# Patient Record
Sex: FEMALE | Race: WHITE | NOT HISPANIC OR LATINO | Employment: OTHER | URBAN - METROPOLITAN AREA
[De-identification: names, ages, dates, MRNs, and addresses within clinical notes are randomized per-mention and may not be internally consistent; named-entity substitution may affect disease eponyms.]

---

## 2017-01-17 ENCOUNTER — TELEPHONE (OUTPATIENT)
Dept: SMOKING CESSATION | Facility: CLINIC | Age: 52
End: 2017-01-17

## 2017-01-18 ENCOUNTER — TELEPHONE (OUTPATIENT)
Dept: SMOKING CESSATION | Facility: CLINIC | Age: 52
End: 2017-01-18

## 2017-08-02 ENCOUNTER — TELEPHONE (OUTPATIENT)
Dept: SMOKING CESSATION | Facility: CLINIC | Age: 52
End: 2017-08-02

## 2017-08-08 ENCOUNTER — TELEPHONE (OUTPATIENT)
Dept: SMOKING CESSATION | Facility: CLINIC | Age: 52
End: 2017-08-08

## 2017-08-11 ENCOUNTER — TELEPHONE (OUTPATIENT)
Dept: SMOKING CESSATION | Facility: CLINIC | Age: 52
End: 2017-08-11

## 2022-07-10 ENCOUNTER — HOSPITAL ENCOUNTER (EMERGENCY)
Facility: HOSPITAL | Age: 57
Discharge: HOME | End: 2022-07-10
Attending: EMERGENCY MEDICINE
Payer: COMMERCIAL

## 2022-07-10 VITALS
BODY MASS INDEX: 31.28 KG/M2 | HEIGHT: 62 IN | RESPIRATION RATE: 14 BRPM | SYSTOLIC BLOOD PRESSURE: 151 MMHG | HEART RATE: 78 BPM | OXYGEN SATURATION: 95 % | DIASTOLIC BLOOD PRESSURE: 84 MMHG | WEIGHT: 170 LBS

## 2022-07-10 DIAGNOSIS — R56.9 SEIZURE-LIKE ACTIVITY: ICD-10-CM

## 2022-07-10 DIAGNOSIS — R55 SYNCOPE: ICD-10-CM

## 2022-07-10 DIAGNOSIS — E87.5 HYPERKALEMIA: Primary | ICD-10-CM

## 2022-07-10 LAB
ALBUMIN SERPL BCP-MCNC: 3.9 G/DL (ref 3.5–5.2)
ALLENS TEST: ABNORMAL
ALP SERPL-CCNC: 90 U/L (ref 55–135)
ALT SERPL W/O P-5'-P-CCNC: 19 U/L (ref 10–44)
ANION GAP SERPL CALC-SCNC: 11 MMOL/L (ref 8–16)
ANION GAP SERPL CALC-SCNC: 12 MMOL/L (ref 8–16)
AST SERPL-CCNC: 34 U/L (ref 10–40)
BACTERIA #/AREA URNS HPF: ABNORMAL /HPF
BASOPHILS # BLD AUTO: 0.07 K/UL (ref 0–0.2)
BASOPHILS NFR BLD: 0.5 % (ref 0–1.9)
BILIRUB SERPL-MCNC: 0.4 MG/DL (ref 0.1–1)
BILIRUB UR QL STRIP: NEGATIVE
BNP SERPL-MCNC: <10 PG/ML (ref 0–99)
BUN SERPL-MCNC: 9 MG/DL (ref 6–20)
BUN SERPL-MCNC: 9 MG/DL (ref 6–20)
CALCIUM SERPL-MCNC: 9.1 MG/DL (ref 8.7–10.5)
CALCIUM SERPL-MCNC: 9.4 MG/DL (ref 8.7–10.5)
CHLORIDE SERPL-SCNC: 104 MMOL/L (ref 95–110)
CHLORIDE SERPL-SCNC: 108 MMOL/L (ref 95–110)
CK SERPL-CCNC: 64 U/L (ref 20–180)
CLARITY UR: CLEAR
CO2 SERPL-SCNC: 21 MMOL/L (ref 23–29)
CO2 SERPL-SCNC: 21 MMOL/L (ref 23–29)
COLOR UR: YELLOW
CREAT SERPL-MCNC: 0.8 MG/DL (ref 0.5–1.4)
CREAT SERPL-MCNC: 1 MG/DL (ref 0.5–1.4)
DELSYS: ABNORMAL
DIFFERENTIAL METHOD: ABNORMAL
EOSINOPHIL # BLD AUTO: 0 K/UL (ref 0–0.5)
EOSINOPHIL NFR BLD: 0.2 % (ref 0–8)
ERYTHROCYTE [DISTWIDTH] IN BLOOD BY AUTOMATED COUNT: 13.3 % (ref 11.5–14.5)
EST. GFR  (AFRICAN AMERICAN): >60 ML/MIN/1.73 M^2
EST. GFR  (AFRICAN AMERICAN): >60 ML/MIN/1.73 M^2
EST. GFR  (NON AFRICAN AMERICAN): >60 ML/MIN/1.73 M^2
EST. GFR  (NON AFRICAN AMERICAN): >60 ML/MIN/1.73 M^2
GLUCOSE SERPL-MCNC: 110 MG/DL (ref 70–110)
GLUCOSE SERPL-MCNC: 123 MG/DL (ref 70–110)
GLUCOSE UR QL STRIP: NEGATIVE
HCO3 UR-SCNC: 28.3 MMOL/L (ref 24–28)
HCT VFR BLD AUTO: 47.7 % (ref 37–48.5)
HGB BLD-MCNC: 15.6 G/DL (ref 12–16)
HGB UR QL STRIP: ABNORMAL
IMM GRANULOCYTES # BLD AUTO: 0.06 K/UL (ref 0–0.04)
IMM GRANULOCYTES NFR BLD AUTO: 0.5 % (ref 0–0.5)
KETONES UR QL STRIP: NEGATIVE
LEUKOCYTE ESTERASE UR QL STRIP: NEGATIVE
LYMPHOCYTES # BLD AUTO: 2.1 K/UL (ref 1–4.8)
LYMPHOCYTES NFR BLD: 16.6 % (ref 18–48)
MAGNESIUM SERPL-MCNC: 2.3 MG/DL (ref 1.6–2.6)
MCH RBC QN AUTO: 29.4 PG (ref 27–31)
MCHC RBC AUTO-ENTMCNC: 32.7 G/DL (ref 32–36)
MCV RBC AUTO: 90 FL (ref 82–98)
MICROSCOPIC COMMENT: ABNORMAL
MONOCYTES # BLD AUTO: 0.7 K/UL (ref 0.3–1)
MONOCYTES NFR BLD: 5.8 % (ref 4–15)
NEUTROPHILS # BLD AUTO: 9.7 K/UL (ref 1.8–7.7)
NEUTROPHILS NFR BLD: 76.4 % (ref 38–73)
NITRITE UR QL STRIP: NEGATIVE
NRBC BLD-RTO: 0 /100 WBC
PCO2 BLDA: 50.1 MMHG (ref 35–45)
PH SMN: 7.36 [PH] (ref 7.35–7.45)
PH UR STRIP: 6 [PH] (ref 5–8)
PLATELET # BLD AUTO: 264 K/UL (ref 150–450)
PMV BLD AUTO: 10.5 FL (ref 9.2–12.9)
PO2 BLDA: 61 MMHG (ref 40–60)
POC BE: 3 MMOL/L
POC SATURATED O2: 90 % (ref 95–100)
POTASSIUM SERPL-SCNC: 4.7 MMOL/L (ref 3.5–5.1)
POTASSIUM SERPL-SCNC: 5.9 MMOL/L (ref 3.5–5.1)
PROT SERPL-MCNC: 8.4 G/DL (ref 6–8.4)
PROT UR QL STRIP: ABNORMAL
RBC # BLD AUTO: 5.3 M/UL (ref 4–5.4)
RBC #/AREA URNS HPF: 5 /HPF (ref 0–4)
SAMPLE: ABNORMAL
SITE: ABNORMAL
SODIUM SERPL-SCNC: 137 MMOL/L (ref 136–145)
SODIUM SERPL-SCNC: 140 MMOL/L (ref 136–145)
SP GR UR STRIP: 1.02 (ref 1–1.03)
SQUAMOUS #/AREA URNS HPF: 2 /HPF
TROPONIN I SERPL DL<=0.01 NG/ML-MCNC: <0.006 NG/ML (ref 0–0.03)
URN SPEC COLLECT METH UR: ABNORMAL
UROBILINOGEN UR STRIP-ACNC: ABNORMAL EU/DL
WBC # BLD AUTO: 12.73 K/UL (ref 3.9–12.7)

## 2022-07-10 PROCEDURE — 81000 URINALYSIS NONAUTO W/SCOPE: CPT | Performed by: EMERGENCY MEDICINE

## 2022-07-10 PROCEDURE — 84484 ASSAY OF TROPONIN QUANT: CPT | Performed by: EMERGENCY MEDICINE

## 2022-07-10 PROCEDURE — 85025 COMPLETE CBC W/AUTO DIFF WBC: CPT | Performed by: EMERGENCY MEDICINE

## 2022-07-10 PROCEDURE — 93005 ELECTROCARDIOGRAM TRACING: CPT

## 2022-07-10 PROCEDURE — 93010 EKG 12-LEAD: ICD-10-PCS | Mod: ,,, | Performed by: INTERNAL MEDICINE

## 2022-07-10 PROCEDURE — 82550 ASSAY OF CK (CPK): CPT | Performed by: EMERGENCY MEDICINE

## 2022-07-10 PROCEDURE — 83735 ASSAY OF MAGNESIUM: CPT | Performed by: EMERGENCY MEDICINE

## 2022-07-10 PROCEDURE — 63600175 PHARM REV CODE 636 W HCPCS: Performed by: EMERGENCY MEDICINE

## 2022-07-10 PROCEDURE — 93010 ELECTROCARDIOGRAM REPORT: CPT | Mod: ,,, | Performed by: INTERNAL MEDICINE

## 2022-07-10 PROCEDURE — 83880 ASSAY OF NATRIURETIC PEPTIDE: CPT | Performed by: EMERGENCY MEDICINE

## 2022-07-10 PROCEDURE — 80053 COMPREHEN METABOLIC PANEL: CPT | Performed by: EMERGENCY MEDICINE

## 2022-07-10 PROCEDURE — 25000003 PHARM REV CODE 250: Performed by: EMERGENCY MEDICINE

## 2022-07-10 PROCEDURE — 80048 BASIC METABOLIC PNL TOTAL CA: CPT | Mod: XB | Performed by: EMERGENCY MEDICINE

## 2022-07-10 PROCEDURE — 99285 EMERGENCY DEPT VISIT HI MDM: CPT | Mod: 25

## 2022-07-10 PROCEDURE — 96365 THER/PROPH/DIAG IV INF INIT: CPT

## 2022-07-10 RX ORDER — SODIUM CHLORIDE 9 MG/ML
1000 INJECTION, SOLUTION INTRAVENOUS
Status: COMPLETED | OUTPATIENT
Start: 2022-07-10 | End: 2022-07-10

## 2022-07-10 RX ORDER — CALCIUM GLUCONATE 20 MG/ML
1 INJECTION, SOLUTION INTRAVENOUS
Status: COMPLETED | OUTPATIENT
Start: 2022-07-10 | End: 2022-07-10

## 2022-07-10 RX ADMIN — CALCIUM GLUCONATE 1 G: 20 INJECTION, SOLUTION INTRAVENOUS at 07:07

## 2022-07-10 RX ADMIN — SODIUM CHLORIDE 1000 ML: 0.9 INJECTION, SOLUTION INTRAVENOUS at 08:07

## 2022-07-10 RX ADMIN — SODIUM CHLORIDE, SODIUM LACTATE, POTASSIUM CHLORIDE, AND CALCIUM CHLORIDE 1000 ML: .6; .31; .03; .02 INJECTION, SOLUTION INTRAVENOUS at 07:07

## 2022-07-10 NOTE — ED PROVIDER NOTES
SCRIBE #1 NOTE: I, Carlos Shields, am scribing for, and in the presence of, Jacinto Martin MD. I have scribed the entire note.       History     Chief Complaint   Patient presents with    Seizures     Pt brought in ambulance for weakness and seizures; pt did not hit head     Review of patient's allergies indicates:   Allergen Reactions    Penicillins Shortness Of Breath and Nausea And Vomiting         History of Present Illness     HPI    7/10/2022, 6:54 PM  History obtained from the patient      History of Present Illness: Vesna Richards is a 56 y.o. female patient  who presents to the Emergency Department for evaluation of loss of conciousness which occurred just PTA. Pt was overheated and passed out. Pt reportedly did not hit her head. As per pt's family, pt was shaking while she was unconscious. Patient's  says this lasted for several minutes before she regained consciousness. Pt reportedly urinated on herself during her syncopic episode. After, she says she was mildly confused for several minutes but is now feeling normal. Symptoms are constant and moderate in severity. No mitigating or exacerbating factors reported. Associated sxs include fatigue. Patient denies any fever, chills, SOB, cough, congestion, weakness, numbness, N/V/D, and all other sxs at this time. No prior Tx reported. No further complaints or concerns at this time.       Arrival mode: EMS      PCP: Primary Doctor No        Past Medical History:  No past medical history on file.    Past Surgical History:  Past Surgical History:   Procedure Laterality Date     SECTION           Family History:  No family history on file.    Social History:  Social History     Tobacco Use    Smoking status: Current Every Day Smoker     Packs/day: 3.00     Years: 37.00     Pack years: 111.00     Types: Cigarettes    Smokeless tobacco: Not on file   Substance and Sexual Activity    Alcohol use: Not on file    Drug use: Not on file    Sexual  activity: Not on file        Review of Systems     Review of Systems   Constitutional: Positive for fatigue. Negative for chills and fever.   HENT: Negative for congestion and sore throat.    Respiratory: Negative for cough and shortness of breath.    Cardiovascular: Negative for chest pain.   Gastrointestinal: Negative for diarrhea, nausea and vomiting.   Genitourinary: Negative for dysuria.   Musculoskeletal: Negative for back pain.   Skin: Negative for rash.   Neurological: Positive for seizures. Negative for weakness and numbness.   Hematological: Does not bruise/bleed easily.   All other systems reviewed and are negative.     Physical Exam     Initial Vitals [07/10/22 1744]   BP Pulse Resp Temp SpO2   (!) 144/84 95 14 -- 96 %      MAP       --          Physical Exam  Nursing Notes and Vital Signs Reviewed.  Constitutional: Patient is in no acute distress. Well-developed and well-nourished.  Head: Atraumatic. Normocephalic.  Eyes: PERRL. EOM intact. Conjunctivae are not pale. No scleral icterus.  ENT: Mucous membranes are moist. Oropharynx is clear and symmetric.    Neck: Supple. Full ROM. No lymphadenopathy.  Cardiovascular: Regular rate. Regular rhythm. No murmurs, rubs, or gallops. Distal pulses are 2+ and symmetric.  Pulmonary/Chest: No respiratory distress. Clear to auscultation bilaterally. No wheezing or rales.  Abdominal: Soft and non-distended.  There is no tenderness.  No rebound, guarding, or rigidity.   Musculoskeletal: Moves all extremities. No obvious deformities. No edema. No calf tenderness.  Skin: Warm and dry.  Neurological:  Alert, awake, and appropriate.  Normal speech.  No acute focal neurological deficits are appreciated.  Psychiatric: Normal affect. Good eye contact. Appropriate in content.     ED Course   Procedures  ED Vital Signs:  Vitals:    07/10/22 1744 07/10/22 2030 07/10/22 2204   BP: (!) 144/84 (!) 151/84    Pulse: 95 78    Resp: 14 14    SpO2: 96% 95%    Weight:   77.1 kg (169 lb  "15.6 oz)   Height:   5' 2" (1.575 m)       Abnormal Lab Results:  Labs Reviewed   CBC W/ AUTO DIFFERENTIAL - Abnormal; Notable for the following components:       Result Value    WBC 12.73 (*)     Gran # (ANC) 9.7 (*)     Immature Grans (Abs) 0.06 (*)     Gran % 76.4 (*)     Lymph % 16.6 (*)     All other components within normal limits   COMPREHENSIVE METABOLIC PANEL - Abnormal; Notable for the following components:    Potassium 5.9 (*)     CO2 21 (*)     Glucose 123 (*)     All other components within normal limits   URINALYSIS, REFLEX TO URINE CULTURE - Abnormal; Notable for the following components:    Protein, UA Trace (*)     Occult Blood UA 1+ (*)     Urobilinogen, UA 2.0-3.0 (*)     All other components within normal limits    Narrative:     Specimen Source->Urine   URINALYSIS MICROSCOPIC - Abnormal; Notable for the following components:    RBC, UA 5 (*)     All other components within normal limits    Narrative:     Specimen Source->Urine   BASIC METABOLIC PANEL - Abnormal; Notable for the following components:    CO2 21 (*)     All other components within normal limits   ISTAT PROCEDURE - Abnormal; Notable for the following components:    POC PCO2 50.1 (*)     POC PO2 61 (*)     POC HCO3 28.3 (*)     POC SATURATED O2 90 (*)     All other components within normal limits   MAGNESIUM   CK   B-TYPE NATRIURETIC PEPTIDE   TROPONIN I        All Lab Results:  Results for orders placed or performed during the hospital encounter of 07/10/22   CBC auto differential   Result Value Ref Range    WBC 12.73 (H) 3.90 - 12.70 K/uL    RBC 5.30 4.00 - 5.40 M/uL    Hemoglobin 15.6 12.0 - 16.0 g/dL    Hematocrit 47.7 37.0 - 48.5 %    MCV 90 82 - 98 fL    MCH 29.4 27.0 - 31.0 pg    MCHC 32.7 32.0 - 36.0 g/dL    RDW 13.3 11.5 - 14.5 %    Platelets 264 150 - 450 K/uL    MPV 10.5 9.2 - 12.9 fL    Immature Granulocytes 0.5 0.0 - 0.5 %    Gran # (ANC) 9.7 (H) 1.8 - 7.7 K/uL    Immature Grans (Abs) 0.06 (H) 0.00 - 0.04 K/uL    Lymph # " 2.1 1.0 - 4.8 K/uL    Mono # 0.7 0.3 - 1.0 K/uL    Eos # 0.0 0.0 - 0.5 K/uL    Baso # 0.07 0.00 - 0.20 K/uL    nRBC 0 0 /100 WBC    Gran % 76.4 (H) 38.0 - 73.0 %    Lymph % 16.6 (L) 18.0 - 48.0 %    Mono % 5.8 4.0 - 15.0 %    Eosinophil % 0.2 0.0 - 8.0 %    Basophil % 0.5 0.0 - 1.9 %    Differential Method Automated    Comprehensive metabolic panel   Result Value Ref Range    Sodium 137 136 - 145 mmol/L    Potassium 5.9 (H) 3.5 - 5.1 mmol/L    Chloride 104 95 - 110 mmol/L    CO2 21 (L) 23 - 29 mmol/L    Glucose 123 (H) 70 - 110 mg/dL    BUN 9 6 - 20 mg/dL    Creatinine 1.0 0.5 - 1.4 mg/dL    Calcium 9.4 8.7 - 10.5 mg/dL    Total Protein 8.4 6.0 - 8.4 g/dL    Albumin 3.9 3.5 - 5.2 g/dL    Total Bilirubin 0.4 0.1 - 1.0 mg/dL    Alkaline Phosphatase 90 55 - 135 U/L    AST 34 10 - 40 U/L    ALT 19 10 - 44 U/L    Anion Gap 12 8 - 16 mmol/L    eGFR if African American >60 >60 mL/min/1.73 m^2    eGFR if non African American >60 >60 mL/min/1.73 m^2   Magnesium   Result Value Ref Range    Magnesium 2.3 1.6 - 2.6 mg/dL   CK   Result Value Ref Range    CPK 64 20 - 180 U/L   Brain natriuretic peptide   Result Value Ref Range    BNP <10 0 - 99 pg/mL   Troponin I   Result Value Ref Range    Troponin I <0.006 0.000 - 0.026 ng/mL   Urinalysis, Reflex to Urine Culture Urine, Clean Catch    Specimen: Urine   Result Value Ref Range    Specimen UA Urine, Clean Catch     Color, UA Yellow Yellow, Straw, Lo    Appearance, UA Clear Clear    pH, UA 6.0 5.0 - 8.0    Specific Gravity, UA 1.020 1.005 - 1.030    Protein, UA Trace (A) Negative    Glucose, UA Negative Negative    Ketones, UA Negative Negative    Bilirubin (UA) Negative Negative    Occult Blood UA 1+ (A) Negative    Nitrite, UA Negative Negative    Urobilinogen, UA 2.0-3.0 (A) <2.0 EU/dL    Leukocytes, UA Negative Negative   Urinalysis Microscopic   Result Value Ref Range    RBC, UA 5 (H) 0 - 4 /hpf    Bacteria Occasional None-Occ /hpf    Squam Epithel, UA 2 /hpf    Microscopic  Comment SEE COMMENT    Basic metabolic panel   Result Value Ref Range    Sodium 140 136 - 145 mmol/L    Potassium 4.7 3.5 - 5.1 mmol/L    Chloride 108 95 - 110 mmol/L    CO2 21 (L) 23 - 29 mmol/L    Glucose 110 70 - 110 mg/dL    BUN 9 6 - 20 mg/dL    Creatinine 0.8 0.5 - 1.4 mg/dL    Calcium 9.1 8.7 - 10.5 mg/dL    Anion Gap 11 8 - 16 mmol/L    eGFR if African American >60 >60 mL/min/1.73 m^2    eGFR if non African American >60 >60 mL/min/1.73 m^2   ISTAT PROCEDURE   Result Value Ref Range    POC PH 7.360 7.35 - 7.45    POC PCO2 50.1 (H) 35 - 45 mmHg    POC PO2 61 (HH) 40 - 60 mmHg    POC HCO3 28.3 (H) 24 - 28 mmol/L    POC BE 3 -2 to 2 mmol/L    POC SATURATED O2 90 (L) 95 - 100 %    Sample VENOUS     Site Other     Allens Test N/A     DelSys Room Air        Imaging Results:  Imaging Results          CT Head Without Contrast (Final result)  Result time 07/10/22 19:17:31    Final result by Valarie Darnell MD (07/10/22 19:17:31)                 Impression:      No acute abnormality.    All CT scans   are performed using dose optimization techniques including the following: automated exposure control; adjustment of the mA and/or kV; use of iterative reconstruction technique.  Dose modulation was employed for ALARA by means of: Automated exposure control; adjustment of the mA and/or kV according to patient size (this includes techniques or standardized protocols for targeted exams where dose is matched to indication/reason for exam; i.e. extremities or head); and/or use of iterative reconstructive technique.      Electronically signed by: Mann Sheppard  Date:    07/10/2022  Time:    19:17             Narrative:    EXAMINATION:  CT HEAD WITHOUT CONTRAST    CLINICAL HISTORY:  Head trauma, abnormal mental status (Age 19-64y);    TECHNIQUE:  Low dose axial CT images obtained throughout the head without intravenous contrast. Sagittal and coronal reconstructions were performed.    COMPARISON:  None.    FINDINGS:  Intracranial  compartment:    Ventricles and sulci are normal in size for age without evidence of hydrocephalus. No extra-axial blood or fluid collections.    No parenchymal mass, hemorrhage, edema or major vascular distribution infarct.    Skull/extracranial contents (limited evaluation): No fracture. Mastoid air cells and paranasal sinuses are essentially clear.                               X-Ray Chest AP Portable (Final result)  Result time 07/10/22 19:07:08    Final result by Valarie Darnell MD (07/10/22 19:07:08)                 Impression:      Mild perihilar interstitial prominence.  Correlate clinically for pulmonary edema and early CHF.      Electronically signed by: Mann Sheppard  Date:    07/10/2022  Time:    19:07             Narrative:    EXAMINATION:  XR CHEST AP PORTABLE    CLINICAL HISTORY:  SOB;    TECHNIQUE:  Single frontal view of the chest was performed.    COMPARISON:  None    FINDINGS:  Mild perihilar interstitial opacities.The lungs are otherwise clear, with normal appearance of pulmonary vasculature and no pleural effusion or pneumothorax.    The cardiac silhouette is prominent.  The hilar and mediastinal contours are unremarkable.    Bones are intact.                                 The EKG was ordered, reviewed, and independently interpreted by the ED provider.  Interpretation time: 19:25  Rate: 79 BPM  Rhythm: normal sinus rhythm  Interpretation: Septal infarct, age undetermined. ST & T wave abnormality, consider lateral ischemia. No STEMI.           The Emergency Provider reviewed the vital signs and test results, which are outlined above.     ED Discussion     9:09 PM: Discussed pt's case with Dr. Crowder (Neurology) who recommends making sure pt is back to her baseline and able to ambulate well before discharge. He also recommends MRI brain w/wo, seizure protocol, and EEG within next 7 days with Neurology appt within next 7 days.    10:36 PM: Reassessed pt at this time. Discussed with pt all  pertinent ED information and results. Discussed pt dx and plan of tx, including seizure precautions. Gave pt all f/u and return to the ED instructions. All questions and concerns were addressed at this time. Pt expresses understanding of information and instructions, and is comfortable with plan to discharge. Pt is stable for discharge.    I discussed with patient and/or family/caretaker that evaluation in the ED does not suggest any emergent or life threatening medical conditions requiring immediate intervention beyond what was provided in the ED, and I believe patient is safe for discharge.  Regardless, an unremarkable evaluation in the ED does not preclude the development or presence of a serious of life threatening condition. As such, patient was instructed to return immediately for any worsening or change in current symptoms.     Medical Decision Making:   Clinical Tests:   Lab Tests: Ordered and Reviewed  Radiological Study: Ordered and Reviewed  Medical Tests: Ordered and Reviewed           ED Medication(s):  Medications   lactated ringers bolus 1,000 mL (0 mLs Intravenous Stopped 7/10/22 2031)   calcium gluconate 1 g in NS IVPB (premixed) (0 g Intravenous Stopped 7/10/22 2031)   0.9%  NaCl infusion (0 mLs Intravenous Stopped 7/10/22 2104)       There are no discharge medications for this patient.       Follow-up Information     Schedule an appointment as soon as possible for a visit  with [facility].    Specialty: Neurology  Why: You need a MRI and EEG within 1 week.  Contact information:  [address] [facility] [address] [phone]  Additional information:  Please park on the [address] side and use the Clinic entrance. Check in on the 1st floor, to the right across from the café.                           Scribe Attestation:   Scribe #1: I performed the above scribed service and the documentation accurately describes the services I performed. I attest to the  accuracy of the note.     Attending:   Physician Attestation Statement for Scribe #1: I, Jacinto Martin MD, personally performed the services described in this documentation, as scribed by Carlos Shields, in my presence, and it is both accurate and complete.           Clinical Impression       ICD-10-CM ICD-9-CM   1. Hyperkalemia  E87.5 276.7   2. Syncope  R55 780.2   3. Seizure-like activity  R56.9 780.39       Disposition:   Disposition: Discharged  Condition: Stable       Jacinto Martin MD  07/11/22 0242

## 2022-07-10 NOTE — Clinical Note
"Vesna"Keven Richards was seen and treated in our emergency department on 7/10/2022.  She may return with limitations on 07/11/2022.  Patient cannot drive or operate heavy machinery until cleared by neurology.      Sincerely,      Jacinto Martin MD    "

## 2022-07-14 ENCOUNTER — TELEPHONE (OUTPATIENT)
Dept: NEUROLOGY | Facility: CLINIC | Age: 57
End: 2022-07-14
Payer: COMMERCIAL

## 2022-07-14 NOTE — TELEPHONE ENCOUNTER
I spoke with patient in regards to scheduling appointment and I advise her that the next available with Dr. Kelly is out in January for the new year. Stated he is the only neurologist in this region and we could add her to the scheduled and then place her on the waiting list. In case we get any cancellations. She did verbalized understating and stated she would reach out to her PCP and would get back to us. Also advise that we could try the [facility] to see if they have a sooner opening.

## 2022-07-14 NOTE — TELEPHONE ENCOUNTER
----- Message from Eve Cleary sent at 7/14/2022 10:28 AM CDT -----  Type:  Sooner Apoointment Request    Caller is requesting a sooner appointment.  Caller declined first available appointment listed below.  Caller will not accept being placed on the waitlist and is requesting a message be sent to doctor.  Name of Caller:patient  When is the first available appointment?na  Symptoms:Np, ER follow up within 1wk, with a MRI/EEG  Would the patient rather a call back or a response via [facility]?call back  Best Call Back [phone]  Additional Information: na

## 2023-04-24 ENCOUNTER — OFFICE VISIT (OUTPATIENT)
Dept: FAMILY MEDICINE | Facility: CLINIC | Age: 58
End: 2023-04-24
Payer: COMMERCIAL

## 2023-04-24 VITALS
DIASTOLIC BLOOD PRESSURE: 68 MMHG | WEIGHT: 176 LBS | HEART RATE: 100 BPM | OXYGEN SATURATION: 96 % | BODY MASS INDEX: 32.39 KG/M2 | HEIGHT: 62 IN | SYSTOLIC BLOOD PRESSURE: 114 MMHG

## 2023-04-24 DIAGNOSIS — Z00.00 ANNUAL PHYSICAL EXAM: ICD-10-CM

## 2023-04-24 DIAGNOSIS — R73.09 ELEVATED GLUCOSE: ICD-10-CM

## 2023-04-24 DIAGNOSIS — Z11.59 NEED FOR HEPATITIS C SCREENING TEST: Primary | ICD-10-CM

## 2023-04-24 DIAGNOSIS — Z12.11 COLON CANCER SCREENING: ICD-10-CM

## 2023-04-24 DIAGNOSIS — E78.2 MIXED HYPERLIPIDEMIA: ICD-10-CM

## 2023-04-24 DIAGNOSIS — Z72.0 TOBACCO USE: ICD-10-CM

## 2023-04-24 DIAGNOSIS — F41.9 ANXIETY: ICD-10-CM

## 2023-04-24 DIAGNOSIS — Z11.4 SCREENING FOR HIV (HUMAN IMMUNODEFICIENCY VIRUS): ICD-10-CM

## 2023-04-24 DIAGNOSIS — Z12.31 ENCOUNTER FOR SCREENING MAMMOGRAM FOR MALIGNANT NEOPLASM OF BREAST: ICD-10-CM

## 2023-04-24 PROBLEM — E78.5 HYPERLIPIDEMIA: Status: ACTIVE | Noted: 2023-04-24

## 2023-04-24 PROBLEM — J30.9 ALLERGIC RHINITIS: Status: ACTIVE | Noted: 2023-04-24

## 2023-04-24 PROBLEM — R55 SYNCOPE: Status: ACTIVE | Noted: 2017-10-17

## 2023-04-24 PROCEDURE — 3078F PR MOST RECENT DIASTOLIC BLOOD PRESSURE < 80 MM HG: ICD-10-PCS | Mod: CPTII,S$GLB,, | Performed by: STUDENT IN AN ORGANIZED HEALTH CARE EDUCATION/TRAINING PROGRAM

## 2023-04-24 PROCEDURE — 99999 PR PBB SHADOW E&M-EST. PATIENT-LVL V: CPT | Mod: PBBFAC,,, | Performed by: STUDENT IN AN ORGANIZED HEALTH CARE EDUCATION/TRAINING PROGRAM

## 2023-04-24 PROCEDURE — 99406 PR TOBACCO USE CESSATION INTERMEDIATE 3-10 MINUTES: ICD-10-PCS | Mod: S$GLB,,, | Performed by: STUDENT IN AN ORGANIZED HEALTH CARE EDUCATION/TRAINING PROGRAM

## 2023-04-24 PROCEDURE — 3078F DIAST BP <80 MM HG: CPT | Mod: CPTII,S$GLB,, | Performed by: STUDENT IN AN ORGANIZED HEALTH CARE EDUCATION/TRAINING PROGRAM

## 2023-04-24 PROCEDURE — 3074F PR MOST RECENT SYSTOLIC BLOOD PRESSURE < 130 MM HG: ICD-10-PCS | Mod: CPTII,S$GLB,, | Performed by: STUDENT IN AN ORGANIZED HEALTH CARE EDUCATION/TRAINING PROGRAM

## 2023-04-24 PROCEDURE — 99386 PR PREVENTIVE VISIT,NEW,40-64: ICD-10-PCS | Mod: 25,S$GLB,, | Performed by: STUDENT IN AN ORGANIZED HEALTH CARE EDUCATION/TRAINING PROGRAM

## 2023-04-24 PROCEDURE — 99999 PR PBB SHADOW E&M-EST. PATIENT-LVL V: ICD-10-PCS | Mod: PBBFAC,,, | Performed by: STUDENT IN AN ORGANIZED HEALTH CARE EDUCATION/TRAINING PROGRAM

## 2023-04-24 PROCEDURE — 3008F PR BODY MASS INDEX (BMI) DOCUMENTED: ICD-10-PCS | Mod: CPTII,S$GLB,, | Performed by: STUDENT IN AN ORGANIZED HEALTH CARE EDUCATION/TRAINING PROGRAM

## 2023-04-24 PROCEDURE — 1159F PR MEDICATION LIST DOCUMENTED IN MEDICAL RECORD: ICD-10-PCS | Mod: CPTII,S$GLB,, | Performed by: STUDENT IN AN ORGANIZED HEALTH CARE EDUCATION/TRAINING PROGRAM

## 2023-04-24 PROCEDURE — 1160F RVW MEDS BY RX/DR IN RCRD: CPT | Mod: CPTII,S$GLB,, | Performed by: STUDENT IN AN ORGANIZED HEALTH CARE EDUCATION/TRAINING PROGRAM

## 2023-04-24 PROCEDURE — 99386 PREV VISIT NEW AGE 40-64: CPT | Mod: 25,S$GLB,, | Performed by: STUDENT IN AN ORGANIZED HEALTH CARE EDUCATION/TRAINING PROGRAM

## 2023-04-24 PROCEDURE — 1159F MED LIST DOCD IN RCRD: CPT | Mod: CPTII,S$GLB,, | Performed by: STUDENT IN AN ORGANIZED HEALTH CARE EDUCATION/TRAINING PROGRAM

## 2023-04-24 PROCEDURE — 1160F PR REVIEW ALL MEDS BY PRESCRIBER/CLIN PHARMACIST DOCUMENTED: ICD-10-PCS | Mod: CPTII,S$GLB,, | Performed by: STUDENT IN AN ORGANIZED HEALTH CARE EDUCATION/TRAINING PROGRAM

## 2023-04-24 PROCEDURE — 99406 BEHAV CHNG SMOKING 3-10 MIN: CPT | Mod: S$GLB,,, | Performed by: STUDENT IN AN ORGANIZED HEALTH CARE EDUCATION/TRAINING PROGRAM

## 2023-04-24 PROCEDURE — 3008F BODY MASS INDEX DOCD: CPT | Mod: CPTII,S$GLB,, | Performed by: STUDENT IN AN ORGANIZED HEALTH CARE EDUCATION/TRAINING PROGRAM

## 2023-04-24 PROCEDURE — 3074F SYST BP LT 130 MM HG: CPT | Mod: CPTII,S$GLB,, | Performed by: STUDENT IN AN ORGANIZED HEALTH CARE EDUCATION/TRAINING PROGRAM

## 2023-04-24 RX ORDER — ATORVASTATIN CALCIUM 40 MG/1
40 TABLET, FILM COATED ORAL
COMMUNITY
Start: 2023-02-20 | End: 2023-04-24 | Stop reason: SDUPTHER

## 2023-04-24 RX ORDER — METHOCARBAMOL 750 MG/1
750 TABLET, FILM COATED ORAL
COMMUNITY
Start: 2023-01-27 | End: 2023-04-24

## 2023-04-24 RX ORDER — HYDROXYZINE PAMOATE 25 MG/1
25 CAPSULE ORAL 2 TIMES DAILY
Qty: 180 CAPSULE | Refills: 3 | Status: SHIPPED | OUTPATIENT
Start: 2023-04-24 | End: 2023-10-05 | Stop reason: SDUPTHER

## 2023-04-24 RX ORDER — MELOXICAM 15 MG/1
15 TABLET ORAL
COMMUNITY
Start: 2023-02-23 | End: 2023-04-24

## 2023-04-24 RX ORDER — PAROXETINE 30 MG/1
30 TABLET, FILM COATED ORAL 2 TIMES DAILY
Qty: 180 TABLET | Refills: 3 | Status: ON HOLD | OUTPATIENT
Start: 2023-04-24 | End: 2023-06-04 | Stop reason: SDUPTHER

## 2023-04-24 RX ORDER — HYDROXYZINE PAMOATE 25 MG/1
25 CAPSULE ORAL
COMMUNITY
Start: 2023-03-10 | End: 2023-04-24 | Stop reason: SDUPTHER

## 2023-04-24 RX ORDER — BUSPIRONE HYDROCHLORIDE 5 MG/1
5 TABLET ORAL 2 TIMES DAILY
Qty: 180 TABLET | Refills: 3 | Status: SHIPPED | OUTPATIENT
Start: 2023-04-24 | End: 2023-07-19 | Stop reason: ALTCHOICE

## 2023-04-24 RX ORDER — HYDROCODONE BITARTRATE AND ACETAMINOPHEN 5; 325 MG/1; MG/1
1 TABLET ORAL EVERY 4 HOURS PRN
COMMUNITY
Start: 2023-02-23 | End: 2023-04-24

## 2023-04-24 RX ORDER — VARENICLINE TARTRATE 1 MG/1
TABLET, FILM COATED ORAL
COMMUNITY
End: 2023-04-24

## 2023-04-24 RX ORDER — ALBUTEROL SULFATE 90 UG/1
AEROSOL, METERED RESPIRATORY (INHALATION)
COMMUNITY
End: 2023-04-24

## 2023-04-24 RX ORDER — DOXYLAMINE SUCCINATE AND PHENYLEPHRINE HYDROCHLORIDE 7.5; 1 MG/1; MG/1
TABLET ORAL
COMMUNITY
End: 2023-04-24

## 2023-04-24 RX ORDER — BUSPIRONE HYDROCHLORIDE 5 MG/1
5 TABLET ORAL 2 TIMES DAILY
COMMUNITY
Start: 2023-04-17 | End: 2023-04-24 | Stop reason: SDUPTHER

## 2023-04-24 RX ORDER — CHLORZOXAZONE 500 MG/1
500 TABLET ORAL
COMMUNITY
Start: 2023-02-23 | End: 2023-04-24

## 2023-04-24 RX ORDER — ATORVASTATIN CALCIUM 40 MG/1
40 TABLET, FILM COATED ORAL DAILY
Qty: 90 TABLET | Refills: 3 | Status: SHIPPED | OUTPATIENT
Start: 2023-04-24

## 2023-04-24 RX ORDER — PAROXETINE 10 MG/1
TABLET, FILM COATED ORAL
COMMUNITY
End: 2023-04-24 | Stop reason: SDUPTHER

## 2023-04-24 RX ORDER — GABAPENTIN 300 MG/1
300 CAPSULE ORAL NIGHTLY
COMMUNITY
Start: 2023-02-23 | End: 2023-04-24

## 2023-04-24 NOTE — PATIENT INSTRUCTIONS
[email or web address]  Typically excepts insurance       [facility]  Sees all ages   [address] [place]  [address]  Phone:  [phone]  Fax:  [phone]      [facility]  Dr. Katelyn Irby  [address]  [place], [place] [phone]      [facility]  Lien Cruz MA, LPC  [place], [place]  [phone]  [email or web address]       [facility]  [place]: [phone]  [place]: [phone]  [place]: [phone]  [place]: [phone]      [facility]  Shanelle Kwong MA, LPC,[facility]  Individual, couples, and family counseling  [address] [place], [place] [address] [place], [place]   [phone]    [facility]  [address] [place] [address]  [phone]  Hebert Vicente LCSW [facility]  [address]  [place] [address]  [phone]  Trish Garcia, Ph.D., M.P  Abhijeet Hinds, Ph.D., M.P      [facility]  [address] [place] [address]  Kay Vanegas [facility]  [phone]  Beata Junior LPC, [place]  [phone]        Jade Salinas, [facility], LMFT  [address] [email or web address]  [address]  [place] [address]  [phone]        Morelia Green MA, LPC  Phone: [phone]  Fax: [phone]  6yrs- Adult  Areas of Service: Depression, anxiety, medication management, substance abuse, anger management, coping with grief, communication skills, parenting skills, and addiction.  Accepts private insurance and cash payments        Dr. Delfino Potts- Mandaeism Meeta  [facility] [place] [facility]  [address]   [address]  [place], [place] [address]   [phone]  ACCEPTED INSURANCE:  [facility]  Aetna  [facility]  [facility]  Ceridian  Cigna  Com Psych  Humana  Christelle  [facility]  Value Options        Gianluca Coleman, GARY  Gnosticism counselor  [facility]  [place]  [address] [place], [place] [address]   [phone]  AVG Cost per Session: $80 - $130  ACCEPTED INSURANCE:  micecloud  Value Options  (License to [facility])        [facility]  [address] [address]  Accept Medicaid  Other Locations  [facility]H.C  [facility]  [facility]C  [facility] C.H.C  [facility] CH.C  [facility] C.H.C  [facility] C.C  [facility]  [facility].C    Our skilled providers specialize in treating:  PTSD, Anxiety and Depression, Bipolar Disorder, ADHD, Obesity, Marital Distress, Chronic Tension, Panic Attacks, as well as Phobias      [facility]  [address] [place] [address]  Hours: Monday-Friday, 8 a.m.-5 p.m  Phone: [phone]  [facility]  [address] [place] [address]  Tel. [phone]  Fax [phone]  [facility]  [address] [place] [address]  Tel. [phone]  Fax [phone]  [facility]  [address] [place] [address]  Tel. [phone]  Fax [phone]  [facility]  [address] [place] [address]  Tel. [phone]  Fax [phone]  Walk -In till 4pm all insurances accepted      [facility]  Our skilled providers specialize in treating:  Attention Deficit Hyperactivity Disorder  Attention Deficit Disorder  Obsessive Compulsive Disorder   Autism  Bipolar Disorder  Depression   Anxiety  A variety of other psychological disorders  [place]  Phone: [phone]  [place]  [address] [place], [place], [address] [phone]  [facility]  [facility] [place], [address]  [phone]  [address] [place], [address]  [phone]  [place]  [address] [place] [address]  [phone]  ACCEPTED INSURANCE:  AETNA  [facility]  CIGNA  [facility]  HUMANA  [place] MED[place]  [place] MEDICAID    MULTIPLAN PHCS UNITED HEALTHCARE [facility]  MD Mega Zavala NP Elisa Himel, NP  The providers of [facility] help patients age 16 and over with the treatment of a variety of mental disorders, including:  Stress  Depression  Anxiety  Schizophrenia  Dementia  Bipolar  Developmental disabilities  Other psychiatric mental health issues  [facility] [place]   [address]   [place] [address]   Hours: Monday-Friday, 8 a.m.-5 p.m.   Phone: [phone]   Fax: [phone]  ACCEPTED INSURANCE  Blue Cross (PPO, OGB-PPO)*  Humana  Medicare  *PPO: Preferred Provider Organization        Medicaid Community Psychiatry Clinics  [facility]: [phone]  [facility]: [phone]  [facility]: [phone]  [facility]: [phone]  [facility]: [phone]

## 2023-04-24 NOTE — PROGRESS NOTES
Problem List Items Addressed This Visit          Psychiatric    Anxiety    Overview     Chronic history; doing well on Paxil, hydroxyzine and buspar  Denies SI/HI; no hallucinations     Assoc panic attacks, improved with buspar  - list of local counselors given              Relevant Medications    hydrOXYzine pamoate (VISTARIL) 25 MG Cap    paroxetine (PAXIL) 30 MG tablet       Cardiac/Vascular    Hyperlipidemia    Overview     -chronic condition. Currently stable.    - lipids ordered   Hyperlipidemia Medications               atorvastatin (LIPITOR) 40 MG tablet Take 1 tablet (40 mg total) by mouth once daily.       No results found for: CHOL  No results found for: HDL  No results found for: LDLCALC  No results found for: TRIG  No results found for: CHOLHDL       The ASCVD Risk score (Deloris GUTIERRES, et al., 2019) failed to calculate for the following reasons:    Cannot find a previous HDL lab    Cannot find a previous total cholesterol lab           Relevant Medications    atorvastatin (LIPITOR) 40 MG tablet       Other    Tobacco use    Overview     Assistance with smoking cessation was offered, including:  [x]  Medications  [x]  Counseling  []  Printed Information on Smoking Cessation  [x]  Referral to a Smoking Cessation Program    Patient was counseled regarding smoking for 3-10 minutes.  Smoking about 1-2 ppd, about 44 pack year smoking hx  Reports she is not interested in quitting a this time          Relevant Orders    CT Chest Lung Screening Low Dose    (In Office Administered) Pneumococcal Conjugate Vaccine (20 Valent) (IM)     Other Visit Diagnoses       Need for hepatitis C screening test    -  Primary    Relevant Orders    Hepatitis C Antibody    Screening for HIV (human immunodeficiency virus)        Relevant Orders    HIV 1/2 Ag/Ab (4th Gen)    Annual physical exam        Relevant Medications    busPIRone (BUSPAR) 5 MG Tab    Other Relevant Orders    X-Ray Chest PA And Lateral    CBC Auto Differential     "Comprehensive Metabolic Panel    Hemoglobin A1C    Lipid Panel    TSH    Elevated glucose        Relevant Orders    CBC Auto Differential    Comprehensive Metabolic Panel    Hemoglobin A1C    Lipid Panel    TSH    Encounter for screening mammogram for malignant neoplasm of breast        Relevant Orders    Mammo Digital Screening Bilat w/ Andres    Colon cancer screening        Relevant Orders    Ambulatory referral/consult to Endo Procedure               Patient ID: Vesna Richards is a 57 y.o. female.    Chief Complaint:  establish care      Patient is here to establish care.   Seen in ED in march 2023 for sz/panic attacks. Reports hx of anxiety and PTSD. She saw previous therapist but would like to find new one. Reports feeling much better since add buspar to Paxil and hydroxyzine regimen.    Reports work is a big stressor, but plans to quit soon.    Per chart review:  "CT Head WO Contrast    Result Date: 3/15/2023  REASON FOR EXAM: Seizure, new-onset, no history of trauma TECHNICAL FACTORS: 5 mm contiguous axial CT images were obtained from the foramen magnum to the skull vertex. COMPARISON: None FINDINGS: The ventricles are normal in size and position. There is no evidence of acute intracranial hemorrhage or infarct. There is no evidence of mass, mass effect, or midline shift. No intra-axial or extra axial fluid collections. No focal gray-white matter abnormality. The basal cisterns are patent. The visualized orbits are normal in appearance. Paranasal sinuses are clear. Osseous structures are unremarkable.     No acute or significant intracranial abnormality.     57-year-old female presents for possible seizure-like activity, with 6 episodes over the past week. See HPI for details.    Please note video of alleges seizure was reviewed on the 's phone, there was no tonic-clonic seizure-like movements. Patient exhibited sitting against a wall, complaining of pain between her eyes and hyperventilating " "for approximately 2 minutes, patient had no amnesia or confusion, no postictal period, no loss of bladder or bowel.    Patient is nontoxic, non-ill-appearing. Vital signs are stable. Respirations are even, unlabored and clear to auscultation. Heart rhythm and rate is regular. Back exam unremarkable. No clinical signs of neurological deficits, meningismus or dehydration present.    Case discussed in detail with attending, Dr. Valdivia, who is in agreement the plan of care to obtain lab work, normal saline bolus, head CT, chest x-ray and EKG, though there is a strong suspicion for pseudoseizures likely related to anxiety.    Mild leukocytosis: 11.8, no anemia. CMP remarkable for hyperglycemia: 160, hypokalemia: 3.5; liver and renal functions within normal limits.  Head CT interpreted per radiologist: No acute or significant intracranial abnormality.  EKG reviewed per attending, see his note for interpretation.  Chest x-ray interpreted per radiologist: Prominent interstitial markings in the right lung base which could indicate aspiration pneumonitis given the history of seizure. Mild atelectasis or scarring would be alternative considerations.    Case rediscussed with attending, whom has discussed discharge planning with the patient and significant other at bedside with strong suspicion for possible seizure-like activity versus near syncope. All questions answered per attending, family was agreeable to discharge home. Precautions given, close follow-up recommended."      Otherwise, patient has been feeling well. No additional concerns. Denies nausea, vomiting, fevers, chills, abdominal pain, fatigue.     The patient has no Health Maintenance topics of status Not Due     ==============================================  History reviewed.   Health Maintenance Due   Topic Date Due    Hepatitis C Screening  Never done    Cervical Cancer Screening  Never done    Lipid Panel  Never done    COVID-19 Vaccine (1) Never done    " Pneumococcal Vaccines (Age 0-64) (1 - PCV) Never done    HIV Screening  Never done    Mammogram  Never done    Hemoglobin A1c (Diabetic Prevention Screening)  Never done    Colorectal Cancer Screening  Never done    LDCT Lung Screen  Never done    Shingles Vaccine (1 of 2) Never done    TETANUS VACCINE  2018    Influenza Vaccine (1) Never done       Past Medical History:  History reviewed. No pertinent past medical history.  Past Surgical History:   Procedure Laterality Date     SECTION       Review of patient's allergies indicates:   Allergen Reactions    Penicillin g Anaphylaxis    Penicillins Shortness Of Breath and Nausea And Vomiting    Prednisone Nausea And Vomiting     Current Outpatient Medications on File Prior to Visit   Medication Sig Dispense Refill    multivitamin capsule Take 1 capsule by mouth once daily.      [DISCONTINUED] albuterol (PROVENTIL/VENTOLIN HFA) 90 mcg/actuation inhaler ProAir HFA 90 mcg/actuation aerosol inhaler      [DISCONTINUED] busPIRone (BUSPAR) 5 MG Tab Take 5 mg by mouth 2 (two) times daily.      [DISCONTINUED] chlorzoxazone (PARAFON FORTE) 500 mg Tab Take 500 mg by mouth.      [DISCONTINUED] doxylamine-phenylephrine (POLY HIST FORTE, DOXYLAMINE,) 7.5-10 mg Tab Poly Hist Forte (doxylamine) 7.5 mg-10 mg tablet   TK 1 T PO Q 4 H PRN      [DISCONTINUED] gabapentin (NEURONTIN) 300 MG capsule Take 300 mg by mouth every evening.      [DISCONTINUED] hydrOXYzine pamoate (VISTARIL) 25 MG Cap Take 25 mg by mouth.      [DISCONTINUED] meloxicam (MOBIC) 15 MG tablet Take 15 mg by mouth.      [DISCONTINUED] methocarbamoL (ROBAXIN) 750 MG Tab Take 750 mg by mouth.      [DISCONTINUED] paroxetine (PAXIL) 10 MG tablet paroxetine 10 mg tablet      [DISCONTINUED] atorvastatin (LIPITOR) 40 MG tablet Take 40 mg by mouth.      [DISCONTINUED] HYDROcodone-acetaminophen (NORCO) 5-325 mg per tablet Take 1 tablet by mouth every 4 (four) hours as needed.      [DISCONTINUED] varenicline  (CHANTIX) 1 mg Tab Chantix 1 mg tablet       No current facility-administered medications on file prior to visit.     Social History     Socioeconomic History    Marital status:    Tobacco Use    Smoking status: Every Day     Packs/day: 3.00     Years: 37.00     Pack years: 111.00     Types: Cigarettes     History reviewed. No pertinent family history.       Review of Systems   12 point review of systems per hpi, otherwise negative         Objective:    Nursing note and vitals reviewed.  Vitals:    04/24/23 0835   BP: 114/68   Pulse:      Body mass index is 32.19 kg/m².     Physical Exam   Constitutional: SHE is oriented to person, place, and time. She appears well-developed and well-nourished. No distress.   HENT: WNL  Head: Normocephalic and atraumatic.   Eyes: Pupils are equal, round, and reactive to light. EOM are normal.   Neck: Normal range of motion. Neck supple.   Cardiovascular: Normal rate, regular rhythm, normal heart sounds and intact distal pulses.   No murmur heard.  Pulmonary/Chest: Effort normal and breath sounds normal. No respiratory distress. She has no wheezes.   GI: soft, non distended, no ttp, no rebound/guarding  Musculoskeletal: Normal range of motion. She exhibits no edema.   Neurological: She is alert and oriented to person, place, and time. No cranial nerve deficit.   Skin: Skin is warm and dry. Capillary refill takes less than 2 seconds.   Psychiatric: She has a normal mood and affect. Her behavior is normal.           Emily Dumont MD    We Offer Telehealth & Same Day Appointments!   Book your Telehealth appointment with me through my nurse or   Clinic appointments on [employer]!  [phone]     To Schedule appointments online, go to [employer]: [email or web address]

## 2023-05-02 ENCOUNTER — PATIENT MESSAGE (OUTPATIENT)
Dept: FAMILY MEDICINE | Facility: CLINIC | Age: 58
End: 2023-05-02
Payer: COMMERCIAL

## 2023-05-02 ENCOUNTER — HOSPITAL ENCOUNTER (OUTPATIENT)
Dept: RADIOLOGY | Facility: HOSPITAL | Age: 58
Discharge: HOME | End: 2023-05-02
Attending: STUDENT IN AN ORGANIZED HEALTH CARE EDUCATION/TRAINING PROGRAM
Payer: COMMERCIAL

## 2023-05-02 DIAGNOSIS — Z00.00 ANNUAL PHYSICAL EXAM: ICD-10-CM

## 2023-05-02 PROCEDURE — 71046 X-RAY EXAM CHEST 2 VIEWS: CPT | Mod: 26,,, | Performed by: RADIOLOGY

## 2023-05-02 PROCEDURE — 71046 XR CHEST PA AND LATERAL: ICD-10-PCS | Mod: 26,,, | Performed by: RADIOLOGY

## 2023-05-02 PROCEDURE — 71046 X-RAY EXAM CHEST 2 VIEWS: CPT | Mod: TC,PO

## 2023-05-04 ENCOUNTER — HOSPITAL ENCOUNTER (OUTPATIENT)
Dept: PREADMISSION TESTING | Facility: HOSPITAL | Age: 58
Discharge: HOME | End: 2023-05-04
Attending: INTERNAL MEDICINE
Payer: COMMERCIAL

## 2023-05-04 DIAGNOSIS — Z12.11 COLON CANCER SCREENING: Primary | ICD-10-CM

## 2023-05-05 RX ORDER — SODIUM, POTASSIUM,MAG SULFATES 17.5-3.13G
1 SOLUTION, RECONSTITUTED, ORAL ORAL DAILY
Qty: 1 KIT | Refills: 0 | Status: SHIPPED | OUTPATIENT
Start: 2023-05-05 | End: 2023-05-07

## 2023-05-29 PROBLEM — Z12.11 COLON CANCER SCREENING: Status: ACTIVE | Noted: 2023-05-29

## 2023-05-30 ENCOUNTER — TELEPHONE (OUTPATIENT)
Dept: FAMILY MEDICINE | Facility: CLINIC | Age: 58
End: 2023-05-30
Payer: COMMERCIAL

## 2023-05-30 NOTE — TELEPHONE ENCOUNTER
----- Message from Karolyn Telles sent at 5/29/2023  5:12 PM CDT -----    Patient Returning Call        Who Called:pt   Does the patient know what this is regarding?:Cancel procedure for 05/30#/2023 due to having seirzes from medication to prep for procedure  Would the patient rather a call back or a response via [facility]? call  Best Call Back Number:[phone]  Additional Information: call back

## 2023-06-01 ENCOUNTER — PATIENT MESSAGE (OUTPATIENT)
Dept: FAMILY MEDICINE | Facility: CLINIC | Age: 58
End: 2023-06-01
Payer: COMMERCIAL

## 2023-06-02 ENCOUNTER — OFFICE VISIT (OUTPATIENT)
Dept: FAMILY MEDICINE | Facility: CLINIC | Age: 58
End: 2023-06-02
Payer: COMMERCIAL

## 2023-06-02 VITALS
DIASTOLIC BLOOD PRESSURE: 76 MMHG | BODY MASS INDEX: 32.19 KG/M2 | HEART RATE: 100 BPM | OXYGEN SATURATION: 97 % | HEIGHT: 62 IN | SYSTOLIC BLOOD PRESSURE: 116 MMHG

## 2023-06-02 DIAGNOSIS — S99.922S FOOT INJURY, LEFT, SEQUELA: ICD-10-CM

## 2023-06-02 DIAGNOSIS — R56.9 SEIZURE-LIKE ACTIVITY: Primary | ICD-10-CM

## 2023-06-02 DIAGNOSIS — R55 SYNCOPE AND COLLAPSE: ICD-10-CM

## 2023-06-02 PROCEDURE — 99999 PR PBB SHADOW E&M-EST. PATIENT-LVL V: ICD-10-PCS | Mod: PBBFAC,,, | Performed by: NURSE PRACTITIONER

## 2023-06-02 PROCEDURE — 99999 PR PBB SHADOW E&M-EST. PATIENT-LVL V: CPT | Mod: PBBFAC,,, | Performed by: NURSE PRACTITIONER

## 2023-06-02 PROCEDURE — 99499 NO LOS: ICD-10-PCS | Mod: S$GLB,,, | Performed by: NURSE PRACTITIONER

## 2023-06-02 PROCEDURE — 99499 UNLISTED E&M SERVICE: CPT | Mod: S$GLB,,, | Performed by: NURSE PRACTITIONER

## 2023-06-03 PROBLEM — R20.0 NUMBNESS IN FEET: Status: ACTIVE | Noted: 2023-06-03

## 2023-06-03 NOTE — PROGRESS NOTES
"Pt schedule visit for f/u multiple episodes of seizure-like activity. Pt states went to the ER in March 2023 and has had multiple episodes since then, states most recent was on Wednesday; also states hurt left foot during this episode. Pt states, "I had just woken up and I went to the kitchen. I wasn't anxious about anything. I stood at the sink and the next thing I knew, I woke up on the floor. I don't remember anything after. My heart rate usually goes up. " Pt's spouse states "she was on there floor having a seizure." Pt states has never been assessed by neurology. During visit today, pt states, "I feel like I'm about to have one now, I feel bad." Pt refuses ambulance. Did not have seizure during visit today. Pt advised to report to the ER immediately; states will report to [facility]. Pt's PCP informed.     "

## 2023-06-14 ENCOUNTER — PATIENT MESSAGE (OUTPATIENT)
Dept: PREADMISSION TESTING | Facility: HOSPITAL | Age: 58
End: 2023-06-14
Payer: COMMERCIAL

## 2023-06-21 ENCOUNTER — OFFICE VISIT (OUTPATIENT)
Dept: FAMILY MEDICINE | Facility: CLINIC | Age: 58
End: 2023-06-21
Payer: COMMERCIAL

## 2023-06-21 VITALS
TEMPERATURE: 98 F | WEIGHT: 181.88 LBS | RESPIRATION RATE: 18 BRPM | HEART RATE: 62 BPM | SYSTOLIC BLOOD PRESSURE: 119 MMHG | OXYGEN SATURATION: 98 % | BODY MASS INDEX: 33.47 KG/M2 | DIASTOLIC BLOOD PRESSURE: 54 MMHG | HEIGHT: 62 IN

## 2023-06-21 DIAGNOSIS — F41.9 ANXIETY: ICD-10-CM

## 2023-06-21 DIAGNOSIS — Z72.0 TOBACCO USE: ICD-10-CM

## 2023-06-21 DIAGNOSIS — J01.10 ACUTE NON-RECURRENT FRONTAL SINUSITIS: Primary | ICD-10-CM

## 2023-06-21 DIAGNOSIS — Z12.4 PAP SMEAR FOR CERVICAL CANCER SCREENING: ICD-10-CM

## 2023-06-21 DIAGNOSIS — R56.9 SEIZURE-LIKE ACTIVITY: ICD-10-CM

## 2023-06-21 PROCEDURE — 99214 OFFICE O/P EST MOD 30 MIN: CPT | Mod: 25,S$GLB,, | Performed by: STUDENT IN AN ORGANIZED HEALTH CARE EDUCATION/TRAINING PROGRAM

## 2023-06-21 PROCEDURE — 1159F MED LIST DOCD IN RCRD: CPT | Mod: CPTII,S$GLB,, | Performed by: STUDENT IN AN ORGANIZED HEALTH CARE EDUCATION/TRAINING PROGRAM

## 2023-06-21 PROCEDURE — 1160F RVW MEDS BY RX/DR IN RCRD: CPT | Mod: CPTII,S$GLB,, | Performed by: STUDENT IN AN ORGANIZED HEALTH CARE EDUCATION/TRAINING PROGRAM

## 2023-06-21 PROCEDURE — 1159F PR MEDICATION LIST DOCUMENTED IN MEDICAL RECORD: ICD-10-PCS | Mod: CPTII,S$GLB,, | Performed by: STUDENT IN AN ORGANIZED HEALTH CARE EDUCATION/TRAINING PROGRAM

## 2023-06-21 PROCEDURE — 1160F PR REVIEW ALL MEDS BY PRESCRIBER/CLIN PHARMACIST DOCUMENTED: ICD-10-PCS | Mod: CPTII,S$GLB,, | Performed by: STUDENT IN AN ORGANIZED HEALTH CARE EDUCATION/TRAINING PROGRAM

## 2023-06-21 PROCEDURE — 99406 PR TOBACCO USE CESSATION INTERMEDIATE 3-10 MINUTES: ICD-10-PCS | Mod: S$GLB,,, | Performed by: STUDENT IN AN ORGANIZED HEALTH CARE EDUCATION/TRAINING PROGRAM

## 2023-06-21 PROCEDURE — 99214 PR OFFICE/OUTPT VISIT, EST, LEVL IV, 30-39 MIN: ICD-10-PCS | Mod: 25,S$GLB,, | Performed by: STUDENT IN AN ORGANIZED HEALTH CARE EDUCATION/TRAINING PROGRAM

## 2023-06-21 PROCEDURE — 3078F PR MOST RECENT DIASTOLIC BLOOD PRESSURE < 80 MM HG: ICD-10-PCS | Mod: CPTII,S$GLB,, | Performed by: STUDENT IN AN ORGANIZED HEALTH CARE EDUCATION/TRAINING PROGRAM

## 2023-06-21 PROCEDURE — 3074F SYST BP LT 130 MM HG: CPT | Mod: CPTII,S$GLB,, | Performed by: STUDENT IN AN ORGANIZED HEALTH CARE EDUCATION/TRAINING PROGRAM

## 2023-06-21 PROCEDURE — 3008F PR BODY MASS INDEX (BMI) DOCUMENTED: ICD-10-PCS | Mod: CPTII,S$GLB,, | Performed by: STUDENT IN AN ORGANIZED HEALTH CARE EDUCATION/TRAINING PROGRAM

## 2023-06-21 PROCEDURE — 99406 BEHAV CHNG SMOKING 3-10 MIN: CPT | Mod: S$GLB,,, | Performed by: STUDENT IN AN ORGANIZED HEALTH CARE EDUCATION/TRAINING PROGRAM

## 2023-06-21 PROCEDURE — 3074F PR MOST RECENT SYSTOLIC BLOOD PRESSURE < 130 MM HG: ICD-10-PCS | Mod: CPTII,S$GLB,, | Performed by: STUDENT IN AN ORGANIZED HEALTH CARE EDUCATION/TRAINING PROGRAM

## 2023-06-21 PROCEDURE — 99999 PR PBB SHADOW E&M-EST. PATIENT-LVL V: ICD-10-PCS | Mod: PBBFAC,,, | Performed by: STUDENT IN AN ORGANIZED HEALTH CARE EDUCATION/TRAINING PROGRAM

## 2023-06-21 PROCEDURE — 3008F BODY MASS INDEX DOCD: CPT | Mod: CPTII,S$GLB,, | Performed by: STUDENT IN AN ORGANIZED HEALTH CARE EDUCATION/TRAINING PROGRAM

## 2023-06-21 PROCEDURE — 3078F DIAST BP <80 MM HG: CPT | Mod: CPTII,S$GLB,, | Performed by: STUDENT IN AN ORGANIZED HEALTH CARE EDUCATION/TRAINING PROGRAM

## 2023-06-21 PROCEDURE — 99999 PR PBB SHADOW E&M-EST. PATIENT-LVL V: CPT | Mod: PBBFAC,,, | Performed by: STUDENT IN AN ORGANIZED HEALTH CARE EDUCATION/TRAINING PROGRAM

## 2023-06-21 RX ORDER — AZITHROMYCIN 250 MG/1
TABLET, FILM COATED ORAL
Qty: 6 TABLET | Refills: 0 | Status: SHIPPED | OUTPATIENT
Start: 2023-06-21 | End: 2023-07-19 | Stop reason: ALTCHOICE

## 2023-06-21 RX ORDER — LEVETIRACETAM 500 MG/1
500 TABLET ORAL 2 TIMES DAILY
Qty: 60 TABLET | Refills: 0 | Status: SHIPPED | OUTPATIENT
Start: 2023-06-21 | End: 2023-08-01

## 2023-06-21 NOTE — PATIENT INSTRUCTIONS
To wean off buspar: decrease to once daily. Then, skip a dose every other day for few days, then skip 2 days for a few days, then skip 3-4 days for a few days. The total wean should take about 2-3 weeks. You may experience headache, dizziness, increased anxiety. Be sure to drink plenty of water.

## 2023-06-21 NOTE — PROGRESS NOTES
"Problem List Items Addressed This Visit          Neuro    Seizure-like activity    Overview     Admitted for sz like activity in early June '23. Reports buspar and paroxetine interaction caused sz. Paroxetine dc'd. Still taking buspar, will start wean. Last sz early June '23. Significantly improved from previous. Started on keppra.  - referral to neurology          Relevant Orders    Ambulatory referral/consult to Neurosurgery       Psychiatric    Anxiety    Overview     Chronic history; hydroxyzine BID  Denies SI/HI; no hallucinations     Assoc panic attacks, improved with buspar; however, she would like to wean.   She will cont hydroxyzine         Reports paxil and buspar caused sz-like activity              Other    Tobacco use    Overview     Assistance with smoking cessation was offered, including:  [x]  Medications  [x]  Counseling  []  Printed Information on Smoking Cessation  [x]  Referral to a Smoking Cessation Program    Patient was counseled regarding smoking for 3-10 minutes.  Smoking about 1ppd, about 44 pack year smoking hx  Reports she is working to cont cutting down          Other Visit Diagnoses       Acute non-recurrent frontal sinusitis    -  Primary    Relevant Medications    azithromycin (Z-TINO) 250 MG tablet    Pap smear for cervical cancer screening        Relevant Orders    Ambulatory referral/consult to Obstetrics / Gynecology                Follow up in about 9 months (around 3/21/2024).    Emily Dumont MD  _________________________________________________________________________      Patient ID: Vesna Richards is a 57 y.o. female.    Chief Complaint: hospital follow up    Admitted for sz like activity in early June '23. Reports buspar and paroxetine interaction caused sz. Paroxetine dc'd. Still taking buspar  Last sz early June. Significantly improved from previous. Started on keppra.     Pressure like sensation, feels similar to her previous sinus infection.     Per chart review:  "She " "was admitted for further eval and treatment of possible seizure vs syncopal events. Neuro consulted. EEG did not find any seizure activity. MRI brain with area of right temporal encephalomalacia-- no acute findings. Per Neuro-- event does not appear to have been a seizure based on video, however she may be at risk for seizures given scar on brain. She was started on low dose keppra. Paxil will be reduced for possible lowering of seizure threshold. She should talk to her PCP about weaning this to off. Echo without any acute findings. ECG with LBBB. Cardiology consulted-- recommend outpatient holter monitor. No concern for acute ischemia. She was cleared for discharge to home. Seizure precautions discussed, including no driving until six months seizure free. She will follow-up with PCP, cardiology, and neurology."    Otherwise, patient has been feeling well. No additional concerns. Denies nausea, vomiting, fevers, chills, abdominal pain, fatigue.       Past medical histories reviewed, including past medical, surgical, family and social histories.      Current Outpatient Medications on File Prior to Visit   Medication Sig Dispense Refill    atorvastatin (LIPITOR) 40 MG tablet Take 1 tablet (40 mg total) by mouth once daily. 90 tablet 3    busPIRone (BUSPAR) 5 MG Tab Take 1 tablet (5 mg total) by mouth 2 (two) times daily. 180 tablet 3    hydrOXYzine pamoate (VISTARIL) 25 MG Cap Take 1 capsule (25 mg total) by mouth 2 (two) times a day. 180 capsule 3    [DISCONTINUED] levETIRAcetam (KEPPRA) 500 MG Tab Take 1 tablet (500 mg total) by mouth 2 (two) times daily. 60 tablet 0    [DISCONTINUED] paroxetine (PAXIL) 30 MG tablet Take 1 tablet (30 mg total) by mouth once daily. 180 tablet 3     No current facility-administered medications on file prior to visit.       Review of Systems   12 point review of systems negative except for listed in HPI.     Objective:    Nursing note and vitals reviewed.  Vitals:    06/21/23 0929 "   BP: (!) 119/54   Pulse: 62   Resp: 18   Temp: 97.5 °F (36.4 °C)     Body mass index is 33.27 kg/m².     Physical Exam   Constitutional: oriented to person, place, and time. well-developed and well-nourished. No distress.   HENT: WNL  Head: Normocephalic and atraumatic.   Eyes: EOM are normal.   Neck: Normal range of motion. Neck supple.   Cardiovascular: Normal rate  Pulmonary/Chest: Effort normal. No respiratory distress.   Musculoskeletal: Normal range of motion. no edema.   Neurological: CN II-XII intact  Skin: warm and dry.   Psychiatric: normal mood and affect. behavior is normal.           We Offer Telehealth & Same Day Appointments!   Book your Telehealth appointment with me through my nurse or   Clinic appointments on [facility] [facility]!  [phone]     To Schedule appointments online, go to [email or web address]: [email or web address]

## 2023-07-18 ENCOUNTER — OFFICE VISIT (OUTPATIENT)
Dept: NEUROSURGERY | Facility: CLINIC | Age: 58
End: 2023-07-18
Payer: COMMERCIAL

## 2023-07-18 VITALS — WEIGHT: 230.88 LBS | BODY MASS INDEX: 42.22 KG/M2

## 2023-07-18 DIAGNOSIS — R56.9 SEIZURE-LIKE ACTIVITY: ICD-10-CM

## 2023-07-18 PROCEDURE — 99499 NO LOS: ICD-10-PCS | Mod: S$GLB,,, | Performed by: PHYSICIAN ASSISTANT

## 2023-07-18 PROCEDURE — 99499 UNLISTED E&M SERVICE: CPT | Mod: S$GLB,,, | Performed by: PHYSICIAN ASSISTANT

## 2023-07-18 RX ORDER — CHOLECALCIFEROL (VITAMIN D3) 25 MCG
5000 TABLET ORAL DAILY
COMMUNITY

## 2023-07-24 ENCOUNTER — PATIENT MESSAGE (OUTPATIENT)
Dept: FAMILY MEDICINE | Facility: CLINIC | Age: 58
End: 2023-07-24
Payer: COMMERCIAL

## 2023-07-29 NOTE — TELEPHONE ENCOUNTER
Refill Routing Note   Medication(s) are not appropriate for processing by [facility] for the following reason(s):      Medication outside of protocol    [facility] action(s):  Route Care Due:  None identified              Appointments  past 12m or future 3m with PCP    Date Provider   Last Visit   6/21/2023 Emily Dumont MD   Next Visit   Visit date not found Emily Dumont MD   ED visits in past 90 days: 0        Note composed:7:52 PM 07/28/2023

## 2023-08-01 RX ORDER — LEVETIRACETAM 500 MG/1
TABLET ORAL
Qty: 60 TABLET | Refills: 0 | Status: SHIPPED | OUTPATIENT
Start: 2023-08-01 | End: 2023-08-14 | Stop reason: SDUPTHER

## 2023-08-06 ENCOUNTER — PATIENT MESSAGE (OUTPATIENT)
Dept: FAMILY MEDICINE | Facility: CLINIC | Age: 58
End: 2023-08-06
Payer: COMMERCIAL

## 2023-08-06 DIAGNOSIS — R56.9 SEIZURE-LIKE ACTIVITY: Primary | ICD-10-CM

## 2023-08-07 NOTE — TELEPHONE ENCOUNTER
Orders Placed This Encounter   Procedures    Ambulatory referral/consult to Neurology     Standing Status:   Future     Standing Expiration Date:   9/7/2024     Referral Priority:   Routine     Referral Type:   Consultation     Referral Reason:   Specialty Services Required     Requested Specialty:   Neurology     Number of Visits Requested:   1

## 2023-08-14 ENCOUNTER — OFFICE VISIT (OUTPATIENT)
Dept: NEUROLOGY | Facility: CLINIC | Age: 58
End: 2023-08-14
Payer: COMMERCIAL

## 2023-08-14 ENCOUNTER — LAB VISIT (OUTPATIENT)
Dept: LAB | Facility: HOSPITAL | Age: 58
End: 2023-08-14
Attending: PSYCHIATRY & NEUROLOGY
Payer: COMMERCIAL

## 2023-08-14 VITALS
HEART RATE: 89 BPM | HEIGHT: 62 IN | BODY MASS INDEX: 35.7 KG/M2 | RESPIRATION RATE: 16 BRPM | WEIGHT: 194 LBS | DIASTOLIC BLOOD PRESSURE: 75 MMHG | SYSTOLIC BLOOD PRESSURE: 121 MMHG | OXYGEN SATURATION: 99 %

## 2023-08-14 DIAGNOSIS — R55 SYNCOPE, UNSPECIFIED SYNCOPE TYPE: ICD-10-CM

## 2023-08-14 DIAGNOSIS — R56.9 SEIZURES: ICD-10-CM

## 2023-08-14 DIAGNOSIS — R20.0 NUMBNESS IN FEET: ICD-10-CM

## 2023-08-14 DIAGNOSIS — R23.3 EASY BRUISABILITY: ICD-10-CM

## 2023-08-14 DIAGNOSIS — F41.9 ANXIETY: ICD-10-CM

## 2023-08-14 DIAGNOSIS — R56.9 SEIZURES: Primary | ICD-10-CM

## 2023-08-14 DIAGNOSIS — Z91.410 HISTORY OF PHYSICAL ABUSE IN ADULTHOOD: ICD-10-CM

## 2023-08-14 DIAGNOSIS — Z62.810 HISTORY OF PHYSICAL ABUSE IN CHILDHOOD: ICD-10-CM

## 2023-08-14 DIAGNOSIS — F19.10 POLYSUBSTANCE ABUSE: ICD-10-CM

## 2023-08-14 DIAGNOSIS — E78.2 MIXED HYPERLIPIDEMIA: ICD-10-CM

## 2023-08-14 DIAGNOSIS — Z72.0 TOBACCO USE: ICD-10-CM

## 2023-08-14 DIAGNOSIS — Z87.820 HISTORY OF MULTIPLE CONCUSSIONS: ICD-10-CM

## 2023-08-14 DIAGNOSIS — J30.9 ALLERGIC RHINITIS, UNSPECIFIED SEASONALITY, UNSPECIFIED TRIGGER: ICD-10-CM

## 2023-08-14 PROBLEM — Z12.11 COLON CANCER SCREENING: Status: RESOLVED | Noted: 2023-05-29 | Resolved: 2023-08-14

## 2023-08-14 LAB
BASOPHILS # BLD AUTO: 0.04 K/UL (ref 0–0.2)
BASOPHILS NFR BLD: 0.4 % (ref 0–1.9)
DIFFERENTIAL METHOD: ABNORMAL
EOSINOPHIL # BLD AUTO: 0.1 K/UL (ref 0–0.5)
EOSINOPHIL NFR BLD: 1.2 % (ref 0–8)
ERYTHROCYTE [DISTWIDTH] IN BLOOD BY AUTOMATED COUNT: 13.6 % (ref 11.5–14.5)
HCT VFR BLD AUTO: 43.3 % (ref 37–48.5)
HGB BLD-MCNC: 13.8 G/DL (ref 12–16)
IMM GRANULOCYTES # BLD AUTO: 0.03 K/UL (ref 0–0.04)
IMM GRANULOCYTES NFR BLD AUTO: 0.3 % (ref 0–0.5)
LYMPHOCYTES # BLD AUTO: 3.4 K/UL (ref 1–4.8)
LYMPHOCYTES NFR BLD: 36.8 % (ref 18–48)
MCH RBC QN AUTO: 29.9 PG (ref 27–31)
MCHC RBC AUTO-ENTMCNC: 31.9 G/DL (ref 32–36)
MCV RBC AUTO: 94 FL (ref 82–98)
MONOCYTES # BLD AUTO: 0.7 K/UL (ref 0.3–1)
MONOCYTES NFR BLD: 7.8 % (ref 4–15)
NEUTROPHILS # BLD AUTO: 4.9 K/UL (ref 1.8–7.7)
NEUTROPHILS NFR BLD: 53.5 % (ref 38–73)
NRBC BLD-RTO: 0 /100 WBC
PLATELET # BLD AUTO: 302 K/UL (ref 150–450)
PMV BLD AUTO: 10.8 FL (ref 9.2–12.9)
RBC # BLD AUTO: 4.62 M/UL (ref 4–5.4)
WBC # BLD AUTO: 9.16 K/UL (ref 3.9–12.7)

## 2023-08-14 PROCEDURE — 3008F PR BODY MASS INDEX (BMI) DOCUMENTED: ICD-10-PCS | Mod: CPTII,S$GLB,, | Performed by: PSYCHIATRY & NEUROLOGY

## 2023-08-14 PROCEDURE — 3078F DIAST BP <80 MM HG: CPT | Mod: CPTII,S$GLB,, | Performed by: PSYCHIATRY & NEUROLOGY

## 2023-08-14 PROCEDURE — 99205 OFFICE O/P NEW HI 60 MIN: CPT | Mod: S$GLB,,, | Performed by: PSYCHIATRY & NEUROLOGY

## 2023-08-14 PROCEDURE — 1159F MED LIST DOCD IN RCRD: CPT | Mod: CPTII,S$GLB,, | Performed by: PSYCHIATRY & NEUROLOGY

## 2023-08-14 PROCEDURE — 3074F SYST BP LT 130 MM HG: CPT | Mod: CPTII,S$GLB,, | Performed by: PSYCHIATRY & NEUROLOGY

## 2023-08-14 PROCEDURE — 1159F PR MEDICATION LIST DOCUMENTED IN MEDICAL RECORD: ICD-10-PCS | Mod: CPTII,S$GLB,, | Performed by: PSYCHIATRY & NEUROLOGY

## 2023-08-14 PROCEDURE — 36415 COLL VENOUS BLD VENIPUNCTURE: CPT | Performed by: PSYCHIATRY & NEUROLOGY

## 2023-08-14 PROCEDURE — 3074F PR MOST RECENT SYSTOLIC BLOOD PRESSURE < 130 MM HG: ICD-10-PCS | Mod: CPTII,S$GLB,, | Performed by: PSYCHIATRY & NEUROLOGY

## 2023-08-14 PROCEDURE — 85025 COMPLETE CBC W/AUTO DIFF WBC: CPT | Performed by: PSYCHIATRY & NEUROLOGY

## 2023-08-14 PROCEDURE — 99999 PR PBB SHADOW E&M-EST. PATIENT-LVL IV: CPT | Mod: PBBFAC,,, | Performed by: PSYCHIATRY & NEUROLOGY

## 2023-08-14 PROCEDURE — 99417 PROLNG OP E/M EACH 15 MIN: CPT | Mod: S$GLB,,, | Performed by: PSYCHIATRY & NEUROLOGY

## 2023-08-14 PROCEDURE — 99417 PR PROLONGED SVC, OUTPT, W/WO DIRECT PT CONTACT,  EA ADDTL 15 MIN: ICD-10-PCS | Mod: S$GLB,,, | Performed by: PSYCHIATRY & NEUROLOGY

## 2023-08-14 PROCEDURE — 80177 DRUG SCRN QUAN LEVETIRACETAM: CPT | Performed by: PSYCHIATRY & NEUROLOGY

## 2023-08-14 PROCEDURE — 3078F PR MOST RECENT DIASTOLIC BLOOD PRESSURE < 80 MM HG: ICD-10-PCS | Mod: CPTII,S$GLB,, | Performed by: PSYCHIATRY & NEUROLOGY

## 2023-08-14 PROCEDURE — 99205 PR OFFICE/OUTPT VISIT, NEW, LEVL V, 60-74 MIN: ICD-10-PCS | Mod: S$GLB,,, | Performed by: PSYCHIATRY & NEUROLOGY

## 2023-08-14 PROCEDURE — 99999 PR PBB SHADOW E&M-EST. PATIENT-LVL IV: ICD-10-PCS | Mod: PBBFAC,,, | Performed by: PSYCHIATRY & NEUROLOGY

## 2023-08-14 PROCEDURE — 3008F BODY MASS INDEX DOCD: CPT | Mod: CPTII,S$GLB,, | Performed by: PSYCHIATRY & NEUROLOGY

## 2023-08-14 RX ORDER — LEVETIRACETAM 500 MG/1
500 TABLET ORAL 2 TIMES DAILY
Qty: 180 TABLET | Refills: 3 | Status: SHIPPED | OUTPATIENT
Start: 2023-08-14 | End: 2023-10-26 | Stop reason: SDUPTHER
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
## 2023-08-14 NOTE — PATIENT INSTRUCTIONS
The patient was encouraged to maintain full traditional seizure precautions which include but not limited to avoidance of driving, biking, high altitudes (ladders, escalators, rock climbing, mountain climbing, skiing, vika diving, moderate to difficult hiking), proximity to fire or fire source, proximity to body of water or swimming alone, operating heavy and potentially risky machinery, using sharp objects, using exercise machines like treadmill and weight lifting. Walking while accompanied on soft surfaces like grass is preferable.The patient was encouraged to shower (without accumulation of water) instead of taking a bath if unsupervised. The patient was made aware that these precautions are especially important during concurrent illnesses, fever, infections, vomiting, changing medications and running out of anti-seizure medications. Instructed the patient to avoid night shifts, sleep deprivation and alcohol or recreational drug use as adequate sleep and avoidance of alcohol/drugs are very important measures to assure good seizure control. The patient was also advised not to care for young children without company. The patient is advised to pad the side rails with pillows and blankets if applicable.I strongly recommended lowering the bed to the floor level to decrease the risk of falls during nocturnal seizures that occur during sleep. I also instructed the patient to avoid safety sensitive duties. In general, any activity that requires full awareness and would result in serious injury to self and others if a seizure takes place should be avoided.        Made the patient aware that the duration of restrictions/precautions varies and in [place] it is 6 months. The patient verbalized  full understanding.                             AVOID any substance that could lower seizure threshold including but not limited to:        ALCOHOL AND WITHDRAWAL      TRAMADOL.     MEPERIDINE (DEMEROL)     ALL STIMULANTS-ALL ADHD  MEDICATIONS.      CLOZAPINE.      BUPROPION (WELLBUTRIN)     CIPROFLOXACIN.    CYCLOSPORINE.     METOCLOPRAMIDE (REGLAN).     TETRAHYDROCANNABINOL (THC)    JOAO                FAMILY TEACHING ON HANDLING SEIZURES     Do not try to stop the seizure.    No not put anything is the patient's mouth.    Place on the patient's side in a safe place and keep the patient safe and away from any sharp objects.    Call 911 for seizure that lasts >3 minutes, repetitive seizures or the patient not regaining consciousness after the seizure.    Videotape the seizure if possible.

## 2023-08-15 ENCOUNTER — PATIENT OUTREACH (OUTPATIENT)
Dept: ADMINISTRATIVE | Facility: HOSPITAL | Age: 58
End: 2023-08-15
Payer: COMMERCIAL

## 2023-08-15 DIAGNOSIS — Z12.11 SPECIAL SCREENING FOR MALIGNANT NEOPLASMS, COLON: Primary | ICD-10-CM

## 2023-08-17 ENCOUNTER — TELEPHONE (OUTPATIENT)
Dept: NEUROLOGY | Facility: CLINIC | Age: 58
End: 2023-08-17
Payer: COMMERCIAL

## 2023-08-17 LAB — LEVETIRACETAM SERPL-MCNC: 10.4 UG/ML (ref 3–60)

## 2023-08-17 NOTE — TELEPHONE ENCOUNTER
----- Message from Sharri Kelly MD sent at 8/17/2023  7:48 AM CDT -----    08-    LEV level is 10.4 Good    CBC namely Platelets is normal and Platelets actually improved from 167 to 302.    Continue LEV. It has nothing to do with the bruising. If the easy bruising continues see a hematologist

## 2023-08-17 NOTE — PROGRESS NOTES
08-    LEV level is 10.4 Good    CBC namely Platelets is normal and Platelets actually improved from 167 to 302.    Continue LEV. It has nothing to do with the bruising. If the easy bruising continues see a hematologist

## 2023-09-14 ENCOUNTER — HOSPITAL ENCOUNTER (OUTPATIENT)
Dept: PREADMISSION TESTING | Facility: HOSPITAL | Age: 58
Discharge: HOME | End: 2023-09-14
Attending: INTERNAL MEDICINE
Payer: COMMERCIAL

## 2023-09-14 VITALS — WEIGHT: 194 LBS | HEIGHT: 62 IN | BODY MASS INDEX: 35.7 KG/M2

## 2023-09-14 DIAGNOSIS — Z12.11 SPECIAL SCREENING FOR MALIGNANT NEOPLASMS, COLON: ICD-10-CM

## 2023-10-05 DIAGNOSIS — F41.9 ANXIETY: ICD-10-CM

## 2023-10-05 RX ORDER — HYDROXYZINE PAMOATE 25 MG/1
25 CAPSULE ORAL 2 TIMES DAILY
Qty: 180 CAPSULE | Refills: 0 | Status: SHIPPED | OUTPATIENT
Start: 2023-10-05 | End: 2023-10-17

## 2023-10-05 NOTE — TELEPHONE ENCOUNTER
Refill Routing Note   Medication(s) are not appropriate for processing by [facility] for the following reason(s):      Medication outside of protocol    [facility] action(s):  Route Care Due:  None identified              Appointments  past 12m or future 3m with PCP    Date Provider   Last Visit   6/21/2023 Emily Dumont MD   Next Visit   3/21/2024 Emily Dumont MD   ED visits in past 90 days: 0        Note composed:1:52 PM 10/05/2023

## 2023-10-06 ENCOUNTER — PATIENT MESSAGE (OUTPATIENT)
Dept: FAMILY MEDICINE | Facility: CLINIC | Age: 58
End: 2023-10-06
Payer: COMMERCIAL

## 2023-10-11 ENCOUNTER — OFFICE VISIT (OUTPATIENT)
Dept: FAMILY MEDICINE | Facility: CLINIC | Age: 58
End: 2023-10-11
Payer: COMMERCIAL

## 2023-10-11 VITALS
BODY MASS INDEX: 35.16 KG/M2 | HEART RATE: 81 BPM | OXYGEN SATURATION: 97 % | TEMPERATURE: 98 F | WEIGHT: 192.31 LBS | DIASTOLIC BLOOD PRESSURE: 72 MMHG | SYSTOLIC BLOOD PRESSURE: 114 MMHG

## 2023-10-11 DIAGNOSIS — F41.9 ANXIETY AND DEPRESSION: Primary | ICD-10-CM

## 2023-10-11 DIAGNOSIS — F32.A ANXIETY AND DEPRESSION: Primary | ICD-10-CM

## 2023-10-11 PROCEDURE — 3074F PR MOST RECENT SYSTOLIC BLOOD PRESSURE < 130 MM HG: ICD-10-PCS | Mod: CPTII,S$GLB,, | Performed by: NURSE PRACTITIONER

## 2023-10-11 PROCEDURE — 1159F PR MEDICATION LIST DOCUMENTED IN MEDICAL RECORD: ICD-10-PCS | Mod: CPTII,S$GLB,, | Performed by: NURSE PRACTITIONER

## 2023-10-11 PROCEDURE — 99213 PR OFFICE/OUTPT VISIT, EST, LEVL III, 20-29 MIN: ICD-10-PCS | Mod: S$GLB,,, | Performed by: NURSE PRACTITIONER

## 2023-10-11 PROCEDURE — 1160F PR REVIEW ALL MEDS BY PRESCRIBER/CLIN PHARMACIST DOCUMENTED: ICD-10-PCS | Mod: CPTII,S$GLB,, | Performed by: NURSE PRACTITIONER

## 2023-10-11 PROCEDURE — 3008F PR BODY MASS INDEX (BMI) DOCUMENTED: ICD-10-PCS | Mod: CPTII,S$GLB,, | Performed by: NURSE PRACTITIONER

## 2023-10-11 PROCEDURE — 3074F SYST BP LT 130 MM HG: CPT | Mod: CPTII,S$GLB,, | Performed by: NURSE PRACTITIONER

## 2023-10-11 PROCEDURE — 1159F MED LIST DOCD IN RCRD: CPT | Mod: CPTII,S$GLB,, | Performed by: NURSE PRACTITIONER

## 2023-10-11 PROCEDURE — 99999 PR PBB SHADOW E&M-EST. PATIENT-LVL IV: ICD-10-PCS | Mod: PBBFAC,,, | Performed by: NURSE PRACTITIONER

## 2023-10-11 PROCEDURE — 1160F RVW MEDS BY RX/DR IN RCRD: CPT | Mod: CPTII,S$GLB,, | Performed by: NURSE PRACTITIONER

## 2023-10-11 PROCEDURE — 3078F PR MOST RECENT DIASTOLIC BLOOD PRESSURE < 80 MM HG: ICD-10-PCS | Mod: CPTII,S$GLB,, | Performed by: NURSE PRACTITIONER

## 2023-10-11 PROCEDURE — 99999 PR PBB SHADOW E&M-EST. PATIENT-LVL IV: CPT | Mod: PBBFAC,,, | Performed by: NURSE PRACTITIONER

## 2023-10-11 PROCEDURE — 3078F DIAST BP <80 MM HG: CPT | Mod: CPTII,S$GLB,, | Performed by: NURSE PRACTITIONER

## 2023-10-11 PROCEDURE — 99213 OFFICE O/P EST LOW 20 MIN: CPT | Mod: S$GLB,,, | Performed by: NURSE PRACTITIONER

## 2023-10-11 PROCEDURE — 3008F BODY MASS INDEX DOCD: CPT | Mod: CPTII,S$GLB,, | Performed by: NURSE PRACTITIONER

## 2023-10-11 RX ORDER — ESCITALOPRAM OXALATE 10 MG/1
10 TABLET ORAL DAILY
Qty: 30 TABLET | Refills: 1 | Status: SHIPPED | OUTPATIENT
Start: 2023-10-11 | End: 2023-10-17

## 2023-10-11 NOTE — PROGRESS NOTES
Subjective     Patient ID: Vesna Richards is a 58 y.o. female.    Chief Complaint: Anxiety    Anxiety  Presents for initial visit. Onset was more than 5 years ago. The problem has been gradually worsening (over the past week). Symptoms include depressed mood, insomnia and nervous/anxious behavior. Patient reports no chest pain, compulsions, confusion, decreased concentration, dizziness, dry mouth, excessive worry, feeling of choking, hyperventilation, impotence, irritability, malaise, muscle tension, nausea, obsessions, palpitations, panic, restlessness, shortness of breath or suicidal ideas. Symptoms occur most days. The severity of symptoms is moderate. Nothing aggravates the symptoms. The quality of sleep is fair. Nighttime awakenings: several.     Risk factors include prior traumatic experience and change in medication (Pt states took paxil in the past but discontinued when keppra started). Her past medical history is significant for anxiety/panic attacks and depression. There is no history of anemia, arrhythmia, asthma, bipolar disorder, CAD, CHF, chronic lung disease, fibromyalgia, hyperthyroidism or suicide attempts. Past treatments include SSRIs. The treatment provided significant relief. Compliance with prior treatments has been good.   DepressionPatient presents with the following symptoms: depressed mood, insomnia and nervousness/anxiety.  Patient is not experiencing: compulsions, confusion, decreased concentration, dry mouth, excessive worry, hyperventilation, impotence, irritability, malaise, muscle tension, obsessions, palpitations, panic, restlessness, shortness of breath and suicidal ideas.    Patient has a history of: anxiety/panic attacks  No history of: arrhythmia, asthma, CAD, chronic lung disease and hyperthyroidism      Past Medical History:   Diagnosis Date    Seizures      Social History     Socioeconomic History    Marital status:    Tobacco Use    Smoking status: Every Day      Current packs/day: 1.50     Average packs/day: 2.2 packs/day for 81.8 years (178.2 ttl pk-yrs)     Types: Cigarettes     Start date:    Substance and Sexual Activity    Alcohol use: Not Currently    Drug use: Never    Sexual activity: Yes     Partners: Male     Social Determinants of Health     Financial Resource Strain: Low Risk  (6/3/2023)    Overall Financial Resource Strain (CARDIA)     Difficulty of Paying Living Expenses: Not very hard   Food Insecurity: Food Insecurity Present (6/3/2023)    Hunger Vital Sign     Worried About Running Out of Food in the Last Year: Sometimes true     Ran Out of Food in the Last Year: Never true   Transportation Needs: No Transportation Needs (6/3/2023)    PRAPARE - Transportation     Lack of Transportation (Medical): No     Lack of Transportation (Non-Medical): No   Physical Activity: Inactive (6/3/2023)    Exercise Vital Sign     Days of Exercise per Week: 0 days     Minutes of Exercise per Session: 0 min   Stress: Stress Concern Present (6/3/2023)    [employer] - Occupational Stress Questionnaire     Feeling of Stress : Very much   Social Connections: Moderately Isolated (6/3/2023)    Social Connection and Isolation Panel [NHANES]     Frequency of Communication with Friends and Family: More than three times a week     Frequency of Social Gatherings with Friends and Family: Once a week     Attends Nondenominational Services: Never     Active Member of Clubs or Organizations: No     Attends Club or Organization Meetings: Never     Marital Status:    Housing Stability: Low Risk  (6/3/2023)    Housing Stability Vital Sign     Unable to Pay for Housing in the Last Year: No     Number of Places Lived in the Last Year: 1     Unstable Housing in the Last Year: No     Past Surgical History:   Procedure Laterality Date     SECTION         Review of Systems   Constitutional: Negative.  Negative for irritability.   HENT: Negative.     Eyes:  Negative.    Respiratory: Negative.  Negative for shortness of breath.    Cardiovascular: Negative.  Negative for chest pain and palpitations.   Gastrointestinal: Negative.  Negative for nausea.   Endocrine: Negative.    Genitourinary: Negative.  Negative for impotence.   Musculoskeletal: Negative.    Integumentary:  Negative.   Allergic/Immunologic: Negative.    Neurological: Negative.  Negative for dizziness.   Psychiatric/Behavioral:  Positive for depression and dysphoric mood. Negative for confusion, decreased concentration and suicidal ideas. The patient is nervous/anxious and has insomnia.           Objective     Physical Exam  Vitals and nursing note reviewed.   Constitutional:       Appearance: Normal appearance.   HENT:      Head: Normocephalic.      Right Ear: Tympanic membrane, ear canal and external ear normal.      Left Ear: Tympanic membrane, ear canal and external ear normal.      Nose: Nose normal.      Mouth/Throat:      Mouth: Mucous membranes are moist.      Pharynx: Oropharynx is clear.   Eyes:      Conjunctiva/sclera: Conjunctivae normal.      Pupils: Pupils are equal, round, and reactive to light.   Cardiovascular:      Rate and Rhythm: Normal rate and regular rhythm.      Pulses: Normal pulses.      Heart sounds: Normal heart sounds.   Pulmonary:      Effort: Pulmonary effort is normal.      Breath sounds: Normal breath sounds.   Abdominal:      General: Bowel sounds are normal.      Palpations: Abdomen is soft.   Musculoskeletal:         General: Normal range of motion.      Cervical back: Normal range of motion and neck supple.   Skin:     General: Skin is warm and dry.      Capillary Refill: Capillary refill takes 2 to 3 seconds.   Neurological:      Mental Status: She is alert and oriented to person, place, and time.   Psychiatric:         Attention and Perception: Attention and perception normal.         Mood and Affect: Mood is anxious and depressed.         Behavior: Behavior normal.          Thought Content: Thought content normal.         Judgment: Judgment normal.            Assessment and Plan     1. Anxiety and depression  -     Ambulatory referral/consult to Psychiatry; Future; Expected date: 10/18/2023         -     EScitalopram oxalate (LEXAPRO) 10 MG tablet; Take 1 tablet (10 mg total) by mouth once daily.  Dispense: 30 tablet; Refill: 1  Consult with neurology before starting lexapro  RTC in 4 w for assessment  Hydrate well  Rest  Report to ER immediately if symptoms worsen or persist               No follow-ups on file.

## 2023-10-11 NOTE — LETTER
October 11, 2023      [facility]  [address] [place] [address]  Phone: [phone]  Fax: [phone]       Patient: Vesna Richards   YOB: 1965  Date of Visit: 10/11/2023    To Whom It May Concern:    Rocio Richards  was at [facility] on 10/11/2023. The patient may return to work on 10/16/2023 with no restrictions. Please excuse her from 10/04/2023-10/13/2023. If you have any questions or concerns, or if I can be of further assistance, please do not hesitate to contact me.    Sincerely,    Lo Patel LPN

## 2023-10-11 NOTE — PATIENT INSTRUCTIONS
"Consult with neurology before starting lexapro  RTC in 4 w for assessment  Hydrate well  Rest  Report to ER immediately if symptoms worsen or persist    Escitalopram: Patient drug information  Access [employer] for additional drug information, tools, and databases.  Copyright 6397-0005 [employer] All rights reserved.  Contributor Disclosures  (For additional information see "Escitalopram: Drug information" and see "Escitalopram: Pediatric drug information")    You must carefully read the "Consumer Information Use and Disclaimer" below in order to understand and correctly use this information.  Brand Names: [place]  Lexapro    Brand Names: Sofy  ACH-Escitalopram;     ACT Escitalopram ODT;     AG-Escitalopram;     APO-Escitalopram;     Auro-Escitalopram;     BIO-Escitalopram;     Cipralex;     KEHINDE-Escitalopram;     M-Escitalopram;     Mar-Escitalopram;     MINT-Escitalopram;     MYLAN-Escitalopram;     RIGOBERTO-Escitalopram;     NRA-Escitalopram;     PMS-Escitalopram;     PMSC-Escitalopram;     YUN-Escitalopram;     SANDOZ Escitalopram;     TARO-Escitalopram;     TEVA-Escitalopram    Warning  For all patients taking this drug:  Drugs like this one have raised the chance of suicidal thoughts or actions in children and young adults. The risk may be greater in people who have had these thoughts or actions in the past. All people who take this drug need to be watched closely. Call the doctor right away if signs like depression, nervousness, restlessness, grouchiness, panic attacks, or changes in mood or actions are new or worse. Call the doctor right away if any thoughts or actions of suicide occur.  Children:  This drug is not approved for use in all children. Talk with the doctor to be sure that this drug is right for your child.    What is this drug used for?  It is used to treat depression.  It is used to treat anxiety.  It may be given to you for other reasons. Talk with the doctor.    What do I need to tell my " doctor BEFORE I take this drug?  If you are allergic to this drug; any part of this drug; or any other drugs, foods, or substances. Tell your doctor about the allergy and what signs you had.  If you are taking any of these drugs: Linezolid or methylene blue.  If you are taking any of these drugs: Citalopram or pimozide.  If you have taken certain drugs for depression or Parkinson's disease in the last 14 days. This includes isocarboxazid, phenelzine, tranylcypromine, selegiline, or rasagiline. Very high blood pressure may happen.  This is not a list of all drugs or health problems that interact with this drug.  Tell your doctor and pharmacist about all of your drugs (prescription or OTC, natural products, vitamins) and health problems. You must check to make sure that it is safe for you to take this drug with all of your drugs and health problems. Do not start, stop, or change the dose of any drug without checking with your doctor.    What are some things I need to know or do while I take this drug?  Tell all of your health care providers that you take this drug. This includes your doctors, nurses, pharmacists, and dentists.  Avoid driving and doing other tasks or actions that call for you to be alert until you see how this drug affects you.  Do not stop taking this drug all of a sudden without calling your doctor. You may have a greater risk of side effects. If you need to stop this drug, you will want to slowly stop it as ordered by your doctor.  Avoid drinking alcohol while taking this drug.  Talk with your doctor before you use marijuana, other forms of cannabis, or prescription or OTC drugs that may slow your actions.  In depression, sleep and appetite may get better soon after starting this drug. Other depression signs may take up to 4 weeks to get better.  This drug may raise the chance of bleeding. Sometimes, bleeding can be life-threatening. Talk with the doctor.  This drug can cause low sodium levels. Very  low sodium levels can be life-threatening, leading to seizures, passing out, trouble breathing, or death.  If you are 65 or older, use this drug with care. You could have more side effects.  This drug may affect growth in children and teens in some cases. They may need regular growth checks. Talk with the doctor.  Tell your doctor if you are pregnant, may be pregnant, or plan to get pregnant while taking this drug. You will need to talk about the benefits and risks to you and the baby. Taking this drug in the third trimester of pregnancy may raise your risk of bleeding after delivery and may lead to some health problems in the .  Tell your doctor if you are breast-feeding. You will need to talk about any risks to your baby.    What are some side effects that I need to call my doctor about right away?  WARNING/CAUTION: Even though it may be rare, some people may have very bad and sometimes deadly side effects when taking a drug. Tell your doctor or get medical help right away if you have any of the following signs or symptoms that may be related to a very bad side effect:  Signs of an allergic reaction, like rash; hives; itching; red, swollen, blistered, or peeling skin with or without fever; wheezing; tightness in the chest or throat; trouble breathing, swallowing, or talking; unusual hoarseness; or swelling of the mouth, face, lips, tongue, or throat.  Signs of low sodium levels like headache, trouble focusing, memory problems, feeling confused, weakness, seizures, or change in balance.  Signs of bleeding like throwing up or coughing up blood; vomit that looks like coffee grounds; blood in the urine; black, red, or tarry stools; bleeding from the gums; abnormal vaginal bleeding; bruises without a cause or that get bigger; or bleeding you cannot stop.  Seizures.  Fever or chills.  Painful erection (hard penis) or an erection that lasts for longer than 4 hours.  Sex problems have happened with drugs like this  one. This includes lowered interest in sex, trouble having an orgasm, ejaculation problems, or trouble getting or keeping an erection. If you have any questions, talk with your doctor.  Some people may have a higher chance of eye problems with this drug. Your doctor may want you to have an eye exam to see if you have a higher chance of these eye problems. Call your doctor right away if you have eye pain, change in eyesight, or swelling or redness in or around the eye.  A severe and sometimes deadly problem called serotonin syndrome may happen. The risk may be greater if you also take certain other drugs. Call your doctor right away if you have agitation; change in balance; confusion; hallucinations; fever; fast or abnormal heartbeat; flushing; muscle twitching or stiffness; seizures; shivering or shaking; sweating a lot; severe diarrhea, upset stomach, or throwing up; or very bad headache.    What are some other side effects of this drug?  All drugs may cause side effects. However, many people have no side effects or only have minor side effects. Call your doctor or get medical help if any of these side effects or any other side effects bother you or do not go away:  Feeling dizzy, sleepy, tired, or weak.  Upset stomach.  Diarrhea or constipation.  Dry mouth.  Trouble sleeping.  Sweating a lot.  Flu-like signs.  Runny nose.  Headache.  Yawning.  These are not all of the side effects that may occur. If you have questions about side effects, call your doctor. Call your doctor for medical advice about side effects.  You may report side effects to your national health agency.    How is this drug best taken?  Use this drug as ordered by your doctor. Read all information given to you. Follow all instructions closely.  All products:  Take with or without food.  Keep taking this drug as you have been told by your doctor or other health care provider, even if you feel well.  Oral solution:  Measure liquid doses carefully.  Use the measuring device that comes with this drug. If there is none, ask the pharmacist for a device to measure this drug.    What do I do if I miss a dose?  Take a missed dose as soon as you think about it.  If it is close to the time for your next dose, skip the missed dose and go back to your normal time.  Do not take 2 doses at the same time or extra doses.    How do I store and/or throw out this drug?  Store at room temperature in a dry place. Do not store in a bathroom.  Keep all drugs in a safe place. Keep all drugs out of the reach of children and pets.  Throw away unused or  drugs. Do not flush down a toilet or pour down a drain unless you are told to do so. Check with your pharmacist if you have questions about the best way to throw out drugs. There may be drug take-back programs in your area.    General drug facts  If your symptoms or health problems do not get better or if they become worse, call your doctor.  Do not share your drugs with others and do not take anyone else's drugs.  Some drugs may have another patient information leaflet. If you have any questions about this drug, please talk with your doctor, nurse, pharmacist, or other health care provider.  If you think there has been an overdose, call your poison control center or get medical care right away. Be ready to tell or show what was taken, how much, and when it happened.      Michael Malagon,     If you are due for any health screening(s) below please notify me so we can arrange them to be ordered and scheduled. Most healthy patients at your age complete them, but you are free to accept or refuse.     If you can't do it, I'll definitely understand. If you can, I'd certainly appreciate it!    Tests to Keep You Healthy    Mammogram: ORDERED BUT NOT SCHEDULED  Colon Cancer Screening: DUE  Cervical Cancer Screening: DUE      Schedule your breast cancer screening today     Breast cancer is the second most common cancer in women,  and the second  leading cause of death from cancer. Mammograms can detect breast cancer early, which significantly increases the chances of curing the cancer.       Our records indicate that you may be overdue for breast cancer screening. Cancer screenings save lives, so schedule yours today to stay healthy.     If you recently had a mammogram performed outside of [facility], please let your Health care team know so that they can update your health record.        Its time for your colon cancer screening     Colorectal cancer is one of the leading causes of cancer death for men and women but it doesnt have to be. Screenings can prevent colorectal cancer or find it early enough to treat and cure the disease.     Our records indicate that you may be overdue for colon cancer screening. A colonoscopy or stool screening test can help identify patients at risk for developing colon cancer. Cancer screenings save lives, so schedule yours today to stay healthy.     A colonoscopy is the preferred test for detecting colon cancer. It is needed only once every 10 years if results are negative. While you are sedated, a flexible, lighted tube with a tiny camera is inserted into the rectum and advanced through the colon to look for cancers.     An alternative screening test that is used at home and returned to the lab may also be used. It detects hidden blood in bowel movements which could indicate cancer in the colon. If results are positive, you will need a colonoscopy to determine if the blood is a sign of cancer. This type of follow up (diagnostic) colonoscopy usually requires additional copays as required by your insurance provider.     If you recently had your colon cancer screening performed outside of [facility], please let your Health care team know so that they can update your health record. Please contact your PCP if you have any questions.    Your cervical cancer screening is due     Our records indicate that you  may be overdue for your screening Pap smear. A Pap smear is an important health screening that can detect abnormal cells that can become cervical cancer. Cervical cancer screenings allow for early diagnosis and increase the likelihood of successful treatment.     The current recommendation for Pap smear screening is every 3-5 years for women at average risk. We encourage you to schedule your appointment with your [facility] provider. Many women see a gynecologist for this screening, but some primary care providers also provide Pap screening.     If you recently had your Pap smear screening performed outside of [facility], please let your health care team know so that they can update your health record.      Were here to help you quit smoking     Our records indicated that you are still smoking. One of the best things you can do for your health is to stop smoking and we are here to help.     Talk with your provider about our Smoking Cessation Program and how we can support you on your journey.

## 2023-10-13 ENCOUNTER — PATIENT MESSAGE (OUTPATIENT)
Dept: PSYCHIATRY | Facility: CLINIC | Age: 58
End: 2023-10-13
Payer: COMMERCIAL

## 2023-10-17 ENCOUNTER — PATIENT MESSAGE (OUTPATIENT)
Dept: FAMILY MEDICINE | Facility: CLINIC | Age: 58
End: 2023-10-17
Payer: COMMERCIAL

## 2023-10-17 ENCOUNTER — TELEPHONE (OUTPATIENT)
Dept: FAMILY MEDICINE | Facility: CLINIC | Age: 58
End: 2023-10-17
Payer: COMMERCIAL

## 2023-10-17 RX ORDER — HYDROXYZINE PAMOATE 25 MG/1
25 CAPSULE ORAL EVERY 8 HOURS PRN
Qty: 90 CAPSULE | Refills: 3 | Status: SHIPPED | OUTPATIENT
Start: 2023-10-17 | End: 2023-12-07

## 2023-10-26 ENCOUNTER — PATIENT MESSAGE (OUTPATIENT)
Dept: NEUROLOGY | Facility: CLINIC | Age: 58
End: 2023-10-26
Payer: COMMERCIAL

## 2023-10-26 DIAGNOSIS — R56.9 SEIZURES: ICD-10-CM

## 2023-10-26 RX ORDER — LEVETIRACETAM 750 MG/1
750 TABLET ORAL 2 TIMES DAILY
Qty: 180 TABLET | Refills: 3 | Status: SHIPPED | OUTPATIENT
Start: 2023-10-26

## 2023-11-02 PROCEDURE — 95726 PR EEG, W/VIDEO, CONT RECORD, CMPLT STDY, I&R, >84 HRS: ICD-10-PCS | Mod: S$GLB,,, | Performed by: PSYCHIATRY & NEUROLOGY

## 2023-11-02 PROCEDURE — 95726 EEG PHY/QHP>84 HR W/VEEG: CPT | Mod: S$GLB,,, | Performed by: PSYCHIATRY & NEUROLOGY

## 2023-11-13 ENCOUNTER — TELEPHONE (OUTPATIENT)
Dept: NEUROLOGY | Facility: CLINIC | Age: 58
End: 2023-11-13
Payer: COMMERCIAL

## 2023-11-13 NOTE — TELEPHONE ENCOUNTER
----- Message from Yumi Tong MA sent at 11/13/2023  2:22 PM CST -----  Contact: [email or web address]@ [phone]  Pt called                  In regards to speak back with staff for EEG results.              Call back  [phone]

## 2023-11-13 NOTE — TELEPHONE ENCOUNTER
----- Message from Sharri Kelly MD sent at 11/13/2023  2:14 PM CST -----    11- through 11- (interpreted on 11-)    AEEG  117 for hours NL. The six captured events are non-epileptic.    Report was printed for scanning and also added as addendum to the note dated 08- by Sharri Kelly MD

## 2023-11-13 NOTE — PROGRESS NOTES
[facility]  [phone]      Outpatient Video EEG - services by [facility].  Patient:  Vesna Richards          : 1965     Age: 58  Gender: female     Study#: [id number]     Referring Physician: Sharri Kelly M.D.  CLTM: Marybel Carpio  Reading Physician: Sharri Kelly M.D.        Recording start: 2023 11:08 AM  Duration: 4 days, 21 hours, 12 minutes.  Recording stop: 2023 8:20 AM    __________________________________________________________________________________________      TECHNIQUE:    This is a 19 channel digital ambulatory video EEG, recorded using scalp electrodes according to international 10-20 electrode placement system. This video EEG was intermittently monitored for recording integrity and abnormalities that would warrant intervention.    CLINICAL HISTORY:     A 58 year old female presents with events of altered mental status concerning for seizures. Routine EEG was normal. Prolonged study requested to evaluate for epileptiform discharges and seizure activity.    LEVEL OF CONSCIOUSENESS:     Awake and Sleep.     EEG BACKGROUND:     The posterior dominant basic rhythm reaches 10-11 Hz, symmetric, reactive, well-modulated and well-sustained.    EEG CLASSIFICATION:     Normal.    Patient documented five episodes of  loss of time, dizziness and disorientation and one undocumented event was recorded with patient falling and being assisted back in the bed by . It is not clear if patient trips. EEG shows normal awake background activity. No electro-encephalographic correlation is noted, i.e. there are no epileptiform discharges.       IMPRESSION:                This 117 hour in-home video-EEG monitoring study is normal in the awake and sleeps states. Events reported and captured are non-epileptic in nature.    There are no epileptiform discharges or lateralizing signs. No typical events were recorded. There is no electrographic evidence of seizure. There is no  electrographic evidence of status epilepticus.     Sharri Kelly MD, FAAN          2023-11-13 14:06  Diplomate, American Board of Psychiatry and Neurology  Diplomate, American Board of Clinical Neurophysiology   Fellow, American Academy of Neurology

## 2023-11-29 ENCOUNTER — TELEPHONE (OUTPATIENT)
Dept: NEUROLOGY | Facility: CLINIC | Age: 58
End: 2023-11-29
Payer: COMMERCIAL

## 2023-11-29 NOTE — TELEPHONE ENCOUNTER
----- Message from Leslie Zhao sent at 11/29/2023  2:21 PM CST -----  Contact: self  ..Type:  Patient Returning Call    Who Called:.Vesna Richards  Who Left Message for Patient:  Does the patient know what this is regarding?:apt   Would the patient rather a call back or a response via [facility]? Call back   Best Call Back Number:.[phone]   Additional Information: pt states she received an call to reschedule and is needing an sooner apt than what was offered

## 2023-12-07 ENCOUNTER — PATIENT OUTREACH (OUTPATIENT)
Dept: ADMINISTRATIVE | Facility: HOSPITAL | Age: 58
End: 2023-12-07
Payer: COMMERCIAL

## 2023-12-07 ENCOUNTER — PATIENT MESSAGE (OUTPATIENT)
Dept: PSYCHIATRY | Facility: CLINIC | Age: 58
End: 2023-12-07
Payer: COMMERCIAL

## 2023-12-07 ENCOUNTER — OFFICE VISIT (OUTPATIENT)
Dept: NEUROLOGY | Facility: CLINIC | Age: 58
End: 2023-12-07
Payer: COMMERCIAL

## 2023-12-07 ENCOUNTER — LAB VISIT (OUTPATIENT)
Dept: LAB | Facility: HOSPITAL | Age: 58
End: 2023-12-07
Attending: NURSE PRACTITIONER
Payer: COMMERCIAL

## 2023-12-07 VITALS
HEIGHT: 60 IN | HEART RATE: 94 BPM | WEIGHT: 191.81 LBS | BODY MASS INDEX: 37.66 KG/M2 | RESPIRATION RATE: 16 BRPM | DIASTOLIC BLOOD PRESSURE: 76 MMHG | SYSTOLIC BLOOD PRESSURE: 121 MMHG

## 2023-12-07 DIAGNOSIS — Z63.8 STRESS DUE TO FAMILY TENSION: ICD-10-CM

## 2023-12-07 DIAGNOSIS — Z87.820 HISTORY OF MULTIPLE CONCUSSIONS: ICD-10-CM

## 2023-12-07 DIAGNOSIS — F19.10 POLYSUBSTANCE ABUSE: ICD-10-CM

## 2023-12-07 DIAGNOSIS — Z91.410 HISTORY OF PHYSICAL ABUSE IN ADULTHOOD: ICD-10-CM

## 2023-12-07 DIAGNOSIS — F44.5 NONEPILEPTIC ATTACK DISORDER: ICD-10-CM

## 2023-12-07 DIAGNOSIS — F41.9 ANXIETY: ICD-10-CM

## 2023-12-07 DIAGNOSIS — F44.5 NONEPILEPTIC ATTACK DISORDER: Primary | ICD-10-CM

## 2023-12-07 DIAGNOSIS — Z62.810 HISTORY OF PHYSICAL ABUSE IN CHILDHOOD: ICD-10-CM

## 2023-12-07 DIAGNOSIS — G40.919 INTRACTABLE EPILEPSY WITHOUT STATUS EPILEPTICUS, UNSPECIFIED EPILEPSY TYPE: ICD-10-CM

## 2023-12-07 PROCEDURE — 3008F PR BODY MASS INDEX (BMI) DOCUMENTED: ICD-10-PCS | Mod: CPTII,S$GLB,, | Performed by: NURSE PRACTITIONER

## 2023-12-07 PROCEDURE — 3074F PR MOST RECENT SYSTOLIC BLOOD PRESSURE < 130 MM HG: ICD-10-PCS | Mod: CPTII,S$GLB,, | Performed by: NURSE PRACTITIONER

## 2023-12-07 PROCEDURE — 3078F DIAST BP <80 MM HG: CPT | Mod: CPTII,S$GLB,, | Performed by: NURSE PRACTITIONER

## 2023-12-07 PROCEDURE — 99999 PR PBB SHADOW E&M-EST. PATIENT-LVL V: CPT | Mod: PBBFAC,,, | Performed by: NURSE PRACTITIONER

## 2023-12-07 PROCEDURE — 99215 OFFICE O/P EST HI 40 MIN: CPT | Mod: S$GLB,,, | Performed by: NURSE PRACTITIONER

## 2023-12-07 PROCEDURE — 80177 DRUG SCRN QUAN LEVETIRACETAM: CPT | Performed by: NURSE PRACTITIONER

## 2023-12-07 PROCEDURE — 3074F SYST BP LT 130 MM HG: CPT | Mod: CPTII,S$GLB,, | Performed by: NURSE PRACTITIONER

## 2023-12-07 PROCEDURE — 1159F PR MEDICATION LIST DOCUMENTED IN MEDICAL RECORD: ICD-10-PCS | Mod: CPTII,S$GLB,, | Performed by: NURSE PRACTITIONER

## 2023-12-07 PROCEDURE — 1159F MED LIST DOCD IN RCRD: CPT | Mod: CPTII,S$GLB,, | Performed by: NURSE PRACTITIONER

## 2023-12-07 PROCEDURE — 3008F BODY MASS INDEX DOCD: CPT | Mod: CPTII,S$GLB,, | Performed by: NURSE PRACTITIONER

## 2023-12-07 PROCEDURE — 99999 PR PBB SHADOW E&M-EST. PATIENT-LVL V: ICD-10-PCS | Mod: PBBFAC,,, | Performed by: NURSE PRACTITIONER

## 2023-12-07 PROCEDURE — 36415 COLL VENOUS BLD VENIPUNCTURE: CPT | Performed by: NURSE PRACTITIONER

## 2023-12-07 PROCEDURE — 3078F PR MOST RECENT DIASTOLIC BLOOD PRESSURE < 80 MM HG: ICD-10-PCS | Mod: CPTII,S$GLB,, | Performed by: NURSE PRACTITIONER

## 2023-12-07 PROCEDURE — 99215 PR OFFICE/OUTPT VISIT, EST, LEVL V, 40-54 MIN: ICD-10-PCS | Mod: S$GLB,,, | Performed by: NURSE PRACTITIONER

## 2023-12-07 RX ORDER — HYDROXYZINE PAMOATE 50 MG/1
50 CAPSULE ORAL 3 TIMES DAILY PRN
Qty: 90 CAPSULE | Refills: 11 | Status: SHIPPED | OUTPATIENT
Start: 2023-12-07 | End: 2024-03-25

## 2023-12-07 RX ORDER — LAMOTRIGINE 100 MG/1
100 TABLET ORAL DAILY
Qty: 30 TABLET | Refills: 11 | Status: SHIPPED | OUTPATIENT
Start: 2023-12-28 | End: 2024-01-25 | Stop reason: SDUPTHER

## 2023-12-07 RX ORDER — LAMOTRIGINE 25 MG/1
TABLET ORAL
Qty: 42 TABLET | Refills: 0 | Status: SHIPPED | OUTPATIENT
Start: 2023-12-07 | End: 2023-12-28

## 2023-12-07 SDOH — SOCIAL DETERMINANTS OF HEALTH (SDOH): OTHER SPECIFIED PROBLEMS RELATED TO PRIMARY SUPPORT GROUP: Z63.8

## 2023-12-07 NOTE — PROGRESS NOTES
Updates were requested from care everywhere.  Health Maintenance has been updated.  [place] immunization registry triggered.  Immunizations were reconciled.  Per [facility] in basket message, pt declined flu vaccine 12/07/2023.
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
## 2023-12-11 LAB — LEVETIRACETAM SERPL-MCNC: 30 UG/ML (ref 3–60)

## 2023-12-15 ENCOUNTER — PATIENT MESSAGE (OUTPATIENT)
Dept: FAMILY MEDICINE | Facility: CLINIC | Age: 58
End: 2023-12-15
Payer: COMMERCIAL

## 2023-12-18 NOTE — TELEPHONE ENCOUNTER
Spoke with patient about this. Will have Chani fill out when she comes back. Patient states she an wait.

## 2024-01-03 ENCOUNTER — PATIENT MESSAGE (OUTPATIENT)
Dept: FAMILY MEDICINE | Facility: CLINIC | Age: 59
End: 2024-01-03
Payer: COMMERCIAL

## 2024-01-10 ENCOUNTER — OFFICE VISIT (OUTPATIENT)
Dept: FAMILY MEDICINE | Facility: CLINIC | Age: 59
End: 2024-01-10
Payer: COMMERCIAL

## 2024-01-10 VITALS
DIASTOLIC BLOOD PRESSURE: 66 MMHG | HEART RATE: 80 BPM | BODY MASS INDEX: 36.78 KG/M2 | SYSTOLIC BLOOD PRESSURE: 127 MMHG | WEIGHT: 199.88 LBS | TEMPERATURE: 98 F | HEIGHT: 62 IN

## 2024-01-10 DIAGNOSIS — G40.919 INTRACTABLE EPILEPSY WITHOUT STATUS EPILEPTICUS, UNSPECIFIED EPILEPSY TYPE: Primary | ICD-10-CM

## 2024-01-10 DIAGNOSIS — Z91.410 HISTORY OF PHYSICAL ABUSE IN ADULTHOOD: ICD-10-CM

## 2024-01-10 DIAGNOSIS — Z87.820 HISTORY OF MULTIPLE CONCUSSIONS: ICD-10-CM

## 2024-01-10 DIAGNOSIS — F41.9 ANXIETY AND DEPRESSION: ICD-10-CM

## 2024-01-10 DIAGNOSIS — M25.512 CHRONIC LEFT SHOULDER PAIN: ICD-10-CM

## 2024-01-10 DIAGNOSIS — F32.A ANXIETY AND DEPRESSION: ICD-10-CM

## 2024-01-10 DIAGNOSIS — G89.29 CHRONIC LEFT SHOULDER PAIN: ICD-10-CM

## 2024-01-10 DIAGNOSIS — Z62.810 HISTORY OF PHYSICAL ABUSE IN CHILDHOOD: ICD-10-CM

## 2024-01-10 DIAGNOSIS — F44.5 NONEPILEPTIC ATTACK DISORDER: ICD-10-CM

## 2024-01-10 PROCEDURE — 99214 OFFICE O/P EST MOD 30 MIN: CPT | Mod: S$GLB,,, | Performed by: FAMILY MEDICINE

## 2024-01-10 PROCEDURE — 99999 PR PBB SHADOW E&M-EST. PATIENT-LVL IV: CPT | Mod: PBBFAC,,, | Performed by: FAMILY MEDICINE
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
## 2024-01-25 DIAGNOSIS — F19.10 POLYSUBSTANCE ABUSE: ICD-10-CM

## 2024-01-25 DIAGNOSIS — F44.5 NONEPILEPTIC ATTACK DISORDER: ICD-10-CM

## 2024-01-25 DIAGNOSIS — F41.9 ANXIETY: ICD-10-CM

## 2024-01-25 RX ORDER — LAMOTRIGINE 100 MG/1
100 TABLET ORAL DAILY
Qty: 30 TABLET | Refills: 11 | Status: SHIPPED | OUTPATIENT
Start: 2024-01-25 | End: 2024-03-07 | Stop reason: SDUPTHER

## 2024-03-07 DIAGNOSIS — F19.10 POLYSUBSTANCE ABUSE: ICD-10-CM

## 2024-03-07 DIAGNOSIS — F44.5 NONEPILEPTIC ATTACK DISORDER: ICD-10-CM

## 2024-03-07 DIAGNOSIS — F41.9 ANXIETY: ICD-10-CM

## 2024-03-08 RX ORDER — LAMOTRIGINE 100 MG/1
100 TABLET ORAL DAILY
Qty: 30 TABLET | Refills: 11 | Status: SHIPPED | OUTPATIENT
Start: 2024-03-08 | End: 2024-03-25 | Stop reason: SDUPTHER

## 2024-03-08 NOTE — TELEPHONE ENCOUNTER
LOV 12/07/2023   Protocol For Photochemotherapy For Severe Photoresponsive Dermatoses: Tar And Broad Band Uvb (Goeckerman Treatment): The patient received Photochemotherapy for severe photoresponsive dermatoses: Tar and Broad Band UVB (Goeckerman treatment) requiring at least 4 to 8 hours of care under direct physician supervision.

## 2024-03-25 ENCOUNTER — OFFICE VISIT (OUTPATIENT)
Dept: FAMILY MEDICINE | Facility: CLINIC | Age: 59
End: 2024-03-25
Payer: COMMERCIAL

## 2024-03-25 VITALS
TEMPERATURE: 98 F | SYSTOLIC BLOOD PRESSURE: 116 MMHG | DIASTOLIC BLOOD PRESSURE: 83 MMHG | RESPIRATION RATE: 18 BRPM | HEART RATE: 91 BPM | BODY MASS INDEX: 35.13 KG/M2 | HEIGHT: 62 IN | WEIGHT: 190.88 LBS | OXYGEN SATURATION: 96 %

## 2024-03-25 DIAGNOSIS — F41.9 ANXIETY: ICD-10-CM

## 2024-03-25 DIAGNOSIS — F44.5 NONEPILEPTIC ATTACK DISORDER: ICD-10-CM

## 2024-03-25 DIAGNOSIS — S60.511A INFECTED ABRASION OF RIGHT HAND, INITIAL ENCOUNTER: Primary | ICD-10-CM

## 2024-03-25 DIAGNOSIS — R56.9 SEIZURE-LIKE ACTIVITY: ICD-10-CM

## 2024-03-25 DIAGNOSIS — Z12.11 COLON CANCER SCREENING: ICD-10-CM

## 2024-03-25 DIAGNOSIS — L08.9 INFECTED ABRASION OF RIGHT HAND, INITIAL ENCOUNTER: Primary | ICD-10-CM

## 2024-03-25 PROBLEM — F19.10 POLYSUBSTANCE ABUSE: Status: RESOLVED | Noted: 2023-08-14 | Resolved: 2024-03-25

## 2024-03-25 PROCEDURE — 99999 PR PBB SHADOW E&M-EST. PATIENT-LVL IV: CPT | Mod: PBBFAC,,, | Performed by: STUDENT IN AN ORGANIZED HEALTH CARE EDUCATION/TRAINING PROGRAM

## 2024-03-25 PROCEDURE — 99214 OFFICE O/P EST MOD 30 MIN: CPT | Mod: S$GLB,,, | Performed by: STUDENT IN AN ORGANIZED HEALTH CARE EDUCATION/TRAINING PROGRAM

## 2024-03-25 RX ORDER — MUPIROCIN 20 MG/G
OINTMENT TOPICAL 2 TIMES DAILY
Qty: 30 G | Refills: 1 | Status: SHIPPED | OUTPATIENT
Start: 2024-03-25 | End: 2024-04-04

## 2024-03-25 RX ORDER — CLINDAMYCIN HYDROCHLORIDE 300 MG/1
300 CAPSULE ORAL EVERY 8 HOURS
Qty: 21 CAPSULE | Refills: 0 | Status: SHIPPED | OUTPATIENT
Start: 2024-03-25 | End: 2024-03-27

## 2024-03-25 RX ORDER — LAMOTRIGINE 100 MG/1
100 TABLET ORAL DAILY
Qty: 90 TABLET | Refills: 3 | Status: SHIPPED | OUTPATIENT
Start: 2024-03-25 | End: 2024-06-17

## 2024-03-25 RX ORDER — HYDROXYZINE PAMOATE 50 MG/1
50 CAPSULE ORAL 2 TIMES DAILY
Start: 2024-03-25 | End: 2025-03-20

## 2024-03-25 RX ORDER — SULFAMETHOXAZOLE AND TRIMETHOPRIM 800; 160 MG/1; MG/1
1 TABLET ORAL 2 TIMES DAILY
Qty: 14 TABLET | Refills: 0 | Status: SHIPPED | OUTPATIENT
Start: 2024-03-25 | End: 2024-04-01

## 2024-03-25 NOTE — PROGRESS NOTES
Problem List Items Addressed This Visit          Neuro    Seizure-like activity    Overview     Chronic; intermittent hx  Admitted for sz like activity in early June '23. Reports buspar and paroxetine interaction caused sz, meds since dc'd.  Last sz ?march 2024.   Cont routine follow up neurology  Cont current meds               Psychiatric    Anxiety    Overview     Chronic history; hydroxyzine BID  Denies SI/HI; no hallucinations     Assoc panic attacks  Stable on current meds   Annual f/u            Relevant Medications    lamoTRIgine (LAMICTAL) 100 MG tablet    hydrOXYzine pamoate (VISTARIL) 50 MG Cap       Orthopedic    Infected abrasion of right hand - Primary    Overview     New problem, scrarched dorsum of r hand on dog cage  Soaked in iodine solution in office  Bactroban and po bactrim  Prn rtc           Other Visit Diagnoses       Nonepileptic attack disorder        Relevant Medications    lamoTRIgine (LAMICTAL) 100 MG tablet    Colon cancer screening        Relevant Orders    Cologuard Screening (Multitarget Stool DNA) (Completed)                Follow up in about 6 weeks (around 5/6/2024) for pap in 6 weeks, mmg soon.    Emily Dumont MD  _________________________________________________________________________      Patient ID: Vesna Richards is a 58 y.o. female.    Chief Complaint:  fall in setting of sz    Reports fall Friday 3/22/24 in the kitchen, felt presyncopal. Sat down and chair rolled. She fell onto the ground hitting her head/nose. Reports LOC for about 1min.  was home. Did not witness episode. Didn't seek medical attn. Feels this was a sz related to taking advil with Lamictal. Also hiy dorsum of r hand on dog kennel, developed infection. Denies fever/chills.     Now with r hand, low back, and L shoulder pain. Pt declines XR at this time.     Past medical histories reviewed, including past medical, surgical, family and social histories.      Current Outpatient Medications on File  Prior to Visit   Medication Sig Dispense Refill    atorvastatin (LIPITOR) 40 MG tablet Take 1 tablet (40 mg total) by mouth once daily. 90 tablet 3    levETIRAcetam (KEPPRA) 750 MG Tab Take 1 tablet (750 mg total) by mouth 2 (two) times daily. 180 tablet 3    vitamin D (VITAMIN D3) 1000 units Tab Take 5,000 Units by mouth once daily.       No current facility-administered medications on file prior to visit.       Review of Systems   12 point review of systems negative except for listed in HPI.     Objective:    Nursing note and vitals reviewed.  Vitals:    03/25/24 1015   BP: 116/83   Pulse: 91   Resp: 18   Temp: 98.2 °F (36.8 °C)     Body mass index is 34.92 kg/m².     Physical Exam   Constitutional: oriented to person, place, and time. well-developed and well-nourished. No distress.   HENT: WNL  Head: Normocephalic and atraumatic.   Eyes: EOM are normal.   Neck: Normal range of motion. Neck supple.   Cardiovascular: Normal rate  Pulmonary/Chest: Effort normal. No respiratory distress.   GI: soft, non distended, no ttp, no rebound/guarding  Musculoskeletal: Normal range of motion. no edema.   Neurological: CN II-XII intact  Skin: warm and dry. R hand dorsum with shallow ulcerations with granulation. Surrounding erythema and ttp. Full rom and strength, nv intact R hand   Psychiatric: normal mood and affect. behavior is normal.           We Offer Telehealth & Same Day Appointments!   Book your Telehealth appointment with me through my nurse or   Clinic appointments on [facility]!  [phone]     To Schedule appointments online, go to [facility]: [email or web address]         Answers submitted by the patient for this visit:  Back Pain Questionnaire (Submitted on 3/25/2024)  Chief Complaint: Back pain  Chronicity: recurrent  Onset: 1 to 4 weeks ago  Frequency: daily  Progression since onset: unchanged  Pain location: sacro-iliac  Pain quality: cramping  Radiates to: does not radiate  Pain -  numeric: 5/10  Pain is: worse during the night  Aggravated by: lying down  Stiffness is present: at night  Pain severity: moderate  Improvement on treatment: no relief

## 2024-03-25 NOTE — PATIENT INSTRUCTIONS
Michael Malagon,     If you are due for any health screening(s) below please notify me so we can arrange them to be ordered and scheduled. Most healthy patients at your age complete them, but you are free to accept or refuse.     If you can't do it, I'll definitely understand. If you can, I'd certainly appreciate it!    Tests to Keep You Healthy    Mammogram: ORDERED BUT NOT SCHEDULED  Colon Cancer Screening: DUE  Cervical Cancer Screening: DUE  Tobacco Cessation: NO      Schedule your breast cancer screening today     Breast cancer is the second most common cancer in women,  and the second leading cause of death from cancer. Mammograms can detect breast cancer early, which significantly increases the chances of curing the cancer.       Our records indicate that you may be overdue for breast cancer screening. Cancer screenings save lives, so schedule yours today to stay healthy.     If you recently had a mammogram performed outside of [facility], please let your Health care team know so that they can update your health record.        Its time for your colon cancer screening     Colorectal cancer is one of the leading causes of cancer death for men and women but it doesnt have to be. Screenings can prevent colorectal cancer or find it early enough to treat and cure the disease.     Our records indicate that you may be overdue for colon cancer screening. A colonoscopy or stool screening test can help identify patients at risk for developing colon cancer. Cancer screenings save lives, so schedule yours today to stay healthy.     A colonoscopy is the preferred test for detecting colon cancer. It is needed only once every 10 years if results are negative. While you are sedated, a flexible, lighted tube with a tiny camera is inserted into the rectum and advanced through the colon to look for cancers.     An alternative screening test that is used at home and returned to the lab may also be used. It detects hidden  blood in bowel movements which could indicate cancer in the colon. If results are positive, you will need a colonoscopy to determine if the blood is a sign of cancer. This type of follow up (diagnostic) colonoscopy usually requires additional copays as required by your insurance provider.     If you recently had your colon cancer screening performed outside of [facility], please let your Health care team know so that they can update your health record. Please contact your PCP if you have any questions.    Your cervical cancer screening is due     Our records indicate that you may be overdue for your screening Pap smear. A Pap smear is an important health screening that can detect abnormal cells that can become cervical cancer. Cervical cancer screenings allow for early diagnosis and increase the likelihood of successful treatment.     The current recommendation for Pap smear screening is every 3-5 years for women at average risk. We encourage you to schedule your appointment with your [facility] provider. Many women see a gynecologist for this screening, but some primary care providers also provide Pap screening.     If you recently had your Pap smear screening performed outside of [facility], please let your health care team know so that they can update your health record.      Were here to help you quit smoking     Our records indicated that you are still smoking. One of the best things you can do for your health is to stop smoking and we are here to help.     Talk with your provider about our Smoking Cessation Program and how we can support you on your journey.

## 2024-03-27 ENCOUNTER — LAB VISIT (OUTPATIENT)
Dept: LAB | Facility: HOSPITAL | Age: 59
End: 2024-03-27
Attending: NURSE PRACTITIONER
Payer: COMMERCIAL

## 2024-03-27 ENCOUNTER — OFFICE VISIT (OUTPATIENT)
Dept: NEUROLOGY | Facility: CLINIC | Age: 59
End: 2024-03-27
Payer: COMMERCIAL

## 2024-03-27 VITALS
WEIGHT: 193.56 LBS | DIASTOLIC BLOOD PRESSURE: 74 MMHG | BODY MASS INDEX: 35.4 KG/M2 | SYSTOLIC BLOOD PRESSURE: 119 MMHG | HEART RATE: 88 BPM

## 2024-03-27 DIAGNOSIS — Z72.0 TOBACCO USE: ICD-10-CM

## 2024-03-27 DIAGNOSIS — Z91.410 HISTORY OF PHYSICAL ABUSE IN ADULTHOOD: ICD-10-CM

## 2024-03-27 DIAGNOSIS — G40.919 INTRACTABLE EPILEPSY WITHOUT STATUS EPILEPTICUS, UNSPECIFIED EPILEPSY TYPE: Primary | ICD-10-CM

## 2024-03-27 DIAGNOSIS — G40.919 INTRACTABLE EPILEPSY WITHOUT STATUS EPILEPTICUS, UNSPECIFIED EPILEPSY TYPE: ICD-10-CM

## 2024-03-27 DIAGNOSIS — Z63.8 STRESS DUE TO FAMILY TENSION: ICD-10-CM

## 2024-03-27 DIAGNOSIS — F44.5 NONEPILEPTIC ATTACK DISORDER: ICD-10-CM

## 2024-03-27 DIAGNOSIS — F19.10 POLYSUBSTANCE ABUSE: ICD-10-CM

## 2024-03-27 DIAGNOSIS — Z62.810 HISTORY OF PHYSICAL ABUSE IN CHILDHOOD: ICD-10-CM

## 2024-03-27 DIAGNOSIS — R56.9 SEIZURE-LIKE ACTIVITY: ICD-10-CM

## 2024-03-27 DIAGNOSIS — R20.0 NUMBNESS IN FEET: ICD-10-CM

## 2024-03-27 DIAGNOSIS — F41.9 ANXIETY: ICD-10-CM

## 2024-03-27 DIAGNOSIS — Z87.820 HISTORY OF MULTIPLE CONCUSSIONS: ICD-10-CM

## 2024-03-27 PROCEDURE — 36415 COLL VENOUS BLD VENIPUNCTURE: CPT | Performed by: NURSE PRACTITIONER

## 2024-03-27 PROCEDURE — 99999 PR PBB SHADOW E&M-EST. PATIENT-LVL IV: CPT | Mod: PBBFAC,,, | Performed by: NURSE PRACTITIONER

## 2024-03-27 PROCEDURE — 80177 DRUG SCRN QUAN LEVETIRACETAM: CPT | Performed by: NURSE PRACTITIONER

## 2024-03-27 PROCEDURE — 99214 OFFICE O/P EST MOD 30 MIN: CPT | Mod: S$GLB,,, | Performed by: NURSE PRACTITIONER

## 2024-03-27 PROCEDURE — 80175 DRUG SCREEN QUAN LAMOTRIGINE: CPT | Performed by: NURSE PRACTITIONER

## 2024-03-27 SDOH — SOCIAL DETERMINANTS OF HEALTH (SDOH): OTHER SPECIFIED PROBLEMS RELATED TO PRIMARY SUPPORT GROUP: Z63.8

## 2024-03-27 NOTE — PATIENT INSTRUCTIONS
AVOID any substance that could lower seizure threshold including but not limited to:        ALCOHOL AND WITHDRAWAL      TRAMADOL.     MEPERIDINE (DEMEROL)     ALL STIMULANTS-ALL ADHD MEDICATIONS.      CLOZAPINE.      BUPROPION (WELLBUTRIN)     CIPROFLOXACIN.    CYCLOSPORINE.     METOCLOPRAMIDE (REGLAN).     TETRAHYDROCANNABINOL (THC)    NIDIA
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
## 2024-04-01 LAB
LAMOTRIGINE SERPL-MCNC: 2.8 UG/ML (ref 2–15)
LEVETIRACETAM SERPL-MCNC: 24.3 UG/ML (ref 3–60)

## 2024-04-02 LAB — NONINV COLON CA DNA+OCC BLD SCRN STL QL: NORMAL

## 2024-04-02 NOTE — PROGRESS NOTES
I have sent a msg to patient with the following interpretation (see below):      Cologuard Sample Could Not Be Processed. [facility] will contact you to repeat    Please do not hesitate to call or message with any questions or concerns    Emily Dumont MD

## 2024-04-04 DIAGNOSIS — M25.512 LEFT SHOULDER PAIN, UNSPECIFIED CHRONICITY: Primary | ICD-10-CM

## 2024-04-08 ENCOUNTER — HOSPITAL ENCOUNTER (OUTPATIENT)
Dept: RADIOLOGY | Facility: HOSPITAL | Age: 59
Discharge: HOME | End: 2024-04-08
Attending: STUDENT IN AN ORGANIZED HEALTH CARE EDUCATION/TRAINING PROGRAM
Payer: COMMERCIAL

## 2024-04-08 ENCOUNTER — PATIENT MESSAGE (OUTPATIENT)
Dept: SPORTS MEDICINE | Facility: CLINIC | Age: 59
End: 2024-04-08
Payer: COMMERCIAL

## 2024-04-08 ENCOUNTER — PATIENT MESSAGE (OUTPATIENT)
Dept: NEUROLOGY | Facility: CLINIC | Age: 59
End: 2024-04-08
Payer: COMMERCIAL

## 2024-04-08 ENCOUNTER — PATIENT MESSAGE (OUTPATIENT)
Dept: NEUROSURGERY | Facility: CLINIC | Age: 59
End: 2024-04-08
Payer: COMMERCIAL

## 2024-04-08 ENCOUNTER — OFFICE VISIT (OUTPATIENT)
Dept: ORTHOPEDICS | Facility: CLINIC | Age: 59
End: 2024-04-08
Payer: COMMERCIAL

## 2024-04-08 DIAGNOSIS — M75.52 SUBACROMIAL BURSITIS OF LEFT SHOULDER JOINT: Primary | ICD-10-CM

## 2024-04-08 DIAGNOSIS — M25.512 LEFT SHOULDER PAIN, UNSPECIFIED CHRONICITY: Primary | ICD-10-CM

## 2024-04-08 DIAGNOSIS — M25.512 CHRONIC LEFT SHOULDER PAIN: ICD-10-CM

## 2024-04-08 DIAGNOSIS — G89.29 CHRONIC LEFT SHOULDER PAIN: ICD-10-CM

## 2024-04-08 DIAGNOSIS — M19.019 OSTEOARTHRITIS OF ACROMIOCLAVICULAR JOINT: ICD-10-CM

## 2024-04-08 DIAGNOSIS — M67.912 TENDINOPATHY OF LEFT ROTATOR CUFF: ICD-10-CM

## 2024-04-08 DIAGNOSIS — M25.512 LEFT SHOULDER PAIN, UNSPECIFIED CHRONICITY: ICD-10-CM

## 2024-04-08 DIAGNOSIS — M75.102 TEAR OF LEFT ROTATOR CUFF, UNSPECIFIED TEAR EXTENT, UNSPECIFIED WHETHER TRAUMATIC: ICD-10-CM

## 2024-04-08 PROCEDURE — 73030 X-RAY EXAM OF SHOULDER: CPT | Mod: TC,PO,LT

## 2024-04-08 PROCEDURE — 73030 X-RAY EXAM OF SHOULDER: CPT | Mod: 26,LT,, | Performed by: RADIOLOGY

## 2024-04-08 PROCEDURE — G2211 COMPLEX E/M VISIT ADD ON: HCPCS | Mod: S$GLB,,, | Performed by: STUDENT IN AN ORGANIZED HEALTH CARE EDUCATION/TRAINING PROGRAM

## 2024-04-08 PROCEDURE — 99999 PR PBB SHADOW E&M-EST. PATIENT-LVL III: CPT | Mod: PBBFAC,,, | Performed by: STUDENT IN AN ORGANIZED HEALTH CARE EDUCATION/TRAINING PROGRAM

## 2024-04-08 PROCEDURE — 99204 OFFICE O/P NEW MOD 45 MIN: CPT | Mod: S$GLB,,, | Performed by: STUDENT IN AN ORGANIZED HEALTH CARE EDUCATION/TRAINING PROGRAM

## 2024-04-08 NOTE — PROGRESS NOTES
Patient ID: Vesna Richards  YOB: 1965  MRN: [id number]    Chief Complaint: Pain of the Left Shoulder      Referred By: David Thakkar MD    History of Present Illness: Vesna Richards is a right-hand dominant 58 y.o. female who presents today with left shoulder pain.   She reports 1 of the seizures this summer she may have aggravated her shoulder and she has pain with attempted overhead movement on the left shoulder. No pain just sitting, only some tingling. She reports tylenol and ibuprofen reacted with seizure medication (caused seizure). She has not tried any other conservative treatment. Per patient, If any medication is prescribed, it will need to be ok'd with her Neurologist.    The patient is active in none.  Occupation: none      Past Medical History:   Past Medical History:   Diagnosis Date    Intractable epilepsy without status epilepticus 01/10/2024    Seizures      Past Surgical History:   Procedure Laterality Date     SECTION       History reviewed. No pertinent family history.  Social History     Socioeconomic History    Marital status:    Tobacco Use    Smoking status: Every Day     Current packs/day: 1.50     Average packs/day: 2.2 packs/day for 82.3 years (178.9 ttl pk-yrs)     Types: Cigarettes     Start date:    Substance and Sexual Activity    Alcohol use: Not Currently    Drug use: Never    Sexual activity: Yes     Partners: Male     Social Determinants of Health     Financial Resource Strain: Low Risk  (1/10/2024)    Overall Financial Resource Strain (CARDIA)     Difficulty of Paying Living Expenses: Not very hard   Food Insecurity: Food Insecurity Present (1/10/2024)    Hunger Vital Sign     Worried About Running Out of Food in the Last Year: Sometimes true     Ran Out of Food in the Last Year: Never true   Transportation Needs: Unmet Transportation Needs (1/10/2024)    PRAPARE - Transportation     Lack of Transportation (Medical): Yes     Lack of  Transportation (Non-Medical): Yes   Physical Activity: Inactive (1/10/2024)    Exercise Vital Sign     Days of Exercise per Week: 0 days     Minutes of Exercise per Session: 0 min   Stress: Stress Concern Present (1/10/2024)    [place] of [employer] - Occupational Stress Questionnaire     Feeling of Stress : Rather much   Social Connections: Socially Isolated (1/10/2024)    Social Connection and Isolation Panel [NHANES]     Frequency of Communication with Friends and Family: Once a week     Frequency of Social Gatherings with Friends and Family: Once a week     Attends Orthodoxy Services: Never     Active Member of Clubs or Organizations: No     Attends Club or Organization Meetings: Never     Marital Status:    Housing Stability: Low Risk  (1/10/2024)    Housing Stability Vital Sign     Unable to Pay for Housing in the Last Year: No     Number of Places Lived in the Last Year: 1     Unstable Housing in the Last Year: No     Medication List with Changes/Refills   Current Medications    ATORVASTATIN (LIPITOR) 40 MG TABLET    Take 1 tablet (40 mg total) by mouth once daily.    HYDROXYZINE PAMOATE (VISTARIL) 50 MG CAP    Take 1 capsule (50 mg total) by mouth 2 (two) times a day.    LAMOTRIGINE (LAMICTAL) 100 MG TABLET    Take 1 tablet (100 mg total) by mouth once daily.    LEVETIRACETAM (KEPPRA) 750 MG TAB    Take 1 tablet (750 mg total) by mouth 2 (two) times daily.    VITAMIN D (VITAMIN D3) 1000 UNITS TAB    Take 5,000 Units by mouth once daily.     Review of patient's allergies indicates:   Allergen Reactions    Penicillin g Anaphylaxis    Penicillins Shortness Of Breath and Nausea And Vomiting       Physical Exam:   There is no height or weight on file to calculate BMI.    GENERAL: Well appearing, in no acute distress.  HEAD: Normocephalic and atraumatic.  ENT: External ears and nose grossly normal.  EYES: EOMI bilaterally  PULMONARY: Respirations are grossly even and non-labored.  NEURO:  Awake, alert, and oriented x 3.  SKIN: No obvious rashes appreciated.  PSYCH: Mood & affect are appropriate.    Detailed MSK exam:     Left shoulder exam:   -ROM: abduction 80, forward flexion 80, external rotation 60, internal rotation 60  -empty can test guarded, resisted ER guarded, belly press guarded  -ellington test guarded, neers test guarded, whipple test guarded  -biceps load test negative, yerguson test negative, [place]'s test guarded  -sensation intact, pulses 2+  -TTP: AC joint, lateral cuff insertion, and posterior        Imaging:  X-Ray Shoulder 2 or More Views Left  Narrative: EXAMINATION:  XR SHOULDER COMPLETE 2 OR MORE VIEWS LEFT    CLINICAL HISTORY:  Pain in left shoulder    TECHNIQUE:  Two or three views of the left shoulder were preformed.    COMPARISON:  None    FINDINGS:  No acute fracture or dislocation.  Minimal AC joint arthropathy noted.  No significant degenerative change at the glenohumeral joint.  Impression: 1.  As above    Electronically signed by: Manny Woods DO  Date:    04/08/2024  Time:    13:01        Relevant imaging results were reviewed and interpreted by me and per my read shows mild AC arthritic changes.  This was discussed with the patient and / or family today.     Assessment:  Vesna Richards is a 58 y.o. female presenting with left shoulder pain.   History, physical and radiographs are consistent with a likely diagnosis of AC OA, RC tendinopathy vs tear, SA bursitis.   Plan: MRI ordered. Consider PT and/or steroid injection if not surgical. Patient will find out from neurology if she can do a steroid injection. Consider shoulder referral if surgical. Continue conservative management for pain.   Follow up after MRI to go over results and determine next steps in management. All questions answered.      Subacromial bursitis of left shoulder joint    Chronic left shoulder pain  -     Ambulatory referral/consult to Orthopedics    Osteoarthritis of acromioclavicular  joint    Tendinopathy of left rotator cuff         Today's visit is associated with current or anticipated ongoing medical care related to this patient's diagnosis of osteoarthritis.  Currently there is no cure of osteoarthritis and the patient will require regular follow up to manage symptoms and progression.  The patient is to return to the office within a minimum of 3-6 months to review symptoms and function at that time.   CPT code      A copy of today's visit note has been sent to the referring provider.     Electronically signed:  Jovanny La MD, MPH  04/08/2024  2:09 PM

## 2024-04-19 DIAGNOSIS — E78.2 MIXED HYPERLIPIDEMIA: ICD-10-CM

## 2024-04-19 RX ORDER — ATORVASTATIN CALCIUM 40 MG/1
40 TABLET, FILM COATED ORAL
Qty: 90 TABLET | Refills: 3 | Status: SHIPPED | OUTPATIENT
Start: 2024-04-19

## 2024-04-19 NOTE — TELEPHONE ENCOUNTER
Care Due:                  Date            Visit Type   Department     Provider  --------------------------------------------------------------------------------                                MYCHART                              FOLLOWUP/OF  [facility]  Last Visit: 03-      FICE VISIT   MEDICINE       Emily Dumont                               -                              PRIMARY      [facility]  Next Visit: 05-      CARE ([facility])   MEDICINE       Emily Dumont                                                            Last  Test          Frequency    Reason                     Performed    Due Date  --------------------------------------------------------------------------------    CMP.........  12 months..  atorvastatin.............  06- 05-    Lipid Panel.  12 months..  atorvastatin.............  Not Found    Overdue    [employer] Embedded Care Due Messages. Reference number: [id number].   4/19/2024 12:05:56 AM CDT

## 2024-04-19 NOTE — TELEPHONE ENCOUNTER
Refill Routing Note   Medication(s) are not appropriate for processing by [facility] for the following reason(s):        Required labs outdated    [facility] action(s):  Defer   Requires labs : Yes             Appointments  past 12m or future 3m with PCP    Date Provider   Last Visit   3/25/2024 Emily Dumont MD   Next Visit   5/6/2024 Emily Dumont MD   ED visits in past 90 days: 0        Note composed:8:58 AM 04/19/2024

## 2024-04-22 ENCOUNTER — OFFICE VISIT (OUTPATIENT)
Dept: ORTHOPEDICS | Facility: CLINIC | Age: 59
End: 2024-04-22
Payer: COMMERCIAL

## 2024-04-22 ENCOUNTER — TELEPHONE (OUTPATIENT)
Dept: ORTHOPEDICS | Facility: CLINIC | Age: 59
End: 2024-04-22
Payer: COMMERCIAL

## 2024-04-22 VITALS — HEIGHT: 62 IN | BODY MASS INDEX: 35.62 KG/M2 | WEIGHT: 193.56 LBS

## 2024-04-22 DIAGNOSIS — M19.019 OSTEOARTHRITIS OF ACROMIOCLAVICULAR JOINT: ICD-10-CM

## 2024-04-22 DIAGNOSIS — M75.52 SUBACROMIAL BURSITIS OF LEFT SHOULDER JOINT: ICD-10-CM

## 2024-04-22 DIAGNOSIS — M67.912 TENDINOPATHY OF LEFT ROTATOR CUFF: Primary | ICD-10-CM

## 2024-04-22 PROCEDURE — 99999 PR PBB SHADOW E&M-EST. PATIENT-LVL IV: CPT | Mod: PBBFAC,,, | Performed by: STUDENT IN AN ORGANIZED HEALTH CARE EDUCATION/TRAINING PROGRAM

## 2024-04-22 PROCEDURE — 20611 DRAIN/INJ JOINT/BURSA W/US: CPT | Mod: LT,S$GLB,, | Performed by: STUDENT IN AN ORGANIZED HEALTH CARE EDUCATION/TRAINING PROGRAM

## 2024-04-22 PROCEDURE — 99213 OFFICE O/P EST LOW 20 MIN: CPT | Mod: 25,S$GLB,, | Performed by: STUDENT IN AN ORGANIZED HEALTH CARE EDUCATION/TRAINING PROGRAM

## 2024-04-22 RX ORDER — TRIAMCINOLONE ACETONIDE 40 MG/ML
40 INJECTION, SUSPENSION INTRA-ARTICULAR; INTRAMUSCULAR
Status: DISCONTINUED | OUTPATIENT
Start: 2024-04-22 | End: 2024-04-22 | Stop reason: HOSPADM

## 2024-04-22 RX ADMIN — TRIAMCINOLONE ACETONIDE 40 MG: 40 INJECTION, SUSPENSION INTRA-ARTICULAR; INTRAMUSCULAR at 11:04

## 2024-04-22 NOTE — PROGRESS NOTES
Patient ID: Vesna Richards  YOB: 1965  MRN: [id number]    Chief Complaint: Pain of the Left Shoulder      History of Present Illness: Vesna Richards is a right-hand dominant 58 y.o. female who presents today with left shoulder pain 8/10. Patient LOV 24. She presents for steroid injections today approved per pt neurologist. She c/o constant numbness pain with movement, lifting makes pain worse. She has tried NSAID'S and OTC Tylenol. Patient did not do MRI d/t costs.           Past Medical History:   Past Medical History:   Diagnosis Date    Intractable epilepsy without status epilepticus 01/10/2024    Seizures      Past Surgical History:   Procedure Laterality Date     SECTION       No family history on file.  Social History     Socioeconomic History    Marital status:    Tobacco Use    Smoking status: Every Day     Current packs/day: 1.50     Average packs/day: 2.2 packs/day for 82.3 years (179.0 ttl pk-yrs)     Types: Cigarettes     Start date:    Substance and Sexual Activity    Alcohol use: Not Currently    Drug use: Never    Sexual activity: Yes     Partners: Male     Social Determinants of Health     Financial Resource Strain: Low Risk  (1/10/2024)    Overall Financial Resource Strain (CARDIA)     Difficulty of Paying Living Expenses: Not very hard   Food Insecurity: Food Insecurity Present (1/10/2024)    Hunger Vital Sign     Worried About Running Out of Food in the Last Year: Sometimes true     Ran Out of Food in the Last Year: Never true   Transportation Needs: Unmet Transportation Needs (1/10/2024)    PRAPARE - Transportation     Lack of Transportation (Medical): Yes     Lack of Transportation (Non-Medical): Yes   Physical Activity: Inactive (1/10/2024)    Exercise Vital Sign     Days of Exercise per Week: 0 days     Minutes of Exercise per Session: 0 min   Stress: Stress Concern Present (1/10/2024)    [place] of Occupational Health - Occupational  "Stress Questionnaire     Feeling of Stress : Rather much   Social Connections: Socially Isolated (1/10/2024)    Social Connection and Isolation Panel [NHANES]     Frequency of Communication with Friends and Family: Once a week     Frequency of Social Gatherings with Friends and Family: Once a week     Attends Jain Services: Never     Active Member of Clubs or Organizations: No     Attends Club or Organization Meetings: Never     Marital Status:    Housing Stability: Low Risk  (1/10/2024)    Housing Stability Vital Sign     Unable to Pay for Housing in the Last Year: No     Number of Places Lived in the Last Year: 1     Unstable Housing in the Last Year: No     Medication List with Changes/Refills   Current Medications    ATORVASTATIN (LIPITOR) 40 MG TABLET    TAKE 1 TABLET BY MOUTH EVERY DAY    HYDROXYZINE PAMOATE (VISTARIL) 50 MG CAP    Take 1 capsule (50 mg total) by mouth 2 (two) times a day.    LAMOTRIGINE (LAMICTAL) 100 MG TABLET    Take 1 tablet (100 mg total) by mouth once daily.    LEVETIRACETAM (KEPPRA) 750 MG TAB    Take 1 tablet (750 mg total) by mouth 2 (two) times daily.    MULTIVITAMIN WITH MINERALS TABLET    Take 1 tablet by mouth once daily.    VITAMIN D (VITAMIN D3) 1000 UNITS TAB    Take 5,000 Units by mouth once daily.     Review of patient's allergies indicates:   Allergen Reactions    Clindamycin Other (See Comments)     Pt state gives her the "grandma"seizure.    Penicillin g Anaphylaxis    Penicillins Shortness Of Breath and Nausea And Vomiting       Physical Exam:   Body mass index is 35.4 kg/m².    GENERAL: Well appearing, in no acute distress.  HEAD: Normocephalic and atraumatic.  ENT: External ears and nose grossly normal.  EYES: EOMI bilaterally  PULMONARY: Respirations are grossly even and non-labored.  NEURO: Awake, alert, and oriented x 3.  SKIN: No obvious rashes appreciated.  PSYCH: Mood & affect are appropriate.    Detailed MSK exam:     Left shoulder exam:   -ROM: " abduction 80, forward flexion 80, external rotation 60, internal rotation 60  -empty can test guarded, resisted ER guarded, belly press guarded  -ellington test guarded, neers test guarded, whipple test guarded  -biceps load test negative, yerguson test negative, [place]'s test guarded  -sensation intact, pulses 2+  -TTP: AC joint, lateral cuff insertion, and posterior    Imaging:  X-Ray Shoulder 2 or More Views Left  Narrative: EXAMINATION:  XR SHOULDER COMPLETE 2 OR MORE VIEWS LEFT    CLINICAL HISTORY:  Pain in left shoulder    TECHNIQUE:  Two or three views of the left shoulder were preformed.    COMPARISON:  None    FINDINGS:  No acute fracture or dislocation.  Minimal AC joint arthropathy noted.  No significant degenerative change at the glenohumeral joint.  Impression: 1.  As above    Electronically signed by: Manny Woods DO  Date:    04/08/2024  Time:    13:01        Relevant imaging results were reviewed and interpreted by me and per my read shows mild AC arthritic changes.  This was discussed with the patient and / or family today.     Assessment:  Vesna Richards is a 58 y.o. female following up for left shoulder pain. Unable to do MRI d/t costs. Interested in steroid injection and PT referral today.   Plan: Steroid injection given today (see separate procedure note for details). We discussed the proper protocols after the injection such as no submerging pools, baths tubs, or hot tubs for 24 hr.  Showering is okay today.  We also discussed that blood sugars can be elevated after an injection and asked patient to properly checked her sugars over the next few days and contact their PCP if there are any concerns.  We discussed red flags such as fevers, chills, red, warm, tender joint at the area of injection to please seek medical care immediately.   PT referral. Continue conservative management for pain.   Follow up 3 months. All questions answered.     Tendinopathy of left rotator cuff  -     Ambulatory  referral/consult to Physical/Occupational Therapy; Future; Expected date: 04/29/2024  -     Sports Medicine US - Guidance for Needle Placement  -     Large Joint Aspiration/Injection: L subacromial bursa    Osteoarthritis of acromioclavicular joint  -     Ambulatory referral/consult to Physical/Occupational Therapy; Future; Expected date: 04/29/2024  -     Sports Medicine US - Guidance for Needle Placement  -     Large Joint Aspiration/Injection: L subacromial bursa    Subacromial bursitis of left shoulder joint  -     Ambulatory referral/consult to Physical/Occupational Therapy; Future; Expected date: 04/29/2024  -     Sports Medicine US - Guidance for Needle Placement  -     Large Joint Aspiration/Injection: L subacromial bursa         Ultrasound guidance was used for needle localization. Images were saved and stored for documentation. The appropriate structures were visualized. Dynamic visualization of the needle was continuous throughout the procedures and maintained good position.      Electronically signed:  Jovanny La MD, MPH  04/22/2024  11:35 AM

## 2024-04-22 NOTE — PROCEDURES
Sports Medicine US - Guidance for Needle Placement    Date/Time: 4/22/2024 11:40 AM    Performed by: Jovanny La MD  Authorized by: Jovanny La MD  Preparation: Patient was prepped and draped in the usual sterile fashion.  Local anesthesia used: no    Anesthesia:  Local anesthesia used: no    Sedation:  Patient sedated: no    Patient tolerance: patient tolerated the procedure well with no immediate complications  Comments: Ultrasound guidance was used for needle localization. Images were saved and stored for documentation. The appropriate structures were visualized. Dynamic visualization of the needle was continuous throughout the procedures and maintained good position.

## 2024-04-22 NOTE — PROCEDURES
Large Joint Aspiration/Injection: L subacromial bursa    Date/Time: 4/22/2024 11:40 AM    Performed by: Jovanny La MD  Authorized by: Jovanny La MD    Consent Done?:  Yes (Verbal)  Indications:  Pain  Site marked: the procedure site was marked    Timeout: prior to procedure the correct patient, procedure, and site was verified    Prep: patient was prepped and draped in usual sterile fashion    Local anesthetic:  Bupivacaine 0.5% without epinephrine and lidocaine 1% without epinephrine    Details:  Needle Size:  21 G  Ultrasonic Guidance for needle placement?: Yes    Images are saved and documented.  Approach:  Lateral  Location:  Shoulder  Site:  L subacromial bursa  Medications:  40 mg triamcinolone acetonide 40 mg/mL  Patient tolerance:  Patient tolerated the procedure well with no immediate complications     Ultrasound guidance was used for needle localization. Images were saved and stored for documentation. The appropriate structures were visualized. Dynamic visualization of the needle was continuous throughout the procedures and maintained good position.

## 2024-04-22 NOTE — TELEPHONE ENCOUNTER
Contacted patient regarding appt today as patient did not receive MRI.  Patient stated that MRI co pay was too expensive and would like to try conservative route of CSI.  Patient has checked with neurologist to make sure this was safe to receive.

## 2024-04-22 NOTE — PROGRESS NOTES
Patient ID: Vesna Richards  YOB: 1965  MRN: [id number]    Chief Complaint: Pain of the Left Shoulder      History of Present Illness: Vesna Richards is a right-hand dominant 58 y.o. female who presents today with left shoulder pain 8/10. Patient LOV 24. She presents for steroid injections today approved per pt neurologist. She c/o constant numbness pain with movement, lifting makes pain worse. She has tried NSAID'S and OTC Tylenol.     The patient is active in {sports:82929}.  Occupation: ***      Past Medical History:   Past Medical History:   Diagnosis Date    Intractable epilepsy without status epilepticus 01/10/2024    Seizures      Past Surgical History:   Procedure Laterality Date     SECTION       No family history on file.  Social History     Socioeconomic History    Marital status:    Tobacco Use    Smoking status: Every Day     Current packs/day: 1.50     Average packs/day: 2.2 packs/day for 82.3 years (179.0 ttl pk-yrs)     Types: Cigarettes     Start date:    Substance and Sexual Activity    Alcohol use: Not Currently    Drug use: Never    Sexual activity: Yes     Partners: Male     Social Determinants of Health     Financial Resource Strain: Low Risk  (1/10/2024)    Overall Financial Resource Strain (CARDIA)     Difficulty of Paying Living Expenses: Not very hard   Food Insecurity: Food Insecurity Present (1/10/2024)    Hunger Vital Sign     Worried About Running Out of Food in the Last Year: Sometimes true     Ran Out of Food in the Last Year: Never true   Transportation Needs: Unmet Transportation Needs (1/10/2024)    PRAPARE - Transportation     Lack of Transportation (Medical): Yes     Lack of Transportation (Non-Medical): Yes   Physical Activity: Inactive (1/10/2024)    Exercise Vital Sign     Days of Exercise per Week: 0 days     Minutes of Exercise per Session: 0 min   Stress: Stress Concern Present (1/10/2024)    [place] of Occupational  "Health - Occupational Stress Questionnaire     Feeling of Stress : Rather much   Social Connections: Socially Isolated (1/10/2024)    Social Connection and Isolation Panel [NHANES]     Frequency of Communication with Friends and Family: Once a week     Frequency of Social Gatherings with Friends and Family: Once a week     Attends Church Services: Never     Active Member of Clubs or Organizations: No     Attends Club or Organization Meetings: Never     Marital Status:    Housing Stability: Low Risk  (1/10/2024)    Housing Stability Vital Sign     Unable to Pay for Housing in the Last Year: No     Number of Places Lived in the Last Year: 1     Unstable Housing in the Last Year: No     Medication List with Changes/Refills   Current Medications    ATORVASTATIN (LIPITOR) 40 MG TABLET    TAKE 1 TABLET BY MOUTH EVERY DAY    HYDROXYZINE PAMOATE (VISTARIL) 50 MG CAP    Take 1 capsule (50 mg total) by mouth 2 (two) times a day.    LAMOTRIGINE (LAMICTAL) 100 MG TABLET    Take 1 tablet (100 mg total) by mouth once daily.    LEVETIRACETAM (KEPPRA) 750 MG TAB    Take 1 tablet (750 mg total) by mouth 2 (two) times daily.    MULTIVITAMIN WITH MINERALS TABLET    Take 1 tablet by mouth once daily.    VITAMIN D (VITAMIN D3) 1000 UNITS TAB    Take 5,000 Units by mouth once daily.     Review of patient's allergies indicates:   Allergen Reactions    Clindamycin Other (See Comments)     Pt state gives her the "grandma"seizure.    Penicillin g Anaphylaxis    Penicillins Shortness Of Breath and Nausea And Vomiting       Physical Exam:   Body mass index is 35.4 kg/m².    GENERAL: Well appearing, in no acute distress.  HEAD: Normocephalic and atraumatic.  ENT: External ears and nose grossly normal.  EYES: EOMI bilaterally  PULMONARY: Respirations are grossly even and non-labored.  NEURO: Awake, alert, and oriented x 3.  SKIN: No obvious rashes appreciated.  PSYCH: Mood & affect are appropriate.    Detailed MSK exam: " "    ***    Imaging:  X-Ray Shoulder 2 or More Views Left  Narrative: EXAMINATION:  XR SHOULDER COMPLETE 2 OR MORE VIEWS LEFT    CLINICAL HISTORY:  Pain in left shoulder    TECHNIQUE:  Two or three views of the left shoulder were preformed.    COMPARISON:  None    FINDINGS:  No acute fracture or dislocation.  Minimal AC joint arthropathy noted.  No significant degenerative change at the glenohumeral joint.  Impression: 1.  As above    Electronically signed by: Manny Woods DO  Date:    04/08/2024  Time:    13:01        Relevant imaging results were reviewed and interpreted by me and per my read shows ***.  This was discussed with the patient and / or family today.     Assessment:  Vesna Richards is a 58 y.o. female {newvsfollowup:03275::"presenting with"} ***  Plan: ***  Follow up {followup:00386::"as needed"}. All questions answered.     There are no diagnoses linked to this encounter.     {footnotes:02646}    Electronically signed:  Jovanny La MD, MPH  04/22/2024  11:35 AM  "

## 2024-04-22 NOTE — PATIENT INSTRUCTIONS
Assessment:  Vesna Richards is a 58 y.o. female   Chief Complaint   Patient presents with    Left Shoulder - Pain       No diagnosis found.     Plan:  Ultrasound guided cortisone injection to the left shoulder  We discussed the proper protocols after the injection such as no submerging pools, baths tubs, or hot tubs for 24 hr.  Showering is okay today.  We also discussed that blood sugars can be elevated after an injection and asked patient to properly checked her sugars over the next few days and contact their PCP if there are any concerns.  We discussed red flags such as fevers, chills, red, warm, tender joint at the area of injection to please seek medical care immediately.    Apply topical diclofenac (Voltaren) up to 4 times a day to the affected area.  It can be bought over the counter at any local pharmacy.    Patient may use over the counter lidocaine patches as needed for pain.  Patient may use ice and heat as needed for pain every 2 hours for 15 minutes  Referral to physical therapy at [facility]  Follow up in 3 months          Follow-up: 3 months or sooner if there are problems between now and then.    Thank you for choosing [facility] and Dr. Jovanny La for your orthopedic & sports medicine care. It is our goal to provide you with exceptional care that will help keep you healthy, active, and get you back in the game.    Please do not hesitate to reach out to us via email, phone, or MyChart with any questions, concerns, or feedback.    If you felt that you received exemplary care today, please consider leaving us feedback on [employer] at:  [email or web address]    If you are experiencing pain/discomfort ,or have questions after 5pm and would like to be connected to the [facility] on-call team, please call this number and specify which Sports Medicine provider is treating you: [phone]  [phone]

## 2024-04-24 ENCOUNTER — TELEPHONE (OUTPATIENT)
Dept: ORTHOPEDICS | Facility: CLINIC | Age: 59
End: 2024-04-24
Payer: COMMERCIAL

## 2024-04-30 ENCOUNTER — TELEPHONE (OUTPATIENT)
Dept: ORTHOPEDICS | Facility: CLINIC | Age: 59
End: 2024-04-30
Payer: COMMERCIAL

## 2024-04-30 NOTE — TELEPHONE ENCOUNTER
Faxed and received email confirmation of receipt for PT Initial Exam to [facility]     Detail Level: Generalized Detail Level: Zone

## 2024-05-06 ENCOUNTER — OFFICE VISIT (OUTPATIENT)
Dept: FAMILY MEDICINE | Facility: CLINIC | Age: 59
End: 2024-05-06
Payer: COMMERCIAL

## 2024-05-06 ENCOUNTER — HOSPITAL ENCOUNTER (OUTPATIENT)
Dept: RADIOLOGY | Facility: HOSPITAL | Age: 59
Discharge: HOME | End: 2024-05-06
Attending: STUDENT IN AN ORGANIZED HEALTH CARE EDUCATION/TRAINING PROGRAM
Payer: COMMERCIAL

## 2024-05-06 VITALS
BODY MASS INDEX: 34.85 KG/M2 | OXYGEN SATURATION: 98 % | RESPIRATION RATE: 18 BRPM | HEART RATE: 71 BPM | SYSTOLIC BLOOD PRESSURE: 116 MMHG | HEIGHT: 62 IN | WEIGHT: 189.38 LBS | DIASTOLIC BLOOD PRESSURE: 75 MMHG | TEMPERATURE: 98 F

## 2024-05-06 DIAGNOSIS — E78.2 MIXED HYPERLIPIDEMIA: ICD-10-CM

## 2024-05-06 DIAGNOSIS — Z12.31 ENCOUNTER FOR SCREENING MAMMOGRAM FOR MALIGNANT NEOPLASM OF BREAST: ICD-10-CM

## 2024-05-06 DIAGNOSIS — R56.9 SEIZURE-LIKE ACTIVITY: ICD-10-CM

## 2024-05-06 DIAGNOSIS — Z00.00 ANNUAL PHYSICAL EXAM: Primary | ICD-10-CM

## 2024-05-06 DIAGNOSIS — Z11.59 NEED FOR HEPATITIS C SCREENING TEST: ICD-10-CM

## 2024-05-06 DIAGNOSIS — Z11.4 SCREENING FOR HIV (HUMAN IMMUNODEFICIENCY VIRUS): ICD-10-CM

## 2024-05-06 DIAGNOSIS — Z23 IMMUNIZATION DUE: ICD-10-CM

## 2024-05-06 DIAGNOSIS — Z72.0 TOBACCO USE: ICD-10-CM

## 2024-05-06 DIAGNOSIS — F41.9 ANXIETY: ICD-10-CM

## 2024-05-06 DIAGNOSIS — R73.09 ELEVATED GLUCOSE: ICD-10-CM

## 2024-05-06 PROBLEM — R55 SYNCOPE: Status: RESOLVED | Noted: 2017-10-17 | Resolved: 2024-05-06

## 2024-05-06 PROBLEM — R23.3 EASY BRUISABILITY: Status: RESOLVED | Noted: 2023-08-14 | Resolved: 2024-05-06

## 2024-05-06 PROCEDURE — 77063 BREAST TOMOSYNTHESIS BI: CPT | Mod: 26,,, | Performed by: RADIOLOGY

## 2024-05-06 PROCEDURE — 90715 TDAP VACCINE 7 YRS/> IM: CPT | Mod: S$GLB,,, | Performed by: STUDENT IN AN ORGANIZED HEALTH CARE EDUCATION/TRAINING PROGRAM

## 2024-05-06 PROCEDURE — 90471 IMMUNIZATION ADMIN: CPT | Mod: S$GLB,,, | Performed by: STUDENT IN AN ORGANIZED HEALTH CARE EDUCATION/TRAINING PROGRAM

## 2024-05-06 PROCEDURE — 99999 PR PBB SHADOW E&M-EST. PATIENT-LVL IV: CPT | Mod: PBBFAC,,, | Performed by: STUDENT IN AN ORGANIZED HEALTH CARE EDUCATION/TRAINING PROGRAM

## 2024-05-06 PROCEDURE — 77067 SCR MAMMO BI INCL CAD: CPT | Mod: TC,PO

## 2024-05-06 PROCEDURE — 99396 PREV VISIT EST AGE 40-64: CPT | Mod: 25,S$GLB,, | Performed by: STUDENT IN AN ORGANIZED HEALTH CARE EDUCATION/TRAINING PROGRAM

## 2024-05-06 PROCEDURE — 77067 SCR MAMMO BI INCL CAD: CPT | Mod: 26,,, | Performed by: RADIOLOGY

## 2024-05-06 NOTE — PROGRESS NOTES
Assessment/Plan:    ANNUAL EXAM   patient here for annual exam.    - Labs ordered. Health maintenance was reviewed and ordered. Medications were reviewed and reconciled.  - All questions were answered. Advised of Wellness plan.   - Follow up in 1 year for ANNUAL WELLNESS EXAM      Problem List Items Addressed This Visit          Neuro    Seizure-like activity    Overview     Chronic; intermittent hx  Admitted for sz like activity in early June '23. Reports buspar and paroxetine interaction caused sz, meds since dc'd.  Last sz ?march 2024.   Cont routine follow up neurology  Cont current meds               Psychiatric    Anxiety    Overview     Chronic history; hydroxyzine BID and lamictal  Denies SI/HI; no hallucinations   Has upcoming psych appt    Assoc panic attacks  Stable on current meds   Annual f/u               Cardiac/Vascular    Hyperlipidemia    Overview     -chronic condition. Currently stable.    - lipids ordered   Hyperlipidemia Medications       atorvastatin (LIPITOR) 40 MG tablet Take 1 tablet (40 mg total) by mouth once daily.             Relevant Orders    Microalbumin/Creatinine Ratio, Urine    Comprehensive Metabolic Panel    CBC Auto Differential       Other    Tobacco use    Overview     Assistance with smoking cessation was offered, including:  [x]  Medications  [x]  Counseling  []  Printed Information on Smoking Cessation  [x]  Referral to a Smoking Cessation Program    Patient was counseled regarding smoking for 3-10 minutes.  Smoking about 1ppd, about 44 pack year smoking hx  Reports she is working to cont cutting down         Relevant Orders    CT Chest Lung Screening Low Dose    Annual physical exam - Primary    Overview     Complete history and physical was completed today.    Complete and thorough medication reconciliation was performed. Discussed risks and benefits of medications.    Advised patient on orders and health maintenance.    Continue current medications listed on your  summary sheet.    All questions were answered.   Follow up as planned or prn             Other Visit Diagnoses       Elevated glucose        Relevant Orders    Hemoglobin A1C    Need for hepatitis C screening test        Relevant Orders    Hepatitis C Antibody    Screening for HIV (human immunodeficiency virus)        Relevant Orders    HIV 1/2 Ag/Ab (4th Gen)    Immunization due        Relevant Medications    DIPH,PERTUSS(ACEL),TET VAC(PF)(ADULT)(ADACEL)(TDaP) (Completed)                Follow up for 1st avail pap with me. all my labs on wed am .    Emily Dumont MD  _________________________________________________________________________      Patient ID: Vesna Richards is a 58 y.o. female.    Chief Complaint:  Encounter for annual exam    HPI: Patient here for a comprehensive physical exam.    Reports she has been feeling well. Dog scratched dorsum R hand with shallow abrasion. Previous abrasion on dog kennel has since healed. No sz since .     Otherwise, patient has been feeling well. No additional concerns. Denies nausea, vomiting, fevers, chills, abdominal pain, fatigue.     Health Maintenance Topics with due status: Not Due       Topic Last Completion Date    TETANUS VACCINE 2024        ==============================================  History reviewed.   Health Maintenance Due   Topic Date Due    Hepatitis C Screening  Never done    Cervical Cancer Screening  Never done    Lipid Panel  Never done    HIV Screening  Never done    Hemoglobin A1c (Diabetic Prevention Screening)  Never done    Colorectal Cancer Screening  Never done    LDCT Lung Screen  Never done    Mammogram  2018    COVID-19 Vaccine ( season) Never done       Past Medical History:  Past Medical History:   Diagnosis Date    Intractable epilepsy without status epilepticus 01/10/2024    Seizures      Past Surgical History:   Procedure Laterality Date    BREAST BIOPSY       SECTION       Review of patient's  "allergies indicates:   Allergen Reactions    Clindamycin Other (See Comments)     Pt state gives her the "grandma"seizure.    Penicillin g Anaphylaxis    Penicillins Shortness Of Breath and Nausea And Vomiting     Current Outpatient Medications on File Prior to Visit   Medication Sig Dispense Refill    atorvastatin (LIPITOR) 40 MG tablet TAKE 1 TABLET BY MOUTH EVERY DAY 90 tablet 3    hydrOXYzine pamoate (VISTARIL) 50 MG Cap Take 1 capsule (50 mg total) by mouth 2 (two) times a day.      lamoTRIgine (LAMICTAL) 100 MG tablet Take 1 tablet (100 mg total) by mouth once daily. 90 tablet 3    levETIRAcetam (KEPPRA) 750 MG Tab Take 1 tablet (750 mg total) by mouth 2 (two) times daily. 180 tablet 3    multivitamin with minerals tablet Take 1 tablet by mouth once daily.      vitamin D (VITAMIN D3) 1000 units Tab Take 5,000 Units by mouth once daily.       No current facility-administered medications on file prior to visit.     Social History     Socioeconomic History    Marital status:    Tobacco Use    Smoking status: Every Day     Current packs/day: 1.50     Average packs/day: 2.2 packs/day for 82.3 years (179.0 ttl pk-yrs)     Types: Cigarettes     Start date: 1979   Substance and Sexual Activity    Alcohol use: Not Currently    Drug use: Never    Sexual activity: Yes     Partners: Male     Social Determinants of Health     Financial Resource Strain: Low Risk  (1/10/2024)    Overall Financial Resource Strain (CARDIA)     Difficulty of Paying Living Expenses: Not very hard   Food Insecurity: Food Insecurity Present (1/10/2024)    Hunger Vital Sign     Worried About Running Out of Food in the Last Year: Sometimes true     Ran Out of Food in the Last Year: Never true   Transportation Needs: Unmet Transportation Needs (1/10/2024)    PRAPARE - Transportation     Lack of Transportation (Medical): Yes     Lack of Transportation (Non-Medical): Yes   Physical Activity: Inactive (1/10/2024)    Exercise Vital Sign     Days " of Exercise per Week: 0 days     Minutes of Exercise per Session: 0 min   Stress: Stress Concern Present (1/10/2024)    [employer] - Occupational Stress Questionnaire     Feeling of Stress : Rather much   Housing Stability: Low Risk  (1/10/2024)    Housing Stability Vital Sign     Unable to Pay for Housing in the Last Year: No     Number of Places Lived in the Last Year: 1     Unstable Housing in the Last Year: No     Family History   Problem Relation Name Age of Onset    Ovarian cancer Sister      Ovarian cancer Sister                 Objective:    Nursing note and vitals reviewed.  Vitals:    05/06/24 0926   BP: 116/75   Pulse: 71   Resp: 18   Temp: 98.1 °F (36.7 °C)     Body mass index is 34.64 kg/m².     Physical Exam   Constitutional: oriented to person, place, and time. well-developed and well-nourished. No distress.   HENT: WNL  Head: Normocephalic and atraumatic.   Eyes: Pupils are equal, round, and reactive to light. EOM are normal.   Neck: Normal range of motion. Neck supple.   Cardiovascular: Normal rate, regular rhythm, normal heart sounds and intact distal pulses. No murmur heard.  Pulmonary/Chest: Effort normal and breath sounds normal. No respiratory distress. no wheezes.   GI: soft, no ttp, non-distended   Musculoskeletal: Normal range of motion. no edema.   Neurological: CN II-XII intact  Skin: warm and dry. Capillary refill takes less than 2 seconds. dorsum R hand with shallow abrasion 1cm  Psychiatric: normal mood and affect. behavior is normal.           We Offer Telehealth & Same Day Appointments!   Book your Telehealth appointment with me through my nurse or   Clinic appointments on [facility]!  [phone]     To Schedule appointments online, go to [facility]: [email or web address]

## 2024-05-06 NOTE — PATIENT INSTRUCTIONS
Michael Malagon,     If you are due for any health screening(s) below please notify me so we can arrange them to be ordered and scheduled. Most healthy patients at your age complete them, but you are free to accept or refuse.     If you can't do it, I'll definitely understand. If you can, I'd certainly appreciate it!    Tests to Keep You Healthy    Mammogram: SCHEDULED  Colon Cancer Screening: DUE  Cervical Cancer Screening: DUE  Tobacco Cessation: NO      Schedule your breast cancer screening today     Breast cancer is the second most common cancer in women,  and the second leading cause of death from cancer. Mammograms can detect breast cancer early, which significantly increases the chances of curing the cancer.       Our records indicate that you may be overdue for breast cancer screening. Cancer screenings save lives, so schedule yours today to stay healthy.     If you recently had a mammogram performed outside of [facility], please let your Health care team know so that they can update your health record.        Its time for your colon cancer screening     Colorectal cancer is one of the leading causes of cancer death for men and women but it doesnt have to be. Screenings can prevent colorectal cancer or find it early enough to treat and cure the disease.     Our records indicate that you may be overdue for colon cancer screening. A colonoscopy or stool screening test can help identify patients at risk for developing colon cancer. Cancer screenings save lives, so schedule yours today to stay healthy.     A colonoscopy is the preferred test for detecting colon cancer. It is needed only once every 10 years if results are negative. While you are sedated, a flexible, lighted tube with a tiny camera is inserted into the rectum and advanced through the colon to look for cancers.     An alternative screening test that is used at home and returned to the lab may also be used. It detects hidden blood in bowel  movements which could indicate cancer in the colon. If results are positive, you will need a colonoscopy to determine if the blood is a sign of cancer. This type of follow up (diagnostic) colonoscopy usually requires additional copays as required by your insurance provider.     If you recently had your colon cancer screening performed outside of [facility], please let your Health care team know so that they can update your health record. Please contact your PCP if you have any questions.    Your cervical cancer screening is due     Our records indicate that you may be overdue for your screening Pap smear. A Pap smear is an important health screening that can detect abnormal cells that can become cervical cancer. Cervical cancer screenings allow for early diagnosis and increase the likelihood of successful treatment.     The current recommendation for Pap smear screening is every 3-5 years for women at average risk. We encourage you to schedule your appointment with your [facility] health provider. Many women see a gynecologist for this screening, but some primary care providers also provide Pap screening.     If you recently had your Pap smear screening performed outside of [facility], please let your health care team know so that they can update your health record.      Were here to help you quit smoking     Our records indicated that you are still smoking. One of the best things you can do for your health is to stop smoking and we are here to help.     Talk with your provider about our Smoking Cessation Program and how we can support you on your journey.

## 2024-05-09 ENCOUNTER — LAB VISIT (OUTPATIENT)
Dept: LAB | Facility: HOSPITAL | Age: 59
End: 2024-05-09
Attending: STUDENT IN AN ORGANIZED HEALTH CARE EDUCATION/TRAINING PROGRAM
Payer: COMMERCIAL

## 2024-05-09 DIAGNOSIS — Z11.59 NEED FOR HEPATITIS C SCREENING TEST: ICD-10-CM

## 2024-05-09 DIAGNOSIS — E78.2 MIXED HYPERLIPIDEMIA: ICD-10-CM

## 2024-05-09 DIAGNOSIS — R73.09 ELEVATED GLUCOSE: ICD-10-CM

## 2024-05-09 DIAGNOSIS — Z11.4 SCREENING FOR HIV (HUMAN IMMUNODEFICIENCY VIRUS): ICD-10-CM

## 2024-05-09 LAB
ALBUMIN SERPL BCP-MCNC: 3.4 G/DL (ref 3.5–5.2)
ALBUMIN/CREAT UR: NORMAL UG/MG (ref 0–30)
ALP SERPL-CCNC: 116 U/L (ref 55–135)
ALT SERPL W/O P-5'-P-CCNC: 28 U/L (ref 10–44)
ANION GAP SERPL CALC-SCNC: 8 MMOL/L (ref 8–16)
AST SERPL-CCNC: 21 U/L (ref 10–40)
BASOPHILS # BLD AUTO: 0.05 K/UL (ref 0–0.2)
BASOPHILS NFR BLD: 0.5 % (ref 0–1.9)
BILIRUB SERPL-MCNC: 0.6 MG/DL (ref 0.1–1)
BUN SERPL-MCNC: 13 MG/DL (ref 6–20)
CALCIUM SERPL-MCNC: 9.9 MG/DL (ref 8.7–10.5)
CHLORIDE SERPL-SCNC: 104 MMOL/L (ref 95–110)
CO2 SERPL-SCNC: 27 MMOL/L (ref 23–29)
CREAT SERPL-MCNC: 0.9 MG/DL (ref 0.5–1.4)
CREAT UR-MCNC: 89 MG/DL (ref 15–325)
DIFFERENTIAL METHOD BLD: NORMAL
EOSINOPHIL # BLD AUTO: 0.1 K/UL (ref 0–0.5)
EOSINOPHIL NFR BLD: 1.2 % (ref 0–8)
ERYTHROCYTE [DISTWIDTH] IN BLOOD BY AUTOMATED COUNT: 14.5 % (ref 11.5–14.5)
EST. GFR  (NO RACE VARIABLE): >60 ML/MIN/1.73 M^2
ESTIMATED AVG GLUCOSE: 117 MG/DL (ref 68–131)
GLUCOSE SERPL-MCNC: 90 MG/DL (ref 70–110)
HBA1C MFR BLD: 5.7 % (ref 4–5.6)
HCT VFR BLD AUTO: 46.4 % (ref 37–48.5)
HCV AB SERPL QL IA: NORMAL
HGB BLD-MCNC: 15.3 G/DL (ref 12–16)
HIV 1+2 AB+HIV1 P24 AG SERPL QL IA: NORMAL
IMM GRANULOCYTES # BLD AUTO: 0.01 K/UL (ref 0–0.04)
IMM GRANULOCYTES NFR BLD AUTO: 0.1 % (ref 0–0.5)
LYMPHOCYTES # BLD AUTO: 2.9 K/UL (ref 1–4.8)
LYMPHOCYTES NFR BLD: 30.5 % (ref 18–48)
MCH RBC QN AUTO: 30.3 PG (ref 27–31)
MCHC RBC AUTO-ENTMCNC: 33 G/DL (ref 32–36)
MCV RBC AUTO: 92 FL (ref 82–98)
MICROALBUMIN UR DL<=1MG/L-MCNC: <5 UG/ML
MONOCYTES # BLD AUTO: 0.6 K/UL (ref 0.3–1)
MONOCYTES NFR BLD: 6.5 % (ref 4–15)
NEUTROPHILS # BLD AUTO: 5.9 K/UL (ref 1.8–7.7)
NEUTROPHILS NFR BLD: 61.2 % (ref 38–73)
NRBC BLD-RTO: 0 /100 WBC
PLATELET # BLD AUTO: 277 K/UL (ref 150–450)
PMV BLD AUTO: 10.9 FL (ref 9.2–12.9)
POTASSIUM SERPL-SCNC: 3.8 MMOL/L (ref 3.5–5.1)
PROT SERPL-MCNC: 6.6 G/DL (ref 6–8.4)
RBC # BLD AUTO: 5.05 M/UL (ref 4–5.4)
SODIUM SERPL-SCNC: 139 MMOL/L (ref 136–145)
WBC # BLD AUTO: 9.64 K/UL (ref 3.9–12.7)

## 2024-05-09 PROCEDURE — 83036 HEMOGLOBIN GLYCOSYLATED A1C: CPT | Performed by: STUDENT IN AN ORGANIZED HEALTH CARE EDUCATION/TRAINING PROGRAM

## 2024-05-09 PROCEDURE — 36415 COLL VENOUS BLD VENIPUNCTURE: CPT | Mod: PO | Performed by: STUDENT IN AN ORGANIZED HEALTH CARE EDUCATION/TRAINING PROGRAM

## 2024-05-09 PROCEDURE — 80053 COMPREHEN METABOLIC PANEL: CPT | Performed by: STUDENT IN AN ORGANIZED HEALTH CARE EDUCATION/TRAINING PROGRAM

## 2024-05-09 PROCEDURE — 87389 HIV-1 AG W/HIV-1&-2 AB AG IA: CPT | Performed by: STUDENT IN AN ORGANIZED HEALTH CARE EDUCATION/TRAINING PROGRAM

## 2024-05-09 PROCEDURE — 86803 HEPATITIS C AB TEST: CPT | Performed by: STUDENT IN AN ORGANIZED HEALTH CARE EDUCATION/TRAINING PROGRAM

## 2024-05-09 PROCEDURE — 85025 COMPLETE CBC W/AUTO DIFF WBC: CPT | Performed by: STUDENT IN AN ORGANIZED HEALTH CARE EDUCATION/TRAINING PROGRAM

## 2024-05-09 PROCEDURE — 82043 UR ALBUMIN QUANTITATIVE: CPT | Performed by: STUDENT IN AN ORGANIZED HEALTH CARE EDUCATION/TRAINING PROGRAM

## 2024-05-13 LAB — NONINV COLON CA DNA+OCC BLD SCRN STL QL: NEGATIVE

## 2024-05-15 ENCOUNTER — OFFICE VISIT (OUTPATIENT)
Dept: FAMILY MEDICINE | Facility: CLINIC | Age: 59
End: 2024-05-15
Payer: COMMERCIAL

## 2024-05-15 VITALS
WEIGHT: 189.19 LBS | RESPIRATION RATE: 18 BRPM | OXYGEN SATURATION: 99 % | DIASTOLIC BLOOD PRESSURE: 82 MMHG | BODY MASS INDEX: 34.82 KG/M2 | HEIGHT: 62 IN | SYSTOLIC BLOOD PRESSURE: 118 MMHG | TEMPERATURE: 98 F | HEART RATE: 75 BPM

## 2024-05-15 DIAGNOSIS — F41.9 ANXIETY: ICD-10-CM

## 2024-05-15 DIAGNOSIS — R56.9 SEIZURE-LIKE ACTIVITY: Primary | ICD-10-CM

## 2024-05-15 PROCEDURE — 99214 OFFICE O/P EST MOD 30 MIN: CPT | Mod: S$GLB,,, | Performed by: STUDENT IN AN ORGANIZED HEALTH CARE EDUCATION/TRAINING PROGRAM

## 2024-05-15 PROCEDURE — 99999 PR PBB SHADOW E&M-EST. PATIENT-LVL IV: CPT | Mod: PBBFAC,,, | Performed by: STUDENT IN AN ORGANIZED HEALTH CARE EDUCATION/TRAINING PROGRAM

## 2024-05-15 NOTE — PATIENT INSTRUCTIONS
Michael Malagon,     If you are due for any health screening(s) below please notify me so we can arrange them to be ordered and scheduled. Most healthy patients at your age complete them, but you are free to accept or refuse.     If you can't do it, I'll definitely understand. If you can, I'd certainly appreciate it!    Tests to Keep You Healthy    Mammogram: Met on 5/6/2024  Colon Cancer Screening: Met on 5/5/2024  Cervical Cancer Screening: DUE  Tobacco Cessation: NO      Your cervical cancer screening is due     Our records indicate that you may be overdue for your screening Pap smear. A Pap smear is an important health screening that can detect abnormal cells that can become cervical cancer. Cervical cancer screenings allow for early diagnosis and increase the likelihood of successful treatment.     The current recommendation for Pap smear screening is every 3-5 years for women at average risk. We encourage you to schedule your appointment with your [facility] health provider. Many women see a gynecologist for this screening, but some primary care providers also provide Pap screening.     If you recently had your Pap smear screening performed outside of [facility], please let your health care team know so that they can update your health record.      Were here to help you quit smoking     Our records indicated that you are still smoking. One of the best things you can do for your health is to stop smoking and we are here to help.     Talk with your provider about our Smoking Cessation Program and how we can support you on your journey.

## 2024-05-15 NOTE — PROGRESS NOTES
Problem List Items Addressed This Visit          Neuro    Seizure-like activity - Primary    Overview     Chronic; intermittent hx  Admitted for sz like activity in early . Reports buspar and paroxetine interaction caused sz, meds since dc'd.  Last sz ?2024.   Cont routine follow up neurology  Cont current meds               Psychiatric    Anxiety    Overview     Chronic history; hydroxyzine BID and lamictal  Denies SI/HI; no hallucinations   Has upcoming psych appt    Assoc panic attacks  Stable on current meds   Annual f/u                     Follow up routine follow up.    Emily Dumont MD  _________________________________________________________________________      Patient ID: Vesna Richards is a 58 y.o. female.    Chief Complaint:  insurance for [employer] card form    Reports credit card company will pay off her credits card as she is unable to work given epilepsy and anxiety. Previously worked at "[employer] in .     Otherwise, patient has been feeling well. No additional concerns. Denies nausea, vomiting, fevers, chills, abdominal pain, fatigue.     Health Maintenance Topics with due status: Not Due       Topic Last Completion Date    Colorectal Cancer Screening 2024    TETANUS VACCINE 2024    Mammogram 2024    Hemoglobin A1c (Diabetic Prevention Screening) 2024        ==============================================  History reviewed.   Health Maintenance Due   Topic Date Due    Cervical Cancer Screening  Never done    Lipid Panel  Never done    LDCT Lung Screen  Never done    COVID-19 Vaccine ( season) Never done       Past Medical History:  Past Medical History:   Diagnosis Date    Intractable epilepsy without status epilepticus 01/10/2024    Seizures      Past Surgical History:   Procedure Laterality Date    BREAST BIOPSY       SECTION       Review of patient's allergies indicates:   Allergen Reactions    Clindamycin Other (See Comments)     Pt  "state gives her the "grandma"seizure.    Penicillin g Anaphylaxis    Penicillins Shortness Of Breath and Nausea And Vomiting     Current Outpatient Medications on File Prior to Visit   Medication Sig Dispense Refill    atorvastatin (LIPITOR) 40 MG tablet TAKE 1 TABLET BY MOUTH EVERY DAY 90 tablet 3    hydrOXYzine pamoate (VISTARIL) 50 MG Cap Take 1 capsule (50 mg total) by mouth 2 (two) times a day.      lamoTRIgine (LAMICTAL) 100 MG tablet Take 1 tablet (100 mg total) by mouth once daily. 90 tablet 3    levETIRAcetam (KEPPRA) 750 MG Tab Take 1 tablet (750 mg total) by mouth 2 (two) times daily. 180 tablet 3    multivitamin with minerals tablet Take 1 tablet by mouth once daily.      vitamin D (VITAMIN D3) 1000 units Tab Take 5,000 Units by mouth once daily.       No current facility-administered medications on file prior to visit.     Social History     Socioeconomic History    Marital status:    Tobacco Use    Smoking status: Every Day     Current packs/day: 1.50     Average packs/day: 2.2 packs/day for 82.4 years (179.1 ttl pk-yrs)     Types: Cigarettes     Start date: 1979   Substance and Sexual Activity    Alcohol use: Not Currently    Drug use: Never    Sexual activity: Yes     Partners: Male     Social Determinants of Health     Financial Resource Strain: Low Risk  (1/10/2024)    Overall Financial Resource Strain (CARDIA)     Difficulty of Paying Living Expenses: Not very hard   Food Insecurity: Food Insecurity Present (1/10/2024)    Hunger Vital Sign     Worried About Running Out of Food in the Last Year: Sometimes true     Ran Out of Food in the Last Year: Never true   Transportation Needs: Unmet Transportation Needs (1/10/2024)    PRAPARE - Transportation     Lack of Transportation (Medical): Yes     Lack of Transportation (Non-Medical): Yes   Physical Activity: Inactive (1/10/2024)    Exercise Vital Sign     Days of Exercise per Week: 0 days     Minutes of Exercise per Session: 0 min   Stress: " Stress Concern Present (1/10/2024)    [employer] - Occupational Stress Questionnaire     Feeling of Stress : Rather much   Housing Stability: Low Risk  (1/10/2024)    Housing Stability Vital Sign     Unable to Pay for Housing in the Last Year: No     Number of Places Lived in the Last Year: 1     Unstable Housing in the Last Year: No     Family History   Problem Relation Name Age of Onset    Ovarian cancer Sister      Ovarian cancer Sister                 Objective:    Nursing note and vitals reviewed.  Vitals:    05/15/24 1420   BP: 118/82   Pulse: 75   Resp: 18   Temp: 97.8 °F (36.6 °C)     Body mass index is 34.61 kg/m².     Physical Exam   Constitutional: oriented to person, place, and time. well-developed and well-nourished. No distress.   HENT: WNL  Head: Normocephalic and atraumatic.   Eyes: EOM are normal.   Neck: Normal range of motion. Neck supple.   Cardiovascular: Normal rate  Pulmonary/Chest: Effort normal. No respiratory distress.   GI: soft, non distended, no ttp, no rebound/guarding  Musculoskeletal: Normal range of motion. no edema.   Neurological: CN II-XII intact  Skin: warm and dry.   Psychiatric: normal mood and affect. behavior is normal.            We Offer Telehealth & Same Day Appointments!   Book your Telehealth appointment with me through my nurse or   Clinic appointments on [employer]!  [phone]     To Schedule appointments online, go to [employer]: [email or web address]           Answers submitted by the patient for this visit:  Review of Systems Questionnaire (Submitted on 5/14/2024)  activity change: No  unexpected weight change: No  neck pain: No  hearing loss: No  rhinorrhea: No  trouble swallowing: No  eye discharge: No  visual disturbance: No  chest tightness: No  wheezing: No  chest pain: No  palpitations: No  blood in stool: No  constipation: No  vomiting: No  diarrhea: No  polydipsia: No  polyuria: No  difficulty  urinating: No  hematuria: No  menstrual problem: No  dysuria: No  joint swelling: No  arthralgias: No  headaches: No  weakness: No  confusion: No  dysphoric mood: No

## 2024-05-16 ENCOUNTER — PATIENT MESSAGE (OUTPATIENT)
Dept: NEUROSURGERY | Facility: CLINIC | Age: 59
End: 2024-05-16
Payer: COMMERCIAL

## 2024-05-17 ENCOUNTER — OFFICE VISIT (OUTPATIENT)
Dept: PSYCHIATRY | Facility: CLINIC | Age: 59
End: 2024-05-17
Payer: COMMERCIAL

## 2024-05-17 VITALS — HEART RATE: 89 BPM | SYSTOLIC BLOOD PRESSURE: 113 MMHG | DIASTOLIC BLOOD PRESSURE: 76 MMHG

## 2024-05-17 DIAGNOSIS — F40.01 PANIC DISORDER WITH AGORAPHOBIA: Primary | ICD-10-CM

## 2024-05-17 DIAGNOSIS — F43.10 PTSD (POST-TRAUMATIC STRESS DISORDER): ICD-10-CM

## 2024-05-17 DIAGNOSIS — F19.11 HISTORY OF SUBSTANCE ABUSE: ICD-10-CM

## 2024-05-17 DIAGNOSIS — F41.1 GENERALIZED ANXIETY DISORDER: ICD-10-CM

## 2024-05-17 PROCEDURE — 99205 OFFICE O/P NEW HI 60 MIN: CPT | Mod: S$GLB,,, | Performed by: PSYCHIATRY & NEUROLOGY

## 2024-05-17 PROCEDURE — 99999 PR PBB SHADOW E&M-EST. PATIENT-LVL III: CPT | Mod: PBBFAC,,, | Performed by: PSYCHIATRY & NEUROLOGY
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
## 2024-05-22 ENCOUNTER — PATIENT MESSAGE (OUTPATIENT)
Dept: ADMINISTRATIVE | Facility: HOSPITAL | Age: 59
End: 2024-05-22
Payer: COMMERCIAL

## 2024-05-23 ENCOUNTER — PATIENT OUTREACH (OUTPATIENT)
Dept: ADMINISTRATIVE | Facility: HOSPITAL | Age: 59
End: 2024-05-23
Payer: COMMERCIAL

## 2024-05-23 NOTE — PROGRESS NOTES
Replying to Campaign Questionnaire for Overdue HM: Cervical Screening      Called patient as requested. No answer, lvm to return call to office. Portal message also sent

## 2024-05-28 NOTE — PROGRESS NOTES
2nd Attempt: Called patient as requested. No answer, lvm to return call to office. Portal message read, no reply.

## 2024-06-17 DIAGNOSIS — F44.5 NONEPILEPTIC ATTACK DISORDER: ICD-10-CM

## 2024-06-17 DIAGNOSIS — G40.919 INTRACTABLE EPILEPSY WITHOUT STATUS EPILEPTICUS, UNSPECIFIED EPILEPSY TYPE: Primary | ICD-10-CM

## 2024-06-17 RX ORDER — LAMOTRIGINE 150 MG/1
150 TABLET ORAL DAILY
Qty: 30 TABLET | Refills: 11 | Status: SHIPPED | OUTPATIENT
Start: 2024-06-17 | End: 2025-06-17

## 2024-06-18 ENCOUNTER — LAB VISIT (OUTPATIENT)
Dept: LAB | Facility: HOSPITAL | Age: 59
End: 2024-06-18
Payer: COMMERCIAL

## 2024-06-18 DIAGNOSIS — G40.919 INTRACTABLE EPILEPSY WITHOUT STATUS EPILEPTICUS, UNSPECIFIED EPILEPSY TYPE: ICD-10-CM

## 2024-06-18 DIAGNOSIS — F44.5 NONEPILEPTIC ATTACK DISORDER: ICD-10-CM

## 2024-06-18 PROCEDURE — 36415 COLL VENOUS BLD VENIPUNCTURE: CPT | Mod: PO | Performed by: NURSE PRACTITIONER

## 2024-06-18 PROCEDURE — 80177 DRUG SCRN QUAN LEVETIRACETAM: CPT | Performed by: NURSE PRACTITIONER

## 2024-06-18 PROCEDURE — 80175 DRUG SCREEN QUAN LAMOTRIGINE: CPT | Performed by: NURSE PRACTITIONER

## 2024-06-21 ENCOUNTER — OFFICE VISIT (OUTPATIENT)
Dept: PSYCHIATRY | Facility: CLINIC | Age: 59
End: 2024-06-21
Payer: COMMERCIAL

## 2024-06-21 DIAGNOSIS — F43.10 PTSD (POST-TRAUMATIC STRESS DISORDER): ICD-10-CM

## 2024-06-21 DIAGNOSIS — F41.1 GENERALIZED ANXIETY DISORDER: ICD-10-CM

## 2024-06-21 DIAGNOSIS — F40.01 PANIC DISORDER WITH AGORAPHOBIA: ICD-10-CM

## 2024-06-21 LAB — LEVETIRACETAM SERPL-MCNC: 20.5 UG/ML (ref 3–60)

## 2024-06-21 PROCEDURE — 90834 PSYTX W PT 45 MINUTES: CPT | Mod: 95,,, | Performed by: SOCIAL WORKER

## 2024-06-24 LAB — LAMOTRIGINE SERPL-MCNC: 3.5 UG/ML (ref 2–15)
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
## 2024-06-27 ENCOUNTER — TELEPHONE (OUTPATIENT)
Dept: PSYCHIATRY | Facility: CLINIC | Age: 59
End: 2024-06-27
Payer: COMMERCIAL

## 2024-07-01 ENCOUNTER — OFFICE VISIT (OUTPATIENT)
Dept: PSYCHIATRY | Facility: CLINIC | Age: 59
End: 2024-07-01
Payer: COMMERCIAL

## 2024-07-01 DIAGNOSIS — F41.1 GENERALIZED ANXIETY DISORDER: ICD-10-CM

## 2024-07-01 DIAGNOSIS — F43.10 PTSD (POST-TRAUMATIC STRESS DISORDER): Primary | ICD-10-CM

## 2024-07-01 DIAGNOSIS — F40.01 PANIC DISORDER WITH AGORAPHOBIA: ICD-10-CM

## 2024-07-01 PROCEDURE — 90834 PSYTX W PT 45 MINUTES: CPT | Mod: 95,,, | Performed by: SOCIAL WORKER
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
## 2024-08-05 PROBLEM — Z00.00 ANNUAL PHYSICAL EXAM: Status: RESOLVED | Noted: 2023-05-29 | Resolved: 2024-08-05

## 2024-08-06 ENCOUNTER — PATIENT MESSAGE (OUTPATIENT)
Dept: NEUROSURGERY | Facility: CLINIC | Age: 59
End: 2024-08-06
Payer: COMMERCIAL

## 2024-08-06 ENCOUNTER — PATIENT MESSAGE (OUTPATIENT)
Dept: NEUROLOGY | Facility: CLINIC | Age: 59
End: 2024-08-06
Payer: COMMERCIAL

## 2024-08-07 ENCOUNTER — NURSE TRIAGE (OUTPATIENT)
Dept: ADMINISTRATIVE | Facility: CLINIC | Age: 59
End: 2024-08-07
Payer: COMMERCIAL

## 2024-08-07 ENCOUNTER — PATIENT MESSAGE (OUTPATIENT)
Dept: NEUROLOGY | Facility: CLINIC | Age: 59
End: 2024-08-07
Payer: COMMERCIAL

## 2024-08-07 DIAGNOSIS — R56.9 SEIZURE-LIKE ACTIVITY: Primary | ICD-10-CM

## 2024-08-08 ENCOUNTER — TELEPHONE (OUTPATIENT)
Dept: NEUROLOGY | Facility: CLINIC | Age: 59
End: 2024-08-08
Payer: COMMERCIAL

## 2024-08-13 ENCOUNTER — TELEPHONE (OUTPATIENT)
Dept: NEUROLOGY | Facility: CLINIC | Age: 59
End: 2024-08-13
Payer: COMMERCIAL

## 2024-08-13 ENCOUNTER — PATIENT MESSAGE (OUTPATIENT)
Dept: FAMILY MEDICINE | Facility: CLINIC | Age: 59
End: 2024-08-13
Payer: COMMERCIAL

## 2024-08-13 DIAGNOSIS — R56.9 SEIZURE-LIKE ACTIVITY: Primary | ICD-10-CM

## 2024-08-13 NOTE — TELEPHONE ENCOUNTER
Scheduled EMU 8/22/24, 11am. Aware an adult is required to accompany her for the duration including overnight. Aware she will be connected to lines to her head, chest, arm, have an IV placed; will not be able to leave the room until all equipment is removed at discharge. Instructions thoroughly discussed; mailed via [employer]. Reports she is a smoker and knows there is absolutely no smoking or vaping in the hospital and can request a nicotine patch.

## 2024-08-22 ENCOUNTER — HOSPITAL ENCOUNTER (INPATIENT)
Facility: HOSPITAL | Age: 59
LOS: 5 days | Discharge: HOME | DRG: 244 | End: 2024-08-27
Attending: STUDENT IN AN ORGANIZED HEALTH CARE EDUCATION/TRAINING PROGRAM | Admitting: STUDENT IN AN ORGANIZED HEALTH CARE EDUCATION/TRAINING PROGRAM
Payer: COMMERCIAL

## 2024-08-22 DIAGNOSIS — I44.2 COMPLETE AV BLOCK: ICD-10-CM

## 2024-08-22 DIAGNOSIS — R56.9 SEIZURE: ICD-10-CM

## 2024-08-22 DIAGNOSIS — R07.9 CHEST PAIN: ICD-10-CM

## 2024-08-22 DIAGNOSIS — I49.9 ARRHYTHMIA: ICD-10-CM

## 2024-08-22 DIAGNOSIS — I46.9 ASYSTOLE: ICD-10-CM

## 2024-08-22 DIAGNOSIS — I44.2 COMPLETE AV BLOCK: Primary | ICD-10-CM

## 2024-08-22 DIAGNOSIS — R56.9 SEIZURE-LIKE ACTIVITY: ICD-10-CM

## 2024-08-22 DIAGNOSIS — F43.10 PTSD (POST-TRAUMATIC STRESS DISORDER): ICD-10-CM

## 2024-08-22 LAB
ALBUMIN SERPL BCP-MCNC: 3.5 G/DL (ref 3.5–5.2)
ALP SERPL-CCNC: 113 U/L (ref 55–135)
ALT SERPL W/O P-5'-P-CCNC: 25 U/L (ref 10–44)
AMPHET+METHAMPHET UR QL: NEGATIVE
ANION GAP SERPL CALC-SCNC: 9 MMOL/L (ref 8–16)
AST SERPL-CCNC: 17 U/L (ref 10–40)
BARBITURATES UR QL SCN>200 NG/ML: NEGATIVE
BASOPHILS # BLD AUTO: 0.05 K/UL (ref 0–0.2)
BASOPHILS NFR BLD: 0.6 % (ref 0–1.9)
BENZODIAZ UR QL SCN>200 NG/ML: NEGATIVE
BILIRUB SERPL-MCNC: 0.3 MG/DL (ref 0.1–1)
BUN SERPL-MCNC: 11 MG/DL (ref 6–20)
BZE UR QL SCN: NEGATIVE
CALCIUM SERPL-MCNC: 9.4 MG/DL (ref 8.7–10.5)
CANNABINOIDS UR QL SCN: NEGATIVE
CHLORIDE SERPL-SCNC: 106 MMOL/L (ref 95–110)
CO2 SERPL-SCNC: 26 MMOL/L (ref 23–29)
CREAT SERPL-MCNC: 1.1 MG/DL (ref 0.5–1.4)
CREAT UR-MCNC: 45 MG/DL (ref 15–325)
DIFFERENTIAL METHOD BLD: NORMAL
EOSINOPHIL # BLD AUTO: 0.1 K/UL (ref 0–0.5)
EOSINOPHIL NFR BLD: 0.6 % (ref 0–8)
ERYTHROCYTE [DISTWIDTH] IN BLOOD BY AUTOMATED COUNT: 12.8 % (ref 11.5–14.5)
EST. GFR  (NO RACE VARIABLE): 58.2 ML/MIN/1.73 M^2
ETHANOL UR-MCNC: <10 MG/DL
GLUCOSE SERPL-MCNC: 126 MG/DL (ref 70–110)
HCT VFR BLD AUTO: 46.4 % (ref 37–48.5)
HGB BLD-MCNC: 15.6 G/DL (ref 12–16)
IMM GRANULOCYTES # BLD AUTO: 0.02 K/UL (ref 0–0.04)
IMM GRANULOCYTES NFR BLD AUTO: 0.2 % (ref 0–0.5)
LYMPHOCYTES # BLD AUTO: 2.8 K/UL (ref 1–4.8)
LYMPHOCYTES NFR BLD: 35.3 % (ref 18–48)
MCH RBC QN AUTO: 29.9 PG (ref 27–31)
MCHC RBC AUTO-ENTMCNC: 33.6 G/DL (ref 32–36)
MCV RBC AUTO: 89 FL (ref 82–98)
METHADONE UR QL SCN>300 NG/ML: NEGATIVE
MONOCYTES # BLD AUTO: 0.6 K/UL (ref 0.3–1)
MONOCYTES NFR BLD: 7.3 % (ref 4–15)
NEUTROPHILS # BLD AUTO: 4.5 K/UL (ref 1.8–7.7)
NEUTROPHILS NFR BLD: 56 % (ref 38–73)
NRBC BLD-RTO: 0 /100 WBC
OPIATES UR QL SCN: NEGATIVE
PCP UR QL SCN>25 NG/ML: NEGATIVE
PLATELET # BLD AUTO: 259 K/UL (ref 150–450)
PMV BLD AUTO: 9.9 FL (ref 9.2–12.9)
POTASSIUM SERPL-SCNC: 3.9 MMOL/L (ref 3.5–5.1)
PROT SERPL-MCNC: 6.5 G/DL (ref 6–8.4)
RBC # BLD AUTO: 5.22 M/UL (ref 4–5.4)
SODIUM SERPL-SCNC: 141 MMOL/L (ref 136–145)
TOXICOLOGY INFORMATION: NORMAL
WBC # BLD AUTO: 8.05 K/UL (ref 3.9–12.7)

## 2024-08-22 PROCEDURE — 80175 DRUG SCREEN QUAN LAMOTRIGINE: CPT

## 2024-08-22 PROCEDURE — 95714 VEEG EA 12-26 HR UNMNTR: CPT

## 2024-08-22 PROCEDURE — 95720 EEG PHY/QHP EA INCR W/VEEG: CPT | Mod: ,,, | Performed by: STUDENT IN AN ORGANIZED HEALTH CARE EDUCATION/TRAINING PROGRAM

## 2024-08-22 PROCEDURE — 85025 COMPLETE CBC W/AUTO DIFF WBC: CPT

## 2024-08-22 PROCEDURE — 25000003 PHARM REV CODE 250

## 2024-08-22 PROCEDURE — 95700 EEG CONT REC W/VID EEG TECH: CPT

## 2024-08-22 PROCEDURE — 80307 DRUG TEST PRSMV CHEM ANLYZR: CPT

## 2024-08-22 PROCEDURE — 11000001 HC ACUTE MED/SURG PRIVATE ROOM

## 2024-08-22 PROCEDURE — 80177 DRUG SCRN QUAN LEVETIRACETAM: CPT

## 2024-08-22 PROCEDURE — 36415 COLL VENOUS BLD VENIPUNCTURE: CPT

## 2024-08-22 PROCEDURE — 99223 1ST HOSP IP/OBS HIGH 75: CPT | Mod: AI,,, | Performed by: STUDENT IN AN ORGANIZED HEALTH CARE EDUCATION/TRAINING PROGRAM

## 2024-08-22 PROCEDURE — 80053 COMPREHEN METABOLIC PANEL: CPT

## 2024-08-22 RX ORDER — ATORVASTATIN CALCIUM 40 MG/1
40 TABLET, FILM COATED ORAL DAILY
Status: DISCONTINUED | OUTPATIENT
Start: 2024-08-23 | End: 2024-08-27 | Stop reason: HOSPADM

## 2024-08-22 RX ORDER — SODIUM CHLORIDE 0.9 % (FLUSH) 0.9 %
10 SYRINGE (ML) INJECTION
Status: DISCONTINUED | OUTPATIENT
Start: 2024-08-22 | End: 2024-08-27 | Stop reason: HOSPADM

## 2024-08-22 RX ORDER — DOCUSATE SODIUM 100 MG/1
100 CAPSULE, LIQUID FILLED ORAL 2 TIMES DAILY PRN
Status: DISCONTINUED | OUTPATIENT
Start: 2024-08-22 | End: 2024-08-27 | Stop reason: HOSPADM

## 2024-08-22 RX ORDER — LORAZEPAM 2 MG/ML
2 INJECTION INTRAMUSCULAR
Status: DISCONTINUED | OUTPATIENT
Start: 2024-08-22 | End: 2024-08-27 | Stop reason: HOSPADM

## 2024-08-22 RX ORDER — HYDROXYZINE PAMOATE 25 MG/1
50 CAPSULE ORAL 2 TIMES DAILY
Status: DISCONTINUED | OUTPATIENT
Start: 2024-08-22 | End: 2024-08-27 | Stop reason: HOSPADM

## 2024-08-22 RX ORDER — ACETAMINOPHEN 325 MG/1
650 TABLET ORAL EVERY 4 HOURS PRN
Status: DISCONTINUED | OUTPATIENT
Start: 2024-08-22 | End: 2024-08-27 | Stop reason: HOSPADM

## 2024-08-22 RX ORDER — ACETAMINOPHEN 500 MG
5000 TABLET ORAL DAILY
Status: DISCONTINUED | OUTPATIENT
Start: 2024-08-22 | End: 2024-08-27 | Stop reason: HOSPADM

## 2024-08-22 RX ORDER — ONDANSETRON 8 MG/1
8 TABLET, ORALLY DISINTEGRATING ORAL EVERY 8 HOURS PRN
Status: DISCONTINUED | OUTPATIENT
Start: 2024-08-22 | End: 2024-08-27 | Stop reason: HOSPADM

## 2024-08-22 RX ADMIN — HYDROXYZINE PAMOATE 50 MG: 25 CAPSULE ORAL at 08:08
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
## 2024-08-22 NOTE — SUBJECTIVE & OBJECTIVE
"Past Medical History:   Diagnosis Date    Intractable epilepsy without status epilepticus 01/10/2024    Seizures        Past Surgical History:   Procedure Laterality Date    BREAST BIOPSY       SECTION         Review of patient's allergies indicates:   Allergen Reactions    Clindamycin Other (See Comments)     Pt state gives her the "grandma"seizure.    Penicillin g Anaphylaxis    Penicillins Shortness Of Breath and Nausea And Vomiting       No current facility-administered medications on file prior to encounter.     Current Outpatient Medications on File Prior to Encounter   Medication Sig    atorvastatin (LIPITOR) 40 MG tablet TAKE 1 TABLET BY MOUTH EVERY DAY    hydrOXYzine pamoate (VISTARIL) 50 MG Cap Take 1 capsule (50 mg total) by mouth 2 (two) times a day.    lamoTRIgine (LAMICTAL) 150 MG Tab Take 1 tablet (150 mg total) by mouth once daily.    levETIRAcetam (KEPPRA) 750 MG Tab Take 1 tablet (750 mg total) by mouth 2 (two) times daily.    multivitamin with minerals tablet Take 1 tablet by mouth once daily.    vitamin D (VITAMIN D3) 1000 units Tab Take 5,000 Units by mouth once daily.     Continuous Infusions:    Family History       Problem Relation (Age of Onset)    Ovarian cancer Sister, Sister          Tobacco Use    Smoking status: Every Day     Current packs/day: 1.50     Average packs/day: 2.2 packs/day for 82.6 years (179.5 ttl pk-yrs)     Types: Cigarettes     Start date:     Smokeless tobacco: Not on file   Substance and Sexual Activity    Alcohol use: Not Currently    Drug use: Never    Sexual activity: Yes     Partners: Male     Review of Systems   Constitutional:  Negative for chills and fever.   HENT:  Negative for rhinorrhea and trouble swallowing.    Eyes:  Negative for discharge.   Respiratory:  Negative for cough and shortness of breath.    Cardiovascular:  Negative for chest pain and leg swelling.   Gastrointestinal:  Negative for abdominal pain and nausea.   Genitourinary:  " "Negative for dysuria and hematuria.   Musculoskeletal:  Positive for arthralgias. Negative for myalgias.        Left shoulder   Neurological:  Positive for dizziness, tremors and seizures. Negative for headaches.        Dizziness sometimes when walking. Does not report the room spinning but the floor beneath her is "falling down".    Psychiatric/Behavioral:  Negative for agitation and confusion. The patient is nervous/anxious.      Objective:     Vital Signs (Most Recent):  Temp: 98 °F (36.7 °C) (08/22/24 1205)  Pulse: 94 (08/22/24 1205)  Resp: 20 (08/22/24 1205)  BP: 138/65 (08/22/24 1205)  SpO2: 96 % (08/22/24 1205) Vital Signs (24h Range):  Temp:  [98 °F (36.7 °C)] 98 °F (36.7 °C)  Pulse:  [94] 94  Resp:  [20] 20  SpO2:  [96 %] 96 %  BP: (138)/(65) 138/65        There is no height or weight on file to calculate BMI.     Physical Exam  Constitutional:       Appearance: She is obese.   HENT:      Head: Normocephalic and atraumatic.      Nose: Nose normal.      Mouth/Throat:      Pharynx: Oropharynx is clear.   Eyes:      Extraocular Movements: Extraocular movements intact.      Conjunctiva/sclera: Conjunctivae normal.   Cardiovascular:      Rate and Rhythm: Normal rate.      Pulses: Normal pulses.   Pulmonary:      Effort: Pulmonary effort is normal.   Abdominal:      General: Abdomen is flat.      Palpations: Abdomen is soft.   Musculoskeletal:         General: Normal range of motion.      Cervical back: Normal range of motion.      Comments: Limited range of motion in the left arm with abduction   Neurological:      General: No focal deficit present.      Mental Status: She is alert and oriented to person, place, and time.      Cranial Nerves: Cranial nerves 2-12 are intact.      Coordination: Finger-Nose-Finger Test normal.      Deep Tendon Reflexes:      Reflex Scores:       Tricep reflexes are 2+ on the right side and 2+ on the left side.       Bicep reflexes are 2+ on the right side and 2+ on the left side.   " "    Brachioradialis reflexes are 2+ on the right side and 2+ on the left side.       Patellar reflexes are 2+ on the right side and 2+ on the left side.     Comments: Patient appears anxious. Affect and mood are congruent.             NEUROLOGICAL EXAMINATION:     MENTAL STATUS   Oriented to person, place, and time.     CRANIAL NERVES   Cranial nerves II through XII intact.     MOTOR EXAM   Overall muscle tone: normal    REFLEXES     Reflexes   Right brachioradialis: 2+  Left brachioradialis: 2+  Right biceps: 2+  Left biceps: 2+  Right triceps: 2+  Left triceps: 2+  Right patellar: 2+  Left patellar: 2+    SENSORY EXAM   Light touch normal.     GAIT AND COORDINATION      Coordination   Finger to nose coordination: normal    Tremor   Resting tremor: absent  Intention tremor: present       While walking the patient felt dizzy and fell to the ground. She described as if the room was "falling down".        Significant Labs: CBC:   Recent Labs   Lab 08/22/24  1255   WBC 8.05   HGB 15.6   HCT 46.4        CMP:   Recent Labs   Lab 08/22/24  1255   *      K 3.9      CO2 26   BUN 11   CREATININE 1.1   CALCIUM 9.4   PROT 6.5   ALBUMIN 3.5   BILITOT 0.3   ALKPHOS 113   AST 17   ALT 25   ANIONGAP 9       Significant Studies: EEG: I have reviewed all pertinent results/findings within the past 24 hours  "

## 2024-08-22 NOTE — PROGRESS NOTES
Nurses Note -- 4 Eyes      8/22/2024   5:53 PM      Skin assessed during: Admit      [x] No Altered Skin Integrity Present    []Prevention Measures Documented      [] Yes- Altered Skin Integrity Present or Discovered   [] LDA Added if Not in Epic (Describe Wound)   [] New Altered Skin Integrity was Present on Admit and Documented in LDA   [] Wound Image Taken    Wound Care Consulted? No    Attending Nurse:  Radha Bustillo RN/Staff Member:   Ninfa
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
## 2024-08-22 NOTE — ASSESSMENT & PLAN NOTE
- Provided smoking cessation counseling  - Patient does not want to use a nicotine patch during admission

## 2024-08-22 NOTE — Clinical Note
A dressing was applied to the incision. To be applied per ICU RN 30 min following Dermabond application
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
## 2024-08-22 NOTE — Clinical Note
The lead was inserted under fluorscopic guidance and thresholds were tested. A new lead was attached to the left bundle.

## 2024-08-22 NOTE — Clinical Note
The lead was inserted under fluorscopic guidance and thresholds were tested. A new lead was attached to the right atrium.
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
## 2024-08-22 NOTE — Clinical Note
Department of Anesthesiology  Preprocedure Note       Name:  Polly Castillo   Age:  54 y.o.  :  1965                                          MRN:  [id number]         Date:  2021      Surgeon: Elin Romero):  Priscilla Meadows MD    Procedure: Procedure(s):  EXAM UNDER ANESTHESIA, DILATATION AND CURETTAGE HYSTEROSCOPY, MYOSURE    Medications prior to admission:   Prior to Admission medications    Medication Sig Start Date End Date Taking? Authorizing Provider   omeprazole (PRILOSEC) 40 MG delayed release capsule Take 40 mg by mouth daily   Yes Historical Provider, MD   loratadine (CLARITIN CHILDRENS) 5 MG chewable tablet Take 1 tablet by mouth daily 18  Yes ZIYAD Hathaway   atenolol (TENORMIN) 25 MG tablet Take 1 tablet by mouth daily 18  Yes ZIYAD Hathaway   miSOPROStol (CYTOTEC) 200 MCG tablet Place 1 tablet vaginally once for 1 dose The night before procedure 21  Priscilla Meadows MD   famotidine (PEPCID) 20 MG tablet Take 1 tablet by mouth nightly  Patient taking differently: Take 20 mg by mouth nightly as needed Not taking 21   JESSICA Turpin   albuterol sulfate (PROAIR RESPICLICK) 298 (90 Base) MCG/ACT aerosol powder inhalation Inhale 2 puffs into the lungs every 6 hours as needed for Wheezing or Shortness of Breath 18   ZIYAD Hathaway       Current medications:    No current facility-administered medications for this encounter.        Allergies:  No Known Allergies    Problem List:    Patient Active Problem List   Diagnosis Code    Vasovagal syncope R55    Acute non-recurrent sinusitis J01.90    Mild intermittent asthma without complication Z53.11    Dizziness R42    Otitis media with effusion H65.90    Family history of colon cancer Z80.0    Environmental allergies Z91.09    Gastroesophageal reflux disease without esophagitis K21.9    Transaminitis R74.01    Heartburn R12    Belching R14.2    Fatty liver K76.0       Past Medical History: The generator was inserted into pocket in the left upper chest. Diagnosis Date    Acne     Allergic rhinitis     Asthma     due to allergies    GERD (gastroesophageal reflux disease)     Hypertension     Palpitations     Vasovagal syncope        Past Surgical History:        Procedure Laterality Date    ADENOIDECTOMY      CHOLECYSTECTOMY      COLONOSCOPY      COLONOSCOPY N/A 4/23/2018    Dr [id number]-[id number] x 2--5 yr recall    TONSILLECTOMY         Social History:    Social History     Tobacco Use    Smoking status: Never Smoker    Smokeless tobacco: Never Used   Substance Use Topics    Alcohol use: No     Alcohol/week: 0.0 standard drinks                                Counseling given: Not Answered      Vital Signs (Current):   Vitals:    08/20/21 0839 08/20/21 0852   BP: (!) 155/84 (!) 155/84   Pulse: 94 94   Resp: 12 14   Temp: 98.2 °F (36.8 °C) 98.2 °F (36.8 °C)   TempSrc: Temporal    SpO2: 95% 94%   Weight: 208 lb (94.3 kg) 208 lb (94.3 kg)   Height: 5' 3\" (1.6 m) 5' 3\" (1.6 m)                                              BP Readings from Last 3 Encounters:   08/20/21 (!) 155/84   08/09/21 (!) 140/80   07/28/21 (!) 142/76       NPO Status: Time of last liquid consumption: 2000                        Time of last solid consumption: 2000                        Date of last liquid consumption: 08/19/21                        Date of last solid food consumption: 08/19/21    BMI:   Wt Readings from Last 3 Encounters:   08/20/21 208 lb (94.3 kg)   08/18/21 208 lb (94.3 kg)   08/09/21 206 lb (93.4 kg)     Body mass index is 36.85 kg/m².     CBC:   Lab Results   Component Value Date    WBC 7.3 08/18/2021    RBC 4.75 08/18/2021    HGB 12.7 08/18/2021    HCT 41.4 08/18/2021    MCV 87.2 08/18/2021    RDW 13.4 08/18/2021     08/18/2021       CMP:   Lab Results   Component Value Date     08/18/2021    K 4.8 08/18/2021    CL 99 08/18/2021    CO2 26 08/18/2021    BUN 10 08/18/2021    CREATININE 0.8 08/18/2021    GFRAA >59 08/18/2021    LABGLOM >60 08/18/2021    GLUCOSE 158 08/18/2021    PROT 7.6 08/18/2021    CALCIUM 9.5 08/18/2021    BILITOT 0.5 08/18/2021    ALKPHOS 67 08/18/2021     08/18/2021     08/18/2021       POC Tests: No results for input(s): POCGLU, POCNA, POCK, POCCL, POCBUN, POCHEMO, POCHCT in the last 72 hours. Coags: No results found for: PROTIME, INR, APTT    HCG (If Applicable):   Lab Results   Component Value Date    PREGTESTUR Negative 08/20/2021        ABGs: No results found for: PHART, PO2ART, IJC8LMD, KTI7FUS, BEART, E8LFSYQU     Type & Screen (If Applicable):  No results found for: LABABO, LABRH    Drug/Infectious Status (If Applicable):  No results found for: HIV, HEPCAB    COVID-19 Screening (If Applicable): No results found for: COVID19        Anesthesia Evaluation  Patient summary reviewed and Nursing notes reviewed history of anesthetic complications (slow to wake up): Airway: Mallampati: II  TM distance: >3 FB   Neck ROM: full  Mouth opening: > = 3 FB Dental: normal exam         Pulmonary:normal exam    (+) asthma: allergic asthma, seasonal asthma,                            Cardiovascular:    (+) hypertension:,       ECG reviewed               Beta Blocker:  Dose within 24 Hrs         Neuro/Psych:      (-) seizures and CVA           GI/Hepatic/Renal:   (+) GERD: well controlled,           Endo/Other:        (-) diabetes mellitus               Abdominal:             Vascular: negative vascular ROS. Other Findings:           Anesthesia Plan      general     ASA 2       Induction: intravenous. MIPS: Postoperative opioids intended and Prophylactic antiemetics administered. Anesthetic plan and risks discussed with patient.                       Louise Clements MD   8/20/2021

## 2024-08-23 PROBLEM — F17.200 TOBACCO DEPENDENCY: Status: ACTIVE | Noted: 2024-08-23

## 2024-08-23 LAB
ALBUMIN SERPL BCP-MCNC: 3.5 G/DL (ref 3.5–5.2)
ALP SERPL-CCNC: 117 U/L (ref 55–135)
ALT SERPL W/O P-5'-P-CCNC: 39 U/L (ref 10–44)
ANION GAP SERPL CALC-SCNC: 9 MMOL/L (ref 8–16)
AST SERPL-CCNC: 22 U/L (ref 10–40)
BILIRUB SERPL-MCNC: 0.2 MG/DL (ref 0.1–1)
BUN SERPL-MCNC: 14 MG/DL (ref 6–20)
CALCIUM SERPL-MCNC: 9.5 MG/DL (ref 8.7–10.5)
CHLORIDE SERPL-SCNC: 105 MMOL/L (ref 95–110)
CO2 SERPL-SCNC: 26 MMOL/L (ref 23–29)
CREAT SERPL-MCNC: 1.2 MG/DL (ref 0.5–1.4)
EST. GFR  (NO RACE VARIABLE): 52.5 ML/MIN/1.73 M^2
GLUCOSE SERPL-MCNC: 122 MG/DL (ref 70–110)
LACTATE SERPL-SCNC: 0.9 MMOL/L (ref 0.5–2.2)
MAGNESIUM SERPL-MCNC: 2.1 MG/DL (ref 1.6–2.6)
POCT GLUCOSE: 114 MG/DL (ref 70–110)
POTASSIUM SERPL-SCNC: 4.2 MMOL/L (ref 3.5–5.1)
PROT SERPL-MCNC: 6.7 G/DL (ref 6–8.4)
SODIUM SERPL-SCNC: 140 MMOL/L (ref 136–145)
TROPONIN I SERPL DL<=0.01 NG/ML-MCNC: <0.006 NG/ML (ref 0–0.03)

## 2024-08-23 PROCEDURE — 25000003 PHARM REV CODE 250

## 2024-08-23 PROCEDURE — 36415 COLL VENOUS BLD VENIPUNCTURE: CPT | Performed by: PSYCHIATRY & NEUROLOGY

## 2024-08-23 PROCEDURE — 84484 ASSAY OF TROPONIN QUANT: CPT | Performed by: PSYCHIATRY & NEUROLOGY

## 2024-08-23 PROCEDURE — 99233 SBSQ HOSP IP/OBS HIGH 50: CPT | Mod: ,,, | Performed by: PHYSICIAN ASSISTANT

## 2024-08-23 PROCEDURE — 93005 ELECTROCARDIOGRAM TRACING: CPT

## 2024-08-23 PROCEDURE — 20000000 HC ICU ROOM

## 2024-08-23 PROCEDURE — 83605 ASSAY OF LACTIC ACID: CPT | Performed by: STUDENT IN AN ORGANIZED HEALTH CARE EDUCATION/TRAINING PROGRAM

## 2024-08-23 PROCEDURE — 95720 EEG PHY/QHP EA INCR W/VEEG: CPT | Mod: ,,, | Performed by: PSYCHIATRY & NEUROLOGY

## 2024-08-23 PROCEDURE — 80053 COMPREHEN METABOLIC PANEL: CPT | Performed by: PSYCHIATRY & NEUROLOGY

## 2024-08-23 PROCEDURE — 93010 ELECTROCARDIOGRAM REPORT: CPT | Mod: ,,, | Performed by: STUDENT IN AN ORGANIZED HEALTH CARE EDUCATION/TRAINING PROGRAM

## 2024-08-23 PROCEDURE — 93010 ELECTROCARDIOGRAM REPORT: CPT | Mod: 76,,, | Performed by: STUDENT IN AN ORGANIZED HEALTH CARE EDUCATION/TRAINING PROGRAM

## 2024-08-23 PROCEDURE — 95714 VEEG EA 12-26 HR UNMNTR: CPT

## 2024-08-23 PROCEDURE — 83735 ASSAY OF MAGNESIUM: CPT | Performed by: PSYCHIATRY & NEUROLOGY

## 2024-08-23 RX ORDER — IBUPROFEN 200 MG
1 TABLET ORAL DAILY
Status: DISCONTINUED | OUTPATIENT
Start: 2024-08-23 | End: 2024-08-23

## 2024-08-23 RX ORDER — IBUPROFEN 200 MG
1 TABLET ORAL DAILY PRN
Status: DISCONTINUED | OUTPATIENT
Start: 2024-08-23 | End: 2024-08-27 | Stop reason: HOSPADM

## 2024-08-23 RX ADMIN — CHOLECALCIFEROL TAB 125 MCG (5000 UNIT) 5000 UNITS: 125 TAB at 09:08

## 2024-08-23 RX ADMIN — ATORVASTATIN CALCIUM 40 MG: 40 TABLET, FILM COATED ORAL at 09:08

## 2024-08-23 RX ADMIN — ACETAMINOPHEN 650 MG: 325 TABLET ORAL at 09:08

## 2024-08-23 RX ADMIN — HYDROXYZINE PAMOATE 50 MG: 25 CAPSULE ORAL at 08:08

## 2024-08-23 RX ADMIN — HYDROXYZINE PAMOATE 50 MG: 25 CAPSULE ORAL at 09:08
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
## 2024-08-23 NOTE — SUBJECTIVE & OBJECTIVE
"Interval History: No typical events since admission, event 8/22 pm and 8/23 am of intense anxiety, feeling like body is "vibrating all over", patient able to respond appropriately throughout events. Patient and significant other report these are not her typical events. Encouraged to stay awake/restrict sleep as tolerated for event capture.     Current Facility-Administered Medications   Medication Dose Route Frequency Provider Last Rate Last Admin    acetaminophen tablet 650 mg  650 mg Oral Q4H PRN Delfino Verma MD   650 mg at 08/23/24 0952    atorvastatin tablet 40 mg  40 mg Oral Daily Delfino Verma MD   40 mg at 08/23/24 0952    cholecalciferol (vitamin D3) 125 mcg (5,000 unit) tablet 5,000 Units  5,000 Units Oral Daily Delfino Verma MD   5,000 Units at 08/23/24 0953    docusate sodium capsule 100 mg  100 mg Oral BID PRN Delfino Verma MD        hydrOXYzine pamoate capsule 50 mg  50 mg Oral BID Delfino Verma MD   50 mg at 08/23/24 0952    LORazepam injection 2 mg  2 mg Intravenous PRN Delfino Verma MD        nicotine 21 mg/24 hr 1 patch  1 patch Transdermal Daily PRN Nicole Guerrero PA-C        ondansetron disintegrating tablet 8 mg  8 mg Oral Q8H PRN Delfino Verma MD        sodium chloride 0.9% flush 10 mL  10 mL Intravenous PRN Delfino Verma MD         Continuous Infusions:    Review of Systems   Constitutional:  Negative for chills and fever.   Respiratory:  Positive for cough (chronic). Negative for wheezing.    Neurological:  Negative for facial asymmetry and speech difficulty.   Psychiatric/Behavioral:  Negative for agitation.      Objective:     Vital Signs (Most Recent):  Temp: 97.9 °F (36.6 °C) (08/23/24 0733)  Pulse: 71 (08/23/24 0733)  Resp: 18 (08/23/24 0733)  BP: 109/60 (08/23/24 0733)  SpO2: 95 % (08/23/24 0733) Vital Signs (24h Range):  Temp:  [97.6 °F (36.4 °C)-98.5 °F (36.9 °C)] 97.9 °F (36.6 °C)  Pulse:  [68-94] 71  Resp:  [16-20] 18  SpO2:  [94 %-97 %] 95 %  BP: (109-138)/(60-70) " 109/60     Weight: 86.2 kg (190 lb)  Body mass index is 33.66 kg/m².     Physical Exam  Constitutional:       Appearance: She is not ill-appearing.   HENT:      Head: Normocephalic and atraumatic.      Comments: EEG in place  Eyes:      General: No scleral icterus.     Extraocular Movements: Extraocular movements intact and EOM normal.      Pupils: Pupils are equal, round, and reactive to light.   Pulmonary:      Effort: Pulmonary effort is normal. No respiratory distress.   Musculoskeletal:         General: No deformity or signs of injury.      Cervical back: Neck supple. No rigidity.   Skin:     General: Skin is warm and dry.   Neurological:      Mental Status: She is alert and oriented to person, place, and time.   Psychiatric:         Mood and Affect: Mood is anxious.         Speech: Speech normal.            NEUROLOGICAL EXAMINATION:     MENTAL STATUS   Oriented to person, place, and time.   Attention: normal. Concentration: normal.   Speech: speech is normal   Level of consciousness: alert    CRANIAL NERVES     CN II   Visual fields full to confrontation.     CN III, IV, VI   Pupils are equal, round, and reactive to light.  Extraocular motions are normal.     CN VII   Facial expression full, symmetric.     CN VIII   Hearing: intact    CN XI   CN XI normal.     CN XII   CN XII normal.     MOTOR EXAM   Muscle bulk: normal  Overall muscle tone: normal      Significant Labs: All pertinent lab results from the past 24 hours have been reviewed.    Significant Studies: I have reviewed all pertinent imaging results/findings within the past 24 hours.

## 2024-08-23 NOTE — PLAN OF CARE
[facility] ([facility])  Initial Discharge Assessment       Primary Care Provider: Emily Dumont MD    Admission Diagnosis: Seizure-like activity [R56.9]    Admission Date: 8/22/2024  Expected Discharge Date:     Transition of Care Barriers: (P) None    Payor: AET[employer] / Plan: [employer] OPEN ACCESS / Product Type: HMO /     Extended Emergency Contact Information  Primary Emergency Contact: Jules Richards   [place]  Mobile Phone: [phone]  Relation: Spouse    Discharge Plan A: (P) Home with family  Discharge Plan B: (P) Home with family, Home Health      [id number] - [place], [place] - [address]  [place] [address]  Phone: [phone] Fax: [phone]      Initial Assessment (most recent)       Adult Discharge Assessment - 08/23/24 1221          Discharge Assessment    Assessment Type Discharge Planning Assessment (P)      Confirmed/corrected address, phone number and insurance Yes (P)      Confirmed Demographics Correct on Facesheet (P)      Source of Information patient;family (P)      When was your last doctors appointment? 05/15/24 (P)      Communicated ANN with patient/caregiver Date not available/Unable to determine (P)      Reason For Admission epilepsy monitoring (P)      People in Home spouse (P)      Do you expect to return to your current living situation? Yes (P)      Do you have help at home or someone to help you manage your care at home? Yes (P)      Who are your caregiver(s) and their phone number(s)?  Jules Richards [phone] (P)      Prior to hospitilization cognitive status: Alert/Oriented (P)      Current cognitive status: Alert/Oriented (P)      Walking or Climbing Stairs Difficulty no (P)      Dressing/Bathing Difficulty yes (P)      Dressing/Bathing bathing difficulty, requires equipment (P)      Dressing/Bathing Management shower chair (P)      Home Layout Able to live on 1st floor (P)      Equipment Currently Used at Home  bedside commode;cane, straight;shower chair (P)      Readmission within 30 days? No (P)      Patient currently being followed by outpatient case management? No (P)      Do you currently have service(s) that help you manage your care at home? No (P)      Do you take prescription medications? Yes (P)      Do you have prescription coverage? Yes (P)      Coverage [employer] (P)      Do you have any problems affording any of your prescribed medications? No (P)      Is the patient taking medications as prescribed? yes (P)      Who is going to help you get home at discharge?  Jules (P)      How do you get to doctors appointments? family or friend will provide;car, drives self (P)      Are you on dialysis? No (P)      Do you take coumadin? No (P)      Discharge Plan A Home with family (P)      Discharge Plan B Home with family;Home Health (P)      DME Needed Upon Discharge  none (P)      Discharge Plan discussed with: Patient;Spouse/sig other (P)      Transition of Care Barriers None (P)         Physical Activity    On average, how many days per week do you engage in moderate to strenuous exercise (like a brisk walk)? 2 days (P)      On average, how many minutes do you engage in exercise at this level? 20 min (P)         Financial Resource Strain    How hard is it for you to pay for the very basics like food, housing, medical care, and heating? Not very hard (P)         Housing Stability    In the last 12 months, was there a time when you were not able to pay the mortgage or rent on time? No (P)      At any time in the past 12 months, were you homeless or living in a shelter (including now)? No (P)         Transportation Needs    Has the lack of transportation kept you from medical appointments, meetings, work or from getting things needed for daily living? No (P)         Food Insecurity    Within the past 12 months, you worried that your food would run out before you got the money to buy more. Sometimes true (P)       Within the past 12 months, the food you bought just didn't last and you didn't have money to get more. Sometimes true (P)         Stress    Do you feel stress - tense, restless, nervous, or anxious, or unable to sleep at night because your mind is troubled all the time - these days? To some extent (P)         Social Isolation    How often do you feel lonely or isolated from those around you?  Rarely (P)         Alcohol Use    Q1: How often do you have a drink containing alcohol? Monthly or less (P)      Q2: How many drinks containing alcohol do you have on a typical day when you are drinking? 1 or 2 (P)      Q3: How often do you have six or more drinks on one occasion? Never (P)         Utilities    In the past 12 months has the electric, gas, oil, or water company threatened to shut off services in your home? No (P)         Health Literacy    How often do you need to have someone help you when you read instructions, pamphlets, or other written material from your doctor or pharmacy? Never (P)         OTHER    Name(s) of People in Home  Jules (P)                    Patient resides with her spouse Jules in a [facility] with 4 steps.  Patient is independent with ambulation and ADL's, but does have a SC, bedside commode and single point cane at home.  Patient does not have HH services.  Patient is not on HD or Coumadin.  Patient will discharge home with family when cleared.  Her  will provide support and transportation.      Discharge Plan A and Plan B have been determined by review of patient's clinical status, future medical and therapeutic needs, and coverage/benefits for post-acute care in coordination with multidisciplinary team members.    Natasha, THIERRY  [facility]  [phone]

## 2024-08-23 NOTE — PLAN OF CARE
..POC and meds reviewed with patient. Questions encouraged and answered. Patient verbalized understanding. V/S stable throughout shift; please see flowsheets for V/S information and full assessment data. NAEO. Siderails up x 3 & padded, bed locked in lowest position, call bell in reach, O2 & suction at bedside, TM.

## 2024-08-23 NOTE — HOSPITAL COURSE
8/22>8/23: Admit to EMU, home Levetiracetam, Lamotrigine held on admission. No full typical events captured since admission per significant other at bedside, EEG normal. Continue close vEEG off all anti-seizure medications for event capture.   8/23>8/24: Patient had event last night of asystole last night with episode. See EEG report from today for details. No seizure seen.   8/24>8/25: Patient describes feeling more anxious and having some left shoulder/chest pain. EEG has remained normal with no sign of seizure activity or epileptiform discharges.   8/25>8/26: EEG WNL, PBs overnight for anxiety with no EEG correlate. EEG discontinued. No indication to resume AEDs.     See EEG reports for details.
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
## 2024-08-24 PROBLEM — I46.9 ASYSTOLE: Status: ACTIVE | Noted: 2024-08-24

## 2024-08-24 PROBLEM — I44.2 COMPLETE AV BLOCK: Status: ACTIVE | Noted: 2024-08-24

## 2024-08-24 LAB
ALBUMIN SERPL BCP-MCNC: 3.6 G/DL (ref 3.5–5.2)
ALP SERPL-CCNC: 115 U/L (ref 55–135)
ALT SERPL W/O P-5'-P-CCNC: 38 U/L (ref 10–44)
ANION GAP SERPL CALC-SCNC: 9 MMOL/L (ref 8–16)
ASCENDING AORTA: 2.06 CM
AST SERPL-CCNC: 23 U/L (ref 10–40)
AV INDEX (PROSTH): 0.66
AV MEAN GRADIENT: 5 MMHG
AV PEAK GRADIENT: 10 MMHG
AV VALVE AREA BY VELOCITY RATIO: 2.03 CM²
AV VALVE AREA: 1.83 CM²
AV VELOCITY RATIO: 0.73
BACTERIA #/AREA URNS AUTO: ABNORMAL /HPF
BASOPHILS # BLD AUTO: 0.06 K/UL (ref 0–0.2)
BASOPHILS NFR BLD: 0.6 % (ref 0–1.9)
BILIRUB SERPL-MCNC: 0.3 MG/DL (ref 0.1–1)
BILIRUB UR QL STRIP: NEGATIVE
BNP SERPL-MCNC: <10 PG/ML (ref 0–99)
BSA FOR ECHO PROCEDURE: 1.96 M2
BUN SERPL-MCNC: 12 MG/DL (ref 6–20)
CALCIUM SERPL-MCNC: 9.7 MG/DL (ref 8.7–10.5)
CHLORIDE SERPL-SCNC: 107 MMOL/L (ref 95–110)
CLARITY UR REFRACT.AUTO: ABNORMAL
CO2 SERPL-SCNC: 24 MMOL/L (ref 23–29)
COLOR UR AUTO: COLORLESS
CREAT SERPL-MCNC: 0.9 MG/DL (ref 0.5–1.4)
CRP SERPL-MCNC: 4.6 MG/L (ref 0–8.2)
CV ECHO LV RWT: 0.65 CM
DIFFERENTIAL METHOD BLD: NORMAL
DOP CALC AO PEAK VEL: 1.57 M/S
DOP CALC AO VTI: 25.9 CM
DOP CALC LVOT AREA: 2.8 CM2
DOP CALC LVOT DIAMETER: 1.88 CM
DOP CALC LVOT PEAK VEL: 1.15 M/S
DOP CALC LVOT STROKE VOLUME: 47.31 CM3
DOP CALCLVOT PEAK VEL VTI: 17.05 CM
E WAVE DECELERATION TIME: 110.22 MSEC
E/A RATIO: 0.54
E/E' RATIO: 5 M/S
ECHO LV POSTERIOR WALL: 0.97 CM (ref 0.6–1.1)
EOSINOPHIL # BLD AUTO: 0.1 K/UL (ref 0–0.5)
EOSINOPHIL NFR BLD: 0.8 % (ref 0–8)
ERYTHROCYTE [DISTWIDTH] IN BLOOD BY AUTOMATED COUNT: 12.8 % (ref 11.5–14.5)
ERYTHROCYTE [SEDIMENTATION RATE] IN BLOOD BY PHOTOMETRIC METHOD: 22 MM/HR (ref 0–36)
EST. GFR  (NO RACE VARIABLE): >60 ML/MIN/1.73 M^2
FRACTIONAL SHORTENING: 33 % (ref 28–44)
GLUCOSE SERPL-MCNC: 122 MG/DL (ref 70–110)
GLUCOSE UR QL STRIP: NEGATIVE
HCT VFR BLD AUTO: 45.4 % (ref 37–48.5)
HGB BLD-MCNC: 15.3 G/DL (ref 12–16)
HGB UR QL STRIP: ABNORMAL
HIV 1+2 AB+HIV1 P24 AG SERPL QL IA: NORMAL
IMM GRANULOCYTES # BLD AUTO: 0.03 K/UL (ref 0–0.04)
IMM GRANULOCYTES NFR BLD AUTO: 0.3 % (ref 0–0.5)
INTERVENTRICULAR SEPTUM: 0.86 CM (ref 0.6–1.1)
IVRT: 105.61 MSEC
KETONES UR QL STRIP: NEGATIVE
LA MAJOR: 4.89 CM
LA MINOR: 4.89 CM
LA WIDTH: 2.87 CM
LEFT ATRIUM SIZE: 2.23 CM
LEFT ATRIUM VOLUME INDEX MOD: 16.2 ML/M2
LEFT ATRIUM VOLUME INDEX: 14.1 ML/M2
LEFT ATRIUM VOLUME MOD: 30.55 CM3
LEFT ATRIUM VOLUME: 26.6 CM3
LEFT INTERNAL DIMENSION IN SYSTOLE: 2 CM (ref 2.1–4)
LEFT VENTRICLE DIASTOLIC VOLUME INDEX: 11.47 ML/M2
LEFT VENTRICLE DIASTOLIC VOLUME: 21.68 ML
LEFT VENTRICLE MASS INDEX: 38 G/M2
LEFT VENTRICLE SYSTOLIC VOLUME INDEX: 3.7 ML/M2
LEFT VENTRICLE SYSTOLIC VOLUME: 6.98 ML
LEFT VENTRICULAR INTERNAL DIMENSION IN DIASTOLE: 3 CM (ref 3.5–6)
LEFT VENTRICULAR MASS: 71.88 G
LEUKOCYTE ESTERASE UR QL STRIP: ABNORMAL
LV LATERAL E/E' RATIO: 4.5 M/S
LV SEPTAL E/E' RATIO: 5.63 M/S
LYMPHOCYTES # BLD AUTO: 3.2 K/UL (ref 1–4.8)
LYMPHOCYTES NFR BLD: 30 % (ref 18–48)
MCH RBC QN AUTO: 30.2 PG (ref 27–31)
MCHC RBC AUTO-ENTMCNC: 33.7 G/DL (ref 32–36)
MCV RBC AUTO: 90 FL (ref 82–98)
MICROSCOPIC COMMENT: ABNORMAL
MONOCYTES # BLD AUTO: 0.7 K/UL (ref 0.3–1)
MONOCYTES NFR BLD: 6.7 % (ref 4–15)
MV PEAK A VEL: 0.83 M/S
MV PEAK E VEL: 0.45 M/S
MV STENOSIS PRESSURE HALF TIME: 31.96 MS
MV VALVE AREA P 1/2 METHOD: 6.88 CM2
NEUTROPHILS # BLD AUTO: 6.6 K/UL (ref 1.8–7.7)
NEUTROPHILS NFR BLD: 61.6 % (ref 38–73)
NITRITE UR QL STRIP: NEGATIVE
NRBC BLD-RTO: 0 /100 WBC
OHS QRS DURATION: 120 MS
OHS QRS DURATION: 126 MS
OHS QTC CALCULATION: 451 MS
OHS QTC CALCULATION: 460 MS
PH UR STRIP: 5 [PH] (ref 5–8)
PLATELET # BLD AUTO: 267 K/UL (ref 150–450)
PMV BLD AUTO: 10.5 FL (ref 9.2–12.9)
POTASSIUM SERPL-SCNC: 4.2 MMOL/L (ref 3.5–5.1)
PROT SERPL-MCNC: 6.8 G/DL (ref 6–8.4)
PROT UR QL STRIP: NEGATIVE
RA MAJOR: 4.39 CM
RA PRESSURE ESTIMATED: 3 MMHG
RA WIDTH: 2.83 CM
RBC # BLD AUTO: 5.07 M/UL (ref 4–5.4)
RBC #/AREA URNS AUTO: 1 /HPF (ref 0–4)
SINUS: 2.84 CM
SODIUM SERPL-SCNC: 140 MMOL/L (ref 136–145)
SP GR UR STRIP: 1.01 (ref 1–1.03)
SQUAMOUS #/AREA URNS AUTO: 8 /HPF
STJ: 2.28 CM
TDI LATERAL: 0.1 M/S
TDI SEPTAL: 0.08 M/S
TDI: 0.09 M/S
TRICUSPID ANNULAR PLANE SYSTOLIC EXCURSION: 1.85 CM
TSH SERPL DL<=0.005 MIU/L-ACNC: 2.8 UIU/ML (ref 0.4–4)
URN SPEC COLLECT METH UR: ABNORMAL
WBC # BLD AUTO: 10.63 K/UL (ref 3.9–12.7)
WBC #/AREA URNS AUTO: 2 /HPF (ref 0–5)
Z-SCORE OF LEFT VENTRICULAR DIMENSION IN END DIASTOLE: -5.67
Z-SCORE OF LEFT VENTRICULAR DIMENSION IN END SYSTOLE: -3.86

## 2024-08-24 PROCEDURE — 20000000 HC ICU ROOM

## 2024-08-24 PROCEDURE — C1751 CATH, INF, PER/CENT/MIDLINE: HCPCS

## 2024-08-24 PROCEDURE — 85652 RBC SED RATE AUTOMATED: CPT | Performed by: STUDENT IN AN ORGANIZED HEALTH CARE EDUCATION/TRAINING PROGRAM

## 2024-08-24 PROCEDURE — 81001 URINALYSIS AUTO W/SCOPE: CPT | Performed by: STUDENT IN AN ORGANIZED HEALTH CARE EDUCATION/TRAINING PROGRAM

## 2024-08-24 PROCEDURE — 95720 EEG PHY/QHP EA INCR W/VEEG: CPT | Mod: ,,, | Performed by: PSYCHIATRY & NEUROLOGY

## 2024-08-24 PROCEDURE — 84443 ASSAY THYROID STIM HORMONE: CPT | Performed by: STUDENT IN AN ORGANIZED HEALTH CARE EDUCATION/TRAINING PROGRAM

## 2024-08-24 PROCEDURE — 84484 ASSAY OF TROPONIN QUANT: CPT | Performed by: STUDENT IN AN ORGANIZED HEALTH CARE EDUCATION/TRAINING PROGRAM

## 2024-08-24 PROCEDURE — 94761 N-INVAS EAR/PLS OXIMETRY MLT: CPT

## 2024-08-24 PROCEDURE — 93005 ELECTROCARDIOGRAM TRACING: CPT

## 2024-08-24 PROCEDURE — 27200667 HC PACEMAKER, TEMPORARY MONITORING, PER SHIFT

## 2024-08-24 PROCEDURE — 93010 ELECTROCARDIOGRAM REPORT: CPT | Mod: ,,, | Performed by: INTERNAL MEDICINE

## 2024-08-24 PROCEDURE — 25000003 PHARM REV CODE 250

## 2024-08-24 PROCEDURE — 85025 COMPLETE CBC W/AUTO DIFF WBC: CPT | Performed by: STUDENT IN AN ORGANIZED HEALTH CARE EDUCATION/TRAINING PROGRAM

## 2024-08-24 PROCEDURE — 25000003 PHARM REV CODE 250: Performed by: STUDENT IN AN ORGANIZED HEALTH CARE EDUCATION/TRAINING PROGRAM

## 2024-08-24 PROCEDURE — 80053 COMPREHEN METABOLIC PANEL: CPT | Performed by: STUDENT IN AN ORGANIZED HEALTH CARE EDUCATION/TRAINING PROGRAM

## 2024-08-24 PROCEDURE — 95714 VEEG EA 12-26 HR UNMNTR: CPT

## 2024-08-24 PROCEDURE — 02HV33Z INSERTION OF INFUSION DEVICE INTO SUPERIOR VENA CAVA, PERCUTANEOUS APPROACH: ICD-10-PCS | Performed by: INTERNAL MEDICINE

## 2024-08-24 PROCEDURE — 25500020 PHARM REV CODE 255: Performed by: INTERNAL MEDICINE

## 2024-08-24 PROCEDURE — 99233 SBSQ HOSP IP/OBS HIGH 50: CPT | Mod: ,,, | Performed by: PSYCHIATRY & NEUROLOGY

## 2024-08-24 PROCEDURE — 36556 INSERT NON-TUNNEL CV CATH: CPT

## 2024-08-24 PROCEDURE — 86140 C-REACTIVE PROTEIN: CPT | Performed by: STUDENT IN AN ORGANIZED HEALTH CARE EDUCATION/TRAINING PROGRAM

## 2024-08-24 PROCEDURE — 83880 ASSAY OF NATRIURETIC PEPTIDE: CPT | Performed by: STUDENT IN AN ORGANIZED HEALTH CARE EDUCATION/TRAINING PROGRAM

## 2024-08-24 PROCEDURE — 87389 HIV-1 AG W/HIV-1&-2 AB AG IA: CPT | Performed by: STUDENT IN AN ORGANIZED HEALTH CARE EDUCATION/TRAINING PROGRAM

## 2024-08-24 RX ORDER — SODIUM CHLORIDE 9 MG/ML
INJECTION, SOLUTION INTRAVENOUS CONTINUOUS
Status: DISCONTINUED | OUTPATIENT
Start: 2024-08-24 | End: 2024-08-27 | Stop reason: HOSPADM

## 2024-08-24 RX ORDER — NITROGLYCERIN 0.4 MG/1
0.4 TABLET SUBLINGUAL EVERY 5 MIN PRN
Status: DISPENSED | OUTPATIENT
Start: 2024-08-25 | End: 2024-08-25

## 2024-08-24 RX ADMIN — NITROGLYCERIN 0.4 MG: 0.4 TABLET, ORALLY DISINTEGRATING SUBLINGUAL at 11:08

## 2024-08-24 RX ADMIN — ATORVASTATIN CALCIUM 40 MG: 40 TABLET, FILM COATED ORAL at 09:08

## 2024-08-24 RX ADMIN — CHOLECALCIFEROL TAB 125 MCG (5000 UNIT) 5000 UNITS: 125 TAB at 09:08

## 2024-08-24 RX ADMIN — SODIUM CHLORIDE: 9 INJECTION, SOLUTION INTRAVENOUS at 07:08

## 2024-08-24 RX ADMIN — HUMAN ALBUMIN MICROSPHERES AND PERFLUTREN 0.11 MG: 10; .22 INJECTION, SOLUTION INTRAVENOUS at 11:08

## 2024-08-24 RX ADMIN — HYDROXYZINE PAMOATE 50 MG: 25 CAPSULE ORAL at 08:08

## 2024-08-24 RX ADMIN — ONDANSETRON 8 MG: 8 TABLET, ORALLY DISINTEGRATING ORAL at 07:08

## 2024-08-24 RX ADMIN — HYDROXYZINE PAMOATE 50 MG: 25 CAPSULE ORAL at 09:08

## 2024-08-24 NOTE — ASSESSMENT & PLAN NOTE
58yoF without much cardiac history but with a hx of syncopal events (vs seizures) for the past 12-18 months admitted to the epillepsy monitoring unit for evaluation of these episodes. She had an event typical of some her syncopal events ( states she has different quality syncopal events) where she was noted to have sinus rhythm with high grade AV block. She had about 8 seconds of just P waves with no AV conduction. She denies any chest pain at baseline but occasionally has some chest pain after these episodes. There was some slowing of her sinus rhythm (all 1:1 AV conduction) prior to the event caught on telemetry, but she was resting in bed when this occurred. Some events happen after using the restroom, but some are completely random while at rest.     Baseline NSR with LBBB, QRS around 120ms  Prior Holter in 2023 with mostly SR, some ectopy, and a run of AT  TTE in 2023 with preserved EF  Not on AV yves blocking meds  No previous intra-cardiac procedures  Troponin negative    Plan:  - TVP in place. Initial threshold 0.4 mA. Set to 10 mA output, backup rate 40 bpm  - Continuous tele  - EP consult for consideration of EPS, possible PPM  - Thyroid panel, basic inflammatory markers, BNP

## 2024-08-24 NOTE — CARE UPDATE
"RAPID RESPONSE NURSE PROACTIVE ROUNDING NOTE       Time of Visit:     Admit Date: 2024  LOS: 1  Code Status: Full Code   Date of Visit: 2024  : 1965  Age: 58 y.o.  Sex: female  Race: White  Bed: 65542/75261 A:   MRN: [id number]  Was the patient discharged from an ICU this admission? No   Was the patient discharged from a PACU within last 24 hours? No   Did the patient receive conscious sedation/general anesthesia in last 24 hours? No  Was the patient in the ED within the past 24 hours? No  Was the patient on NIPPV within the past 24 hours? No   Attending Physician: Quang Mcknight MD  Primary Service: [facility]   Time spent at the bedside: 15 -30 min    SITUATION    Notified by charge RN via phone call.  Reason for alert: 6 sec pause noted per telemetry  Called to evaluate the patient for Dysrhythmia.    BACKGROUND     Why is the patient in the hospital?: Seizure-like activity    Patient has a past medical history of Intractable epilepsy without status epilepticus and Seizures.    Last Vitals:  Temp: 97.9 °F (36.6 °C) ( 162)  Pulse: 85 ( 1659)  Resp: 18 ( 162)  BP: 114/63 ( 162)  SpO2: 97 % (1624)    24 Hours Vitals Range:  Temp:  [97.9 °F (36.6 °C)-98.5 °F (36.9 °C)]   Pulse:  [68-85]   Resp:  [16-18]   BP: (109-117)/(60-69)   SpO2:  [95 %-97 %]     Labs:  Recent Labs     24  1255   WBC 8.05   HGB 15.6   HCT 46.4          Recent Labs     24  1255 24    140   K 3.9 4.2    105   CO2 26 26   BUN 11 14   CREATININE 1.1 1.2   * 122*   MG  --  2.1        No results for input(s): "PH", "PCO2", "PO2", "HCO3", "POCSATURATED", "BE" in the last 72 hours.     ASSESSMENT      Physical Exam  Pt awake alert talkative. A&O x 4. Very pleasant individual.  Duing and post episode, pt denies CP, SOB, Dizziness.  Remaining VS WNL. Pt does remember a previous episode, approximately 2 weeks ago, that she did have squeezing " chest pressure for a few minutes, eventually resolved. Review of Airstrip showed  6.5 second episode of P waves only, no QRS, CHB , followed by questionable VTach/Atrial tach. Fortunately her BP was not affected.  Cardiology fellow at bedside to evaluate. Troponin was ordered and results were negative, <0.006. Pt with new left BBB starting 6/2/24.  Per Cardiology, Decision made to transfer to ICU for TVP.    INTERVENTIONS    The patient was seen for Cardiac problem. Staff concerns included CHB. The following interventions were performed: ICU transfer.    RECOMMENDATIONS    ICU transfer    PROVIDER ESCALATION    Yes/No  Yes    Orders received and case discussed with Dr. Gillies .    Disposition: Tx in ICU bed 59439.    FOLLOW-UP    Charge RN, Ne  updated on plan of care. Instructed to call the Rapid Response Nurse, Izzy Elise RN at [phone] for additional questions or concerns.
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
## 2024-08-24 NOTE — PLAN OF CARE
SICU PLAN OF CARE    Dx: Complete AV block    Goals of Care: MAP >65, SBP <180    Vital Signs (last 12 hours):   Temp:  [98.3 °F (36.8 °C)-98.5 °F (36.9 °C)]   Pulse:  [75-94]   Resp:  [2-54]   BP: ()/(63-92)   SpO2:  [91 %-97 %]      Neuro: AAOx4, Follows commands , and Moves all extremities spontaneously     Cardiac: normal sinus rhythm, backup paced @40    Respiratory: Room Air    Urine Output: External Catheter  150 mL/shift    Diet: Regular     Labs/Accuchecks: labs reviewed with MD    Skin:  No skin breakdown noted this shift.  Patient turned q2h, bony prominences protected, and mattress inflated/working correctly. All wounds/incisions per documentation.  Bradford Score: 21. If Bradford Score is 16 or less, complete 4EYES note each shift.    Shift Events:  Pt admitted to SICU. Dr. Gillies inserted TVP at bedside.  See flowsheet for further assessment/details.  Family updated on current condition/plan of care, questions answered, and emotional support provided.  MD updated on current condition, vitals, labs, and gtts.        Nurses Note -- 4 Eyes  8/24/2024   5:46 AM      Skin assessed during: Q Shift  [x] No Altered Skin Integrity Present    []Prevention Measures Documented  [] Yes- Altered Skin Integrity Present or Discovered   [] LDA Added if Not in Epic (Describe Wound)   [] New Altered Skin Integrity was Present on Admit and Documented in LDA   [] Wound Image Taken  Wound Care Consulted? No  Attending Nurse:  Jennifer Bustillo RN/Staff Member:  Chelsea HUTSON

## 2024-08-24 NOTE — PROCEDURES
"Vesna Richards is a 58 y.o. female patient.    Temp: 98.3 °F (36.8 °C) (08/23/24 2301)  Pulse: 87 (08/24/24 0115)  Resp: 20 (08/24/24 0115)  BP: (!) 161/75 (08/24/24 0100)  SpO2: (!) 92 % (08/24/24 0115)  Weight: 82.4 kg (181 lb 10.5 oz) (08/23/24 2307)  Height: 5' 3" (160 cm) (08/22/24 1905)       Central Line    Date/Time: 8/24/2024 2:11 AM    Performed by: Gillies, Connor M, DO  Authorized by: Gillies, Connor M, DO    Location procedure was performed:  [facility] SUR[facility] ICU (SICU)  Pre-operative diagnosis:  High grade AV block  Consent Done ?:  Yes  Time out complete?: Verified correct patient, procedure, equipment, staff, and site/side    Indications:  Vascular access  Anesthesia:  Local infiltration  Local anesthetic:  Lidocaine 1% without epinephrine  Preparation:  Skin prepped with ChloraPrep  Skin prep agent dried: Skin prep agent completely dried prior to procedure    Sterile barriers: All five maximal sterile barriers used - gloves, gown, cap, mask and large sterile sheet    Hand hygiene: Hand hygiene performed immediately prior to central venous catheter insertion    Location:  Right internal jugular  Catheter type:  Cordis  Catheter size:  6 Fr  Ultrasound guidance: Yes    Vessel Caliber:  Medium and small   patent  Comprressibility:  Normal  Needle advanced into vessel with real time ultrasound guidance.    Guidewire confirmed in vessel.    Steril sheath on probe.    Sterile gel used.  Manometry: Yes    Number of attempts:  1  Securement:  Line sutured, chlorhexidine patch, sterile dressing applied and blood return through all ports  XRay:  Placement verified by x-ray  Adverse Events:  None    A 6 Fr Cordis was placed in RIJ without issues. A 5 Fr TVP was subsequently advanced through Cordis. It was advanced easily and without resistance until there was capture. The patient was not tolerating being under the sterile drape due to nausea so we sterilely dressed the TVP to let her rest out from under the " tylor. At this time threshold was found to be 0.4 mA.    Settings:  Output 10 mA  Backup rate 40  Patient currently sinus with 1:1 AV conduction with a rate in the 70s-80s and not using TVP pacing.     8/24/2024
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
## 2024-08-24 NOTE — NURSING
Pt was transferred to SICU via bed , Spouse with pt, all belongings with spouse. Pt without complaints at this time .

## 2024-08-24 NOTE — SUBJECTIVE & OBJECTIVE
"Past Medical History:   Diagnosis Date    Intractable epilepsy without status epilepticus 01/10/2024    Seizures        Past Surgical History:   Procedure Laterality Date    BREAST BIOPSY       SECTION         Review of patient's allergies indicates:   Allergen Reactions    Clindamycin Other (See Comments)     Pt state gives her the "grandma"seizure.    Penicillin g Anaphylaxis    Penicillins Shortness Of Breath and Nausea And Vomiting       No current facility-administered medications on file prior to encounter.     Current Outpatient Medications on File Prior to Encounter   Medication Sig    atorvastatin (LIPITOR) 40 MG tablet TAKE 1 TABLET BY MOUTH EVERY DAY    hydrOXYzine pamoate (VISTARIL) 50 MG Cap Take 1 capsule (50 mg total) by mouth 2 (two) times a day.    lamoTRIgine (LAMICTAL) 150 MG Tab Take 1 tablet (150 mg total) by mouth once daily.    levETIRAcetam (KEPPRA) 750 MG Tab Take 1 tablet (750 mg total) by mouth 2 (two) times daily.    multivitamin with minerals tablet Take 1 tablet by mouth once daily.    vitamin D (VITAMIN D3) 1000 units Tab Take 5,000 Units by mouth once daily.     Family History       Problem Relation (Age of Onset)    Ovarian cancer Sister, Sister          Tobacco Use    Smoking status: Every Day     Current packs/day: 1.50     Average packs/day: 2.2 packs/day for 82.6 years (179.5 ttl pk-yrs)     Types: Cigarettes     Start date:     Smokeless tobacco: Not on file   Substance and Sexual Activity    Alcohol use: Not Currently    Drug use: Never    Sexual activity: Yes     Partners: Male     Review of Systems   Constitutional: Negative for diaphoresis and fever.   Cardiovascular:  Negative for chest pain, dyspnea on exertion, leg swelling, near-syncope, orthopnea, palpitations, paroxysmal nocturnal dyspnea and syncope.   Respiratory:  Negative for cough and shortness of breath.    Gastrointestinal:  Negative for abdominal pain, diarrhea, nausea and vomiting.   Neurological:  " Negative for light-headedness.   Psychiatric/Behavioral:  Negative for altered mental status and substance abuse.      Objective:     Vital Signs (Most Recent):  Temp: 98.2 °F (36.8 °C) (08/24/24 0715)  Pulse: 88 (08/24/24 1030)  Resp: (!) 42 (08/24/24 0847)  BP: 120/72 (08/24/24 1030)  SpO2: (!) 93 % (08/24/24 1030) Vital Signs (24h Range):  Temp:  [97.9 °F (36.6 °C)-98.5 °F (36.9 °C)] 98.2 °F (36.8 °C)  Pulse:  [75-94] 88  Resp:  [2-54] 42  SpO2:  [91 %-97 %] 93 %  BP: ()/(58-92) 120/72       Weight: 82.4 kg (181 lb 10.5 oz)  Body mass index is 32.18 kg/m².    SpO2: (!) 93 %        Physical Exam  Constitutional:       General: She is not in acute distress.     Appearance: Normal appearance. She is well-developed.   HENT:      Head: Normocephalic and atraumatic.      Mouth/Throat:      Mouth: Mucous membranes are moist.      Pharynx: Oropharynx is clear.   Eyes:      Extraocular Movements: Extraocular movements intact.      Pupils: Pupils are equal, round, and reactive to light.   Cardiovascular:      Rate and Rhythm: Normal rate and regular rhythm.      Pulses: Normal pulses.      Heart sounds: Normal heart sounds.   Pulmonary:      Effort: Pulmonary effort is normal. No respiratory distress.      Breath sounds: Normal breath sounds. No wheezing or rales.   Abdominal:      General: Abdomen is flat. There is no distension.      Palpations: Abdomen is soft.      Tenderness: There is no abdominal tenderness. There is no guarding.   Musculoskeletal:         General: No swelling. Normal range of motion.      Cervical back: Normal range of motion and neck supple.      Right lower leg: No edema.      Left lower leg: No edema.   Skin:     General: Skin is warm and dry.   Neurological:      Mental Status: She is alert and oriented to person, place, and time. Mental status is at baseline.   Psychiatric:         Mood and Affect: Mood normal.         Behavior: Behavior normal.            Significant Labs: EP:   Recent  "Labs   Lab 08/22/24  1255 08/23/24 2028 08/24/24  0337    140 140   K 3.9 4.2 4.2    105 107   CO2 26 26 24   * 122* 122*   BUN 11 14 12   CREATININE 1.1 1.2 0.9   CALCIUM 9.4 9.5 9.7   PROT 6.5 6.7 6.8   ALBUMIN 3.5 3.5 3.6   BILITOT 0.3 0.2 0.3   ALKPHOS 113 117 115   AST 17 22 23   ALT 25 39 38   ANIONGAP 9 9 9   WBC 8.05  --  10.63   HGB 15.6  --  15.3   HCT 46.4  --  45.4     --  267   , ABG: No results for input(s): "PH", "PCO2", "HCO3", "POCSATURATED", "BE" in the last 48 hours., Blood Culture: No results for input(s): "LABBLOO" in the last 48 hours., BMP:   Recent Labs   Lab 08/22/24  1255 08/23/24 2028 08/24/24  0337   * 122* 122*    140 140   K 3.9 4.2 4.2    105 107   CO2 26 26 24   BUN 11 14 12   CREATININE 1.1 1.2 0.9   CALCIUM 9.4 9.5 9.7   MG  --  2.1  --    , CMP:   Recent Labs   Lab 08/22/24  1255 08/23/24 2028 08/24/24  0337    140 140   K 3.9 4.2 4.2    105 107   CO2 26 26 24   * 122* 122*   BUN 11 14 12   CREATININE 1.1 1.2 0.9   CALCIUM 9.4 9.5 9.7   PROT 6.5 6.7 6.8   ALBUMIN 3.5 3.5 3.6   BILITOT 0.3 0.2 0.3   ALKPHOS 113 117 115   AST 17 22 23   ALT 25 39 38   ANIONGAP 9 9 9   , CBC:   Recent Labs   Lab 08/22/24  1255 08/24/24  0337   WBC 8.05 10.63   HGB 15.6 15.3   HCT 46.4 45.4    267   , INR: No results for input(s): "INR", "PROTIME" in the last 48 hours., Lipid Panel No results for input(s): "CHOL", "HDL", "LDLCALC", "TRIG", "CHOLHDL" in the last 48 hours.,   Pathology Results  (Last 10 years)      None        , Troponin   Recent Labs   Lab 08/23/24 2028   TROPONINI <0.006   , and All pertinent lab results from the last 24 hours have been reviewed.  "

## 2024-08-24 NOTE — SUBJECTIVE & OBJECTIVE
"Past Medical History:   Diagnosis Date    Intractable epilepsy without status epilepticus 01/10/2024    Seizures        Past Surgical History:   Procedure Laterality Date    BREAST BIOPSY       SECTION         Review of patient's allergies indicates:   Allergen Reactions    Clindamycin Other (See Comments)     Pt state gives her the "grandma"seizure.    Penicillin g Anaphylaxis    Penicillins Shortness Of Breath and Nausea And Vomiting       No current facility-administered medications on file prior to encounter.     Current Outpatient Medications on File Prior to Encounter   Medication Sig    atorvastatin (LIPITOR) 40 MG tablet TAKE 1 TABLET BY MOUTH EVERY DAY    hydrOXYzine pamoate (VISTARIL) 50 MG Cap Take 1 capsule (50 mg total) by mouth 2 (two) times a day.    lamoTRIgine (LAMICTAL) 150 MG Tab Take 1 tablet (150 mg total) by mouth once daily.    levETIRAcetam (KEPPRA) 750 MG Tab Take 1 tablet (750 mg total) by mouth 2 (two) times daily.    multivitamin with minerals tablet Take 1 tablet by mouth once daily.    vitamin D (VITAMIN D3) 1000 units Tab Take 5,000 Units by mouth once daily.     Family History       Problem Relation (Age of Onset)    Ovarian cancer Sister, Sister          Tobacco Use    Smoking status: Every Day     Current packs/day: 1.50     Average packs/day: 2.2 packs/day for 82.6 years (179.5 ttl pk-yrs)     Types: Cigarettes     Start date:     Smokeless tobacco: Not on file   Substance and Sexual Activity    Alcohol use: Not Currently    Drug use: Never    Sexual activity: Yes     Partners: Male     Review of Systems   Constitutional: Negative for diaphoresis and fever.   Cardiovascular:  Negative for chest pain, dyspnea on exertion, leg swelling, near-syncope, orthopnea, palpitations, paroxysmal nocturnal dyspnea and syncope.   Respiratory:  Negative for cough and shortness of breath.    Gastrointestinal:  Negative for abdominal pain, diarrhea, nausea and vomiting.   Neurological:  " Negative for light-headedness.   Psychiatric/Behavioral:  Negative for altered mental status and substance abuse.      Objective:     Vital Signs (Most Recent):  Temp: 98.3 °F (36.8 °C) (08/23/24 2301)  Pulse: 87 (08/24/24 0115)  Resp: 20 (08/24/24 0115)  BP: (!) 161/75 (08/24/24 0100)  SpO2: (!) 92 % (08/24/24 0115) Vital Signs (24h Range):  Temp:  [97.9 °F (36.6 °C)-98.5 °F (36.9 °C)] 98.3 °F (36.8 °C)  Pulse:  [68-94] 87  Resp:  [2-54] 20  SpO2:  [92 %-97 %] 92 %  BP: ()/(60-78) 161/75     Weight: 82.4 kg (181 lb 10.5 oz)  Body mass index is 32.18 kg/m².    SpO2: (!) 92 %       No intake or output data in the 24 hours ending 08/24/24 0238    Lines/Drains/Airways       Central Venous Catheter Line  Duration                  Percutaneous Central Line - Cordis 08/24/24 0029 Internal Jugular Right <1 day              Line  Duration                  Pacer Wires 08/24/24 0044 <1 day              Peripheral Intravenous Line  Duration                  Peripheral IV - Single Lumen 08/23/24 2302 20 G Posterior;Right Hand <1 day         Peripheral IV - Single Lumen 08/23/24 2317 20 G Anterior;Right Forearm <1 day                     Physical Exam  Constitutional:       General: She is not in acute distress.     Appearance: Normal appearance. She is well-developed.   HENT:      Head: Normocephalic and atraumatic.      Mouth/Throat:      Mouth: Mucous membranes are moist.      Pharynx: Oropharynx is clear.   Eyes:      Extraocular Movements: Extraocular movements intact.      Pupils: Pupils are equal, round, and reactive to light.   Cardiovascular:      Rate and Rhythm: Normal rate and regular rhythm.      Pulses: Normal pulses.      Heart sounds: Normal heart sounds.   Pulmonary:      Effort: Pulmonary effort is normal. No respiratory distress.      Breath sounds: Normal breath sounds. No wheezing or rales.   Abdominal:      General: Abdomen is flat. There is no distension.      Palpations: Abdomen is soft.       Tenderness: There is no abdominal tenderness. There is no guarding.   Musculoskeletal:         General: No swelling. Normal range of motion.      Cervical back: Normal range of motion and neck supple.      Right lower leg: No edema.      Left lower leg: No edema.   Skin:     General: Skin is warm and dry.   Neurological:      Mental Status: She is alert and oriented to person, place, and time. Mental status is at baseline.   Psychiatric:         Mood and Affect: Mood normal.         Behavior: Behavior normal.          Significant Labs: All pertinent lab results from the last 24 hours have been reviewed.    Significant Imaging:  Reviewed    6/27/23 72hr Holter:  The tape was adequate (3 days , 0 hours, 0 minutes).     - Predominant rhythm: NSR  - left Bundle Branch Block   - Atrial Tachycardia (AT) 1 episode, 4 beats @ Avg 138 bpm up to 146 bpm  - PAC <0.1 %. Isolated 27. Pairs 1.   - PVC 0.2 %. Isolated 792.   - Patient reported events: 23. Corresponding to NSR, PVC, and ST.    Avg 80 bpm  Min 56 bpm Jul 5 02:37  Max 129 bpm Jul 4 12:15    6/3/23 TTE:  The left ventricle is normal in size with mild concentric hypertrophy and normal systolic function.  The study was difficult due to patient's body habitus.  The estimated ejection fraction is 55%.  Grade I left ventricular diastolic dysfunction.  Normal right ventricular size with normal right ventricular systolic function.  Mild left atrial enlargement.  Normal central venous pressure (3 mmHg).

## 2024-08-24 NOTE — EICU
Nurses Note -- 4 Eyes      8/23/2024   11:03 PM      Skin assessed during: Transfer      [x] No Altered Skin Integrity Present    []Prevention Measures Documented      [] Yes- Altered Skin Integrity Present or Discovered   [] LDA Added if Not in Epic (Describe Wound)   [] New Altered Skin Integrity was Present on Admit and Documented in LDA   [] Wound Image Taken    Wound Care Consulted? No    Attending Nurse:  Jennifer Bustillo RN/Staff Member:   Chelsea

## 2024-08-24 NOTE — SUBJECTIVE & OBJECTIVE
Interval History: see above    Current Facility-Administered Medications   Medication Dose Route Frequency Provider Last Rate Last Admin    0.9%  NaCl infusion   Intravenous Continuous Gillies, Connor M, DO 10 mL/hr at 08/24/24 0800 Rate Verify at 08/24/24 0800    acetaminophen tablet 650 mg  650 mg Oral Q4H PRN Delfino Verma MD   650 mg at 08/23/24 0952    atorvastatin tablet 40 mg  40 mg Oral Daily Delfino Verma MD   40 mg at 08/24/24 0915    cholecalciferol (vitamin D3) 125 mcg (5,000 unit) tablet 5,000 Units  5,000 Units Oral Daily Delfino Verma MD   5,000 Units at 08/24/24 0915    docusate sodium capsule 100 mg  100 mg Oral BID PRN Delfino Verma MD        hydrOXYzine pamoate capsule 50 mg  50 mg Oral BID Delfino Verma MD   50 mg at 08/24/24 0915    LORazepam injection 2 mg  2 mg Intravenous PRN Delfino Verma MD        nicotine 21 mg/24 hr 1 patch  1 patch Transdermal Daily PRN Nicole Guerrero PA-C        ondansetron disintegrating tablet 8 mg  8 mg Oral Q8H PRN Delfino Verma MD        sodium chloride 0.9% flush 10 mL  10 mL Intravenous PRN Delfino Verma MD         Continuous Infusions:   0.9% NaCl   Intravenous Continuous 10 mL/hr at 08/24/24 0800 Rate Verify at 08/24/24 0800       Review of Systems   Constitutional:  Negative for fever.   Respiratory:  Positive for cough.    Neurological:  Negative for seizures.     Objective:     Vital Signs (Most Recent):  Temp: 98.2 °F (36.8 °C) (08/24/24 0715)  Pulse: 86 (08/24/24 0847)  Resp: (!) 42 (08/24/24 0847)  BP: 114/66 (08/24/24 0830)  SpO2: (!) 94 % (08/24/24 0847) Vital Signs (24h Range):  Temp:  [97.9 °F (36.6 °C)-98.5 °F (36.9 °C)] 98.2 °F (36.8 °C)  Pulse:  [72-94] 86  Resp:  [2-54] 42  SpO2:  [91 %-97 %] 94 %  BP: ()/(58-92) 114/66     Weight: 82.4 kg (181 lb 10.5 oz)  Body mass index is 32.18 kg/m².     Physical Exam        Awake, alert, EOMI, no ataxia, speech normal    Significant Labs: BMP:   Recent Labs   Lab 08/22/24  1255  08/23/24 2028 08/24/24  0337   * 122* 122*    140 140   K 3.9 4.2 4.2    105 107   CO2 26 26 24   BUN 11 14 12   CREATININE 1.1 1.2 0.9   CALCIUM 9.4 9.5 9.7   MG  --  2.1  --      CBC:   Recent Labs   Lab 08/22/24 1255 08/24/24  0337   WBC 8.05 10.63   HGB 15.6 15.3   HCT 46.4 45.4    267     CMP:   Recent Labs   Lab 08/22/24 1255 08/23/24 2028 08/24/24  0337   * 122* 122*    140 140   K 3.9 4.2 4.2    105 107   CO2 26 26 24   BUN 11 14 12   CREATININE 1.1 1.2 0.9   CALCIUM 9.4 9.5 9.7   MG  --  2.1  --    PROT 6.5 6.7 6.8   ALBUMIN 3.5 3.5 3.6   BILITOT 0.3 0.2 0.3   ALKPHOS 113 117 115   AST 17 22 23   ALT 25 39 38   ANIONGAP 9 9 9     All pertinent lab results from the past 24 hours have been reviewed.    Significant Studies: I have reviewed and interpreted all pertinent imaging results/findings within the past 24 hours. MRI brain shows right anterior temporal encephalomalacia

## 2024-08-24 NOTE — ASSESSMENT & PLAN NOTE
59 yo F without much cardiac history but with a hx of syncopal events (vs seizures) for the past 12-18 months admitted to the epillepsy monitoring unit for evaluation of these episodes. She had an event typical of some her syncopal events ( states she has different quality syncopal events) where she was noted to have sinus rhythm with high grade AV block.   Tele concerning for phase 4 block.   Baseline NSR with LBBB, QRS around 120ms  Prior Holter in 2023 with mostly SR, some ectopy, and a run of AT  TTE in 2023 with preserved EF  Not on AV yves blocking meds  No previous intra-cardiac procedures  Troponin negative     Plan:    - TVP in place. Threshold 0.4 mA. Set to 10 mA output, backup rate 40 bpm  - Plans for PPM Monday tentatively; NPO MN Sunday   - Continue on Tele  - TTE  pending

## 2024-08-24 NOTE — NURSING TRANSFER
Nursing Transfer Note      8/23/2024   11:13 PM    Nurse giving handoff:Alicia  Nurse receiving handoff:Jennifer    Reason patient is being transferred: stepped up to intensive care    Transfer To: SICU    Transfer via bed    Transfer with monitor connected to O2, cardiac monitoring    Transported by RN    Transfer Vital Signs:  Blood Pressure:144/70  Heart Rate:81  O2:95%  Temperature:98.3  Respirations:31    Order for Tele Monitor? Yes    4eyes on Skin: yes    Medicines sent: N/A    Patient belongings transferred with patient: Yes    Chart send with patient: Yes    Notified: spouse    Upon arrival to floor: cardiac monitor applied, patient oriented to room, call bell in reach, and bed in lowest position
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
## 2024-08-24 NOTE — EICU
Intervention Initiated From:  Bedside    Will intervened regarding:  Time-Out    Comments: Called into room for time out of Cordis/venous pacer placement done by Dr. Gillies. See flowsheet.

## 2024-08-24 NOTE — PROGRESS NOTES
Patient had an event at 18:47 of feeling abnormal, jerking and twitching. EEG is normal, no seizure activity seen. Full EEG report to follow.     However, EKG lead shows 6 seconds of systole following by possible Vtach per telemetry monitor.     Getting 12 lead EKG stat, labs, and contacting EP cardiology. Appreciate their evaluation and treatment plan.     Cardiology to transfer patient. OK to disconnect EEG with gray cable. Leave wires in place since glued to head and technician can remove tomorrow. I will see patient in the morning and discuss EEG with her and make further plans for the seizure medications.

## 2024-08-24 NOTE — PROCEDURES
VIDEO ELECTROENCEPHALOGRAM  REPORT     DATE OF SERVICE:8/23-8/24/24  EEG NUMBER: [id number] -[id number]  REQUESTED BY:  Dr Kelly  LOCATION OF SERVICE:  [facility]              Electroencephalographic (EEG) recording is with electrodes placed according to the International 10-20 placement system.  Thirty two (32) channels of digital signal (sampling rate of 512/sec) including T1 and T2 was simultaneously recorded from the scalp and may include  EKG, EMG, and/or eye monitors.  Recording band pass was 0.1 to 512 hz.  Digital video recording of the patient is simultaneously recorded with the EEG.  The patient is instructed report clinical symptoms which may occur during the recording session.  EEG and video recording is stored and archived in digital format.  Activation procedures which include photic stimulation, hyperventilation and instructing patients to perform simple task are done in selected patients.              The EEG is displayed on a monitor screen and can be reviewed using different montages.  Computer assisted analysis is employed to detect spike and electrographic seizure activity.   The entire record is submitted for computer analysis.  The entire recording is visually reviewed and the times identified by computer analysis as being spikes or seizures are reviewed again.  Compresses spectral analysis (CSA) is also performed on the activity recorded from each individual channel.  This is displayed as a power display of frequencies from 0 to 30 Hz over time.   The CSA is reviewed looking for asymmetries in power between homologous areas of the scalp and then compared with the original EEG recording.                Resermap software was also utilized in the review of this study.  This software suite analyzes the EEG recording in multiple domains.  Coherence and rhythmicity is computed to identify EEG sections which may contain organized seizures.  Each channel undergoes analysis to detect presence of spike and  sharp waves which have special and morphological characteristic of epileptic activity.  The routine EEG recording is converted from spacial into frequency domain.  This is then displayed comparing homologous areas to identify areas of significant asymmetry.  Algorithm to identify non-cortically generated artifact is used to separate eye movement, EMG and other artifact from the EEG.       ELECTROENCEPHALOGRAM:     RECORDING TIMES:  Start on 8/23/24 at 07:00  Stop on 8/23/24 at 21:56  A total of 14 hrs and 54 min of EEG recording was obtained.       EEG FINDINGS  Background:   The background is well organized, symmetric and continuous.  There is a normal anterior to posterior gradient consisting of 5-10 mcV amplitude beta activity in the frontal region and well defined alpha activity in the posterior region.   At maximum alertness, there is a well developed 9-10 Hz posterior dominant rhythm that is symmetric and reactive to eye opening and closure noted.    Activating procedures:   Hyperventilation and photic stimulation are not performed      Sleep:   Transition into stage 1 sleep is characterized by attenuation of the posterior dominant rhythm, bilateral theta activity, and vertex waves.  There is also transition into stage II sleep with symmetric vertex waves, sleep spindles, and k complexes noted.      Epileptiform Abnormalities  None     Seizures/Events:   No epileptic seizures were captured. The patient had two clinical events of concern that were without EEG correlate. One was found to be cardiac in origin with asystole noted.     Event #1 occurred on 8/23/24 att 09:11. The patient was lying on her side and said everything felt like it was vibrating and she didn't feel right. No EEG correlate was seen. No EKG changes were noted at this time.     Event #2 occurred on 8/23/24 18:47. The patient had just gotten up to go to the bathroom and returned to the bed. Upon laying back in bed, her head went back and she  "called out for her . She had heavy breathing, irregular jerking of the limbs, coughing and gagging. She said "help me" and "it's not done" and "I keep going in and out" during the episode and was able to communicate. At the start of the episode, a 6-8 second asystole pause in HR was noted. The heart beat then resumed with bradycardia and then tachycardic. EEG showed no seizure activity. There was some diffuse delta slowing at the end of the asystole.      EKG:   See above for description of EKG during event       Impression:   This is a normal awake and sleep EEG except some slowing with event of asystole.  No epileptiform discharges or seizures were seen. There was one event of feeling like everything was vibrating that was without EEG and EKG correlate.  Another episode was associated with asystole. No seizure activity was seen but EEG showed diffuse slowing at this time likely due to hypoperfusion.     Patient was disconnected from EEG for cardiac cares.      EMU summary  8/22-8/23: normal study no events  8/23: event associated with asystole. No seizure activity captured.   "

## 2024-08-24 NOTE — NURSING
SICU PLAN OF CARE    Dx: Complete AV block    Goals of Care: TVP, EEG continuous    Vital Signs (last 12 hours):   Temp:  [98.2 °F (36.8 °C)-98.4 °F (36.9 °C)]   Pulse:  [77-99]   Resp:  [16-44]   BP: (108-146)/(58-72)   SpO2:  [91 %-95 %]      Neuro: AAOx4, Follows commands , and Moves all extremities spontaneously     Cardiac: normal sinus rhythm  TVP in place. Threshold 0.4 mA. Set to 10 mA output, backup rate 40 bpm. Intrinsic rate of 80s-90s    Respiratory: Room Air spo2 92-95%    Urine Output: Voids Spontaneously  900 mL/shift    Diet: Regular     Labs/Accuchecks: Am labs     Skin:  WDL .  Patient turned q2h, bony prominences protected, and mattress inflated/working correctly.   Bradford Score: 23. If Bradford Score is 16 or less, complete 4EYES note each shift.    Shift Events:  FROY.  See flowsheet for further assessment/details.  Family updated on current condition/plan of care, questions answered, and emotional support provided.  MD updated on current condition, vitals, labs, and gtts.
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
## 2024-08-24 NOTE — PROGRESS NOTES
Pt had an event. Remained awake,alert and oriented. Pt was hyperventilating. O2 sat 97.  at bedside.

## 2024-08-25 LAB
ALBUMIN SERPL BCP-MCNC: 3.5 G/DL (ref 3.5–5.2)
ALP SERPL-CCNC: 108 U/L (ref 55–135)
ALT SERPL W/O P-5'-P-CCNC: 45 U/L (ref 10–44)
ANION GAP SERPL CALC-SCNC: 10 MMOL/L (ref 8–16)
AST SERPL-CCNC: 30 U/L (ref 10–40)
BASOPHILS # BLD AUTO: 0.05 K/UL (ref 0–0.2)
BASOPHILS NFR BLD: 0.5 % (ref 0–1.9)
BILIRUB SERPL-MCNC: 0.6 MG/DL (ref 0.1–1)
BUN SERPL-MCNC: 12 MG/DL (ref 6–20)
CALCIUM SERPL-MCNC: 9.4 MG/DL (ref 8.7–10.5)
CHLORIDE SERPL-SCNC: 107 MMOL/L (ref 95–110)
CO2 SERPL-SCNC: 23 MMOL/L (ref 23–29)
CREAT SERPL-MCNC: 0.9 MG/DL (ref 0.5–1.4)
D DIMER PPP IA.FEU-MCNC: 1.01 MG/L FEU
DIFFERENTIAL METHOD BLD: NORMAL
EOSINOPHIL # BLD AUTO: 0.1 K/UL (ref 0–0.5)
EOSINOPHIL NFR BLD: 0.5 % (ref 0–8)
ERYTHROCYTE [DISTWIDTH] IN BLOOD BY AUTOMATED COUNT: 12.8 % (ref 11.5–14.5)
EST. GFR  (NO RACE VARIABLE): >60 ML/MIN/1.73 M^2
GLUCOSE SERPL-MCNC: 123 MG/DL (ref 70–110)
HCT VFR BLD AUTO: 47.4 % (ref 37–48.5)
HGB BLD-MCNC: 15.7 G/DL (ref 12–16)
IMM GRANULOCYTES # BLD AUTO: 0.03 K/UL (ref 0–0.04)
IMM GRANULOCYTES NFR BLD AUTO: 0.3 % (ref 0–0.5)
LYMPHOCYTES # BLD AUTO: 2.6 K/UL (ref 1–4.8)
LYMPHOCYTES NFR BLD: 23.7 % (ref 18–48)
MCH RBC QN AUTO: 30.3 PG (ref 27–31)
MCHC RBC AUTO-ENTMCNC: 33.1 G/DL (ref 32–36)
MCV RBC AUTO: 91 FL (ref 82–98)
MONOCYTES # BLD AUTO: 0.8 K/UL (ref 0.3–1)
MONOCYTES NFR BLD: 7 % (ref 4–15)
NEUTROPHILS # BLD AUTO: 7.5 K/UL (ref 1.8–7.7)
NEUTROPHILS NFR BLD: 68 % (ref 38–73)
NRBC BLD-RTO: 0 /100 WBC
OHS QRS DURATION: 120 MS
OHS QTC CALCULATION: 459 MS
PLATELET # BLD AUTO: 245 K/UL (ref 150–450)
PMV BLD AUTO: 10.7 FL (ref 9.2–12.9)
POTASSIUM SERPL-SCNC: 4.1 MMOL/L (ref 3.5–5.1)
PROT SERPL-MCNC: 6.6 G/DL (ref 6–8.4)
RBC # BLD AUTO: 5.19 M/UL (ref 4–5.4)
SODIUM SERPL-SCNC: 140 MMOL/L (ref 136–145)
TROPONIN I SERPL DL<=0.01 NG/ML-MCNC: 0.05 NG/ML (ref 0–0.03)
TROPONIN I SERPL DL<=0.01 NG/ML-MCNC: 0.07 NG/ML (ref 0–0.03)
WBC # BLD AUTO: 10.99 K/UL (ref 3.9–12.7)

## 2024-08-25 PROCEDURE — 94761 N-INVAS EAR/PLS OXIMETRY MLT: CPT

## 2024-08-25 PROCEDURE — 25000003 PHARM REV CODE 250: Performed by: STUDENT IN AN ORGANIZED HEALTH CARE EDUCATION/TRAINING PROGRAM

## 2024-08-25 PROCEDURE — 63600175 PHARM REV CODE 636 W HCPCS: Performed by: STUDENT IN AN ORGANIZED HEALTH CARE EDUCATION/TRAINING PROGRAM

## 2024-08-25 PROCEDURE — 25000003 PHARM REV CODE 250

## 2024-08-25 PROCEDURE — 95714 VEEG EA 12-26 HR UNMNTR: CPT

## 2024-08-25 PROCEDURE — 85025 COMPLETE CBC W/AUTO DIFF WBC: CPT | Performed by: STUDENT IN AN ORGANIZED HEALTH CARE EDUCATION/TRAINING PROGRAM

## 2024-08-25 PROCEDURE — 84484 ASSAY OF TROPONIN QUANT: CPT | Performed by: STUDENT IN AN ORGANIZED HEALTH CARE EDUCATION/TRAINING PROGRAM

## 2024-08-25 PROCEDURE — 20000000 HC ICU ROOM

## 2024-08-25 PROCEDURE — 25000242 PHARM REV CODE 250 ALT 637 W/ HCPCS: Performed by: STUDENT IN AN ORGANIZED HEALTH CARE EDUCATION/TRAINING PROGRAM

## 2024-08-25 PROCEDURE — 85379 FIBRIN DEGRADATION QUANT: CPT

## 2024-08-25 PROCEDURE — 99233 SBSQ HOSP IP/OBS HIGH 50: CPT | Mod: ,,, | Performed by: PSYCHIATRY & NEUROLOGY

## 2024-08-25 PROCEDURE — 80053 COMPREHEN METABOLIC PANEL: CPT | Performed by: STUDENT IN AN ORGANIZED HEALTH CARE EDUCATION/TRAINING PROGRAM

## 2024-08-25 RX ORDER — PANTOPRAZOLE SODIUM 40 MG/10ML
40 INJECTION, POWDER, LYOPHILIZED, FOR SOLUTION INTRAVENOUS ONCE
Status: COMPLETED | OUTPATIENT
Start: 2024-08-25 | End: 2024-08-25

## 2024-08-25 RX ORDER — METHOCARBAMOL 500 MG/1
500 TABLET, FILM COATED ORAL 2 TIMES DAILY PRN
Status: DISCONTINUED | OUTPATIENT
Start: 2024-08-25 | End: 2024-08-27 | Stop reason: HOSPADM

## 2024-08-25 RX ORDER — SUCRALFATE 1 G/10ML
1 SUSPENSION ORAL ONCE
Status: COMPLETED | OUTPATIENT
Start: 2024-08-25 | End: 2024-08-25

## 2024-08-25 RX ADMIN — ACETAMINOPHEN 650 MG: 325 TABLET ORAL at 05:08

## 2024-08-25 RX ADMIN — HYDROXYZINE PAMOATE 50 MG: 25 CAPSULE ORAL at 09:08

## 2024-08-25 RX ADMIN — SUCRALFATE 1 G: 1 SUSPENSION ORAL at 01:08

## 2024-08-25 RX ADMIN — PANTOPRAZOLE SODIUM 40 MG: 40 INJECTION, POWDER, LYOPHILIZED, FOR SOLUTION INTRAVENOUS at 01:08

## 2024-08-25 RX ADMIN — METHOCARBAMOL 500 MG: 500 TABLET ORAL at 09:08

## 2024-08-25 RX ADMIN — HYDROXYZINE PAMOATE 50 MG: 25 CAPSULE ORAL at 08:08

## 2024-08-25 RX ADMIN — ATORVASTATIN CALCIUM 40 MG: 40 TABLET, FILM COATED ORAL at 08:08

## 2024-08-25 RX ADMIN — CHOLECALCIFEROL TAB 125 MCG (5000 UNIT) 5000 UNITS: 125 TAB at 08:08

## 2024-08-25 NOTE — HOSPITAL COURSE
Patient admitted to CCU 8/23 for paroxysmal high grade AV block. TVP placed with 10mA output, backuprate 40 bpm. Echo completed 8/24, noted to be poor quality, notable for EF 70%, otherwise largely normal. Dual chamber PPM placed 8/26 without concern or complication. Remained sinus rhythm with 1:1 conduction thereafter. EEG placed on admission for evaluation of her convulsions, which were negative for epileptiform activity. Likely not having seizures per epilepsy team. Home AED's discontinued.     Patient stable to discharge to home 8/27. Will discharge with course of doxycycline for prevention given recent PPM placement. Will follow up in device clinic in one week. Also recommended follow up with neurology.       Constitutional:       General: alert, no apparent distress  HENT:      Head: Normocephalic and atraumatic.   Cardiovascular:      Rate and Rhythm: Normal rate and regular rhythm.      Heart sounds: Normal heart sounds.   Pulmonary:      Effort: Pulmonary effort is normal.      Breath sounds: Normal breath sounds.   Skin:     General: Skin is warm and dry   Neurological:      Mental Status: alert and oriented to person, place, and time.   Psychiatric:         Mood and Affect: Mood normal.         Behavior: Behavior normal.

## 2024-08-25 NOTE — SUBJECTIVE & OBJECTIVE
"Interval History:   No acute events overnight. Tele reviewed with Hr 70-80, no acute events.   Patient emotional this AM, anxious about her her own illness and "want God to take her."       Review of Systems   Constitutional: Negative for diaphoresis and fever.   Cardiovascular:  Negative for chest pain, dyspnea on exertion, leg swelling, near-syncope, orthopnea, palpitations, paroxysmal nocturnal dyspnea and syncope.   Respiratory:  Negative for cough and shortness of breath.    Gastrointestinal:  Negative for abdominal pain, diarrhea, nausea and vomiting.   Neurological:  Negative for light-headedness.   Psychiatric/Behavioral:  Negative for altered mental status and substance abuse.      Objective:     Vital Signs (Most Recent):  Temp: 98.2 °F (36.8 °C) (08/25/24 0705)  Pulse: 85 (08/25/24 0830)  Resp: (!) 38 (08/25/24 0830)  BP: (!) 170/75 (08/25/24 0800)  SpO2: (!) 94 % (08/25/24 0845) Vital Signs (24h Range):  Temp:  [98.1 °F (36.7 °C)-98.6 °F (37 °C)] 98.2 °F (36.8 °C)  Pulse:  [] 85  Resp:  [16-45] 38  SpO2:  [85 %-96 %] 94 %  BP: (120-170)/() 170/75     Weight: 86.2 kg (190 lb)  Body mass index is 33.66 kg/m².     SpO2: (!) 94 %        Physical Exam  Constitutional:       General: She is not in acute distress.     Appearance: Normal appearance. She is well-developed.   HENT:      Head: Normocephalic and atraumatic.      Mouth/Throat:      Mouth: Mucous membranes are moist.      Pharynx: Oropharynx is clear.   Eyes:      Extraocular Movements: Extraocular movements intact.      Pupils: Pupils are equal, round, and reactive to light.   Cardiovascular:      Rate and Rhythm: Normal rate and regular rhythm.      Pulses: Normal pulses.      Heart sounds: Normal heart sounds.   Pulmonary:      Effort: Pulmonary effort is normal. No respiratory distress.      Breath sounds: Normal breath sounds. No wheezing or rales.   Abdominal:      General: Abdomen is flat. There is no distension.      Palpations: " "Abdomen is soft.      Tenderness: There is no abdominal tenderness. There is no guarding.   Musculoskeletal:         General: No swelling. Normal range of motion.      Cervical back: Normal range of motion and neck supple.      Right lower leg: No edema.      Left lower leg: No edema.   Skin:     General: Skin is warm and dry.   Neurological:      Mental Status: She is alert and oriented to person, place, and time. Mental status is at baseline.   Psychiatric:         Mood and Affect: Mood normal.      Comments: Anxious             Significant Labs: EP:   Recent Labs   Lab 08/23/24 2028 08/24/24 0337 08/24/24 0337 08/25/24 0253    140  --  140   K 4.2 4.2  --  4.1    107  --  107   CO2 26 24  --  23   * 122*  --  123*   BUN 14 12  --  12   CREATININE 1.2 0.9  --  0.9   CALCIUM 9.5 9.7  --  9.4   PROT 6.7 6.8  --  6.6   ALBUMIN 3.5 3.6  --  3.5   BILITOT 0.2 0.3  --  0.6   ALKPHOS 117 115  --  108   AST 22 23  --  30   ALT 39 38  --  45*   ANIONGAP 9 9  --  10   WBC  --  10.63  --  10.99   HGB  --  15.3  --  15.7   HCT  --  45.4   < > 47.4   PLT  --  267  --  245    < > = values in this interval not displayed.   , ABG: No results for input(s): "PH", "PCO2", "HCO3", "POCSATURATED", "BE" in the last 48 hours., Blood Culture: No results for input(s): "LABBLOO" in the last 48 hours., BMP:   Recent Labs   Lab 08/23/24 2028 08/24/24 0337 08/25/24 0253   * 122* 123*    140 140   K 4.2 4.2 4.1    107 107   CO2 26 24 23   BUN 14 12 12   CREATININE 1.2 0.9 0.9   CALCIUM 9.5 9.7 9.4   MG 2.1  --   --    , CMP:   Recent Labs   Lab 08/23/24  2028 08/24/24  0337 08/25/24  0253    140 140   K 4.2 4.2 4.1    107 107   CO2 26 24 23   * 122* 123*   BUN 14 12 12   CREATININE 1.2 0.9 0.9   CALCIUM 9.5 9.7 9.4   PROT 6.7 6.8 6.6   ALBUMIN 3.5 3.6 3.5   BILITOT 0.2 0.3 0.6   ALKPHOS 117 115 108   AST 22 23 30   ALT 39 38 45*   ANIONGAP 9 9 10   , CBC:   Recent Labs   Lab " "08/24/24  0337 08/25/24  0253   WBC 10.63 10.99   HGB 15.3 15.7   HCT 45.4 47.4    245   , INR: No results for input(s): "INR", "PROTIME" in the last 48 hours., Lipid Panel No results for input(s): "CHOL", "HDL", "LDLCALC", "TRIG", "CHOLHDL" in the last 48 hours.,   Pathology Results  (Last 10 years)      None        , Troponin   Recent Labs   Lab 08/23/24 2028 08/24/24  2336 08/25/24  0253   TROPONINI <0.006 0.065* 0.050*   , and All pertinent lab results from the last 24 hours have been reviewed.     "

## 2024-08-25 NOTE — PROCEDURES
VIDEO ELECTROENCEPHALOGRAM  REPORT     DATE OF SERVICE:8/24-25/24  EEG NUMBER: [id number] -[id number]  REQUESTED BY:  Dr Kelly  LOCATION OF SERVICE:  [facility]     METHODOLOGY              Electroencephalographic (EEG) recording is with electrodes placed according to the International 10-20 placement system.  Thirty two (32) channels of digital signal (sampling rate of 512/sec) including T1 and T2 was simultaneously recorded from the scalp and may include  EKG, EMG, and/or eye monitors.  Recording band pass was 0.1 to 512 hz.  Digital video recording of the patient is simultaneously recorded with the EEG.  The patient is instructed report clinical symptoms which may occur during the recording session.  EEG and video recording is stored and archived in digital format.  Activation procedures which include photic stimulation, hyperventilation and instructing patients to perform simple task are done in selected patients.              The EEG is displayed on a monitor screen and can be reviewed using different montages.  Computer assisted analysis is employed to detect spike and electrographic seizure activity.   The entire record is submitted for computer analysis.  The entire recording is visually reviewed and the times identified by computer analysis as being spikes or seizures are reviewed again.  Compresses spectral analysis (CSA) is also performed on the activity recorded from each individual channel.  This is displayed as a power display of frequencies from 0 to 30 Hz over time.   The CSA is reviewed looking for asymmetries in power between homologous areas of the scalp and then compared with the original EEG recording.                Facio software was also utilized in the review of this study.  This software suite analyzes the EEG recording in multiple domains.  Coherence and rhythmicity is computed to identify EEG sections which may contain organized seizures.  Each channel undergoes analysis to detect presence of spike and  sharp waves which have special and morphological characteristic of epileptic activity.  The routine EEG recording is converted from spacial into frequency domain.  This is then displayed comparing homologous areas to identify areas of significant asymmetry.  Algorithm to identify non-cortically generated artifact is used to separate eye movement, EMG and other artifact from the EEG.       ELECTROENCEPHALOGRAM:     RECORDING TIMES:  Start on 8/24/24 at 11:33  Stop on 8/25/24 at 07:00  A total of 19 hrs and 22 min of EEG recording was obtained.        EEG FINDINGS  Background:   The background is well organized, symmetric and continuous.  There is a normal anterior to posterior gradient consisting of 5-10 mcV amplitude beta activity in the frontal region and well defined alpha activity in the posterior region.   At maximum alertness, there is a well developed 9-10 Hz posterior dominant rhythm that is symmetric and reactive to eye opening and closure noted.     Activating procedures:   Hyperventilation and photic stimulation are not performed      Sleep:   Transition into stage 1 sleep is characterized by attenuation of the posterior dominant rhythm, bilateral theta activity, and vertex waves.  There is also transition into stage II sleep with symmetric vertex waves, sleep spindles, and k complexes noted.      Epileptiform Abnormalities  None     Seizures/Events:   No epileptic seizures were captured. The patient had clinical events of concern that were without EEG correlate.      Event #1 and #2 occurred on 8/24/24 at 17:06 and 17:21. No EEG correlate was seen. Patient described feeling more emotional and anxious.      Event #3 occurred on 8/24/24 at around 19:15. The patient described feeling panicked, dizzy, nauseated. No EEG correlate was seen.     Event #4 occurred on 8/24/24 at 23:53. Patient described pain and a popping sensation. No EEG correlate was seen.      EKG:   Single lead EKG showed regular rhythm         Impression:   This is a normal awake and sleep EEG.  No epileptiform discharges or seizures were seen. Events of concern as described above were without EEG correlate.        EMU summary  8/22-8/23: normal study no events  8/23: event associated with asystole. No seizure activity captured.   8/24-8/25: normal EEG. Clinical events without EEG correlate.

## 2024-08-25 NOTE — ASSESSMENT & PLAN NOTE
58yoF without much cardiac history but with a hx of syncopal events (vs seizures) for the past 12-18 months admitted to the epillepsy monitoring unit for evaluation of these episodes. She had an event typical of some her syncopal events ( states she has different quality syncopal events) where she was noted to have sinus rhythm with high grade AV block. She had about 8 seconds of just P waves with no AV conduction. She denies any chest pain at baseline but occasionally has some chest pain after these episodes. There was some slowing of her sinus rhythm (all 1:1 AV conduction) prior to the event caught on telemetry, but she was resting in bed when this occurred. Some events happen after using the restroom, but some are completely random while at rest.     Baseline NSR with LBBB, QRS around 120ms  Prior Holter in 2023 with mostly SR, some ectopy, and a run of AT  TTE in 2023 with preserved EF  Not on AV yves blocking meds  No previous intra-cardiac procedures  Troponin negative  ECHO 8/24 Hyperdynamic systolic ,LVEF 70%, no other abnormalities.     Plan:  - TVP in place. Initial threshold 0.4 mA. Set to 10 mA output, backup rate 40 bpm  - Continuous tele  - EP consult for consideration of EPS, possible PPM  - Thyroid panel, basic inflammatory markers, BNP

## 2024-08-25 NOTE — ASSESSMENT & PLAN NOTE
57 yo F without much cardiac history but with a hx of syncopal events (vs seizures) for the past 12-18 months admitted to the epillepsy monitoring unit for evaluation of these episodes. She had an event typical of some her syncopal events ( states she has different quality syncopal events) where she was noted to have sinus rhythm with high grade AV block.   Tele concerning for phase 4 block.   Baseline NSR with LBBB, QRS around 120ms  Prior Holter in 2023 with mostly SR, some ectopy, and a run of AT  TTE in 2023 with preserved EF  Not on AV yves blocking meds  No previous intra-cardiac procedures  Troponin negative     Plan:    - TVP in place. Threshold 0.5 mA. Set to 10 mA output, backup rate 40 bpm  - Plans for PPM Monday tentatively; NPO MN    - Continue on Tele

## 2024-08-25 NOTE — PROGRESS NOTES
"[facility]  Cardiac Electrophysiology  Progress Note    Admission Date: 8/22/2024  Code Status: Full Code   Attending Physician: Quang Mcknight MD   Expected Discharge Date: 8/28/2024  Principal Problem:Complete AV block    Subjective:     Interval History:   No acute events overnight. Tele reviewed with Hr 70-80, no acute events.   Patient emotional this AM, anxious about her her own illness and "want God to take her."       Review of Systems   Constitutional: Negative for diaphoresis and fever.   Cardiovascular:  Negative for chest pain, dyspnea on exertion, leg swelling, near-syncope, orthopnea, palpitations, paroxysmal nocturnal dyspnea and syncope.   Respiratory:  Negative for cough and shortness of breath.    Gastrointestinal:  Negative for abdominal pain, diarrhea, nausea and vomiting.   Neurological:  Negative for light-headedness.   Psychiatric/Behavioral:  Negative for altered mental status and substance abuse.      Objective:     Vital Signs (Most Recent):  Temp: 98.2 °F (36.8 °C) (08/25/24 0705)  Pulse: 85 (08/25/24 0830)  Resp: (!) 38 (08/25/24 0830)  BP: (!) 170/75 (08/25/24 0800)  SpO2: (!) 94 % (08/25/24 0845) Vital Signs (24h Range):  Temp:  [98.1 °F (36.7 °C)-98.6 °F (37 °C)] 98.2 °F (36.8 °C)  Pulse:  [] 85  Resp:  [16-45] 38  SpO2:  [85 %-96 %] 94 %  BP: (120-170)/() 170/75     Weight: 86.2 kg (190 lb)  Body mass index is 33.66 kg/m².     SpO2: (!) 94 %        Physical Exam  Constitutional:       General: She is not in acute distress.     Appearance: Normal appearance. She is well-developed.   HENT:      Head: Normocephalic and atraumatic.      Mouth/Throat:      Mouth: Mucous membranes are moist.      Pharynx: Oropharynx is clear.   Eyes:      Extraocular Movements: Extraocular movements intact.      Pupils: Pupils are equal, round, and reactive to light.   Cardiovascular:      Rate and Rhythm: Normal rate and regular rhythm.      Pulses: Normal pulses. " "     Heart sounds: Normal heart sounds.   Pulmonary:      Effort: Pulmonary effort is normal. No respiratory distress.      Breath sounds: Normal breath sounds. No wheezing or rales.   Abdominal:      General: Abdomen is flat. There is no distension.      Palpations: Abdomen is soft.      Tenderness: There is no abdominal tenderness. There is no guarding.   Musculoskeletal:         General: No swelling. Normal range of motion.      Cervical back: Normal range of motion and neck supple.      Right lower leg: No edema.      Left lower leg: No edema.   Skin:     General: Skin is warm and dry.   Neurological:      Mental Status: She is alert and oriented to person, place, and time. Mental status is at baseline.   Psychiatric:         Mood and Affect: Mood normal.      Comments: Anxious             Significant Labs: EP:   Recent Labs   Lab 08/23/24 2028 08/24/24 0337 08/24/24 0337 08/25/24  0253    140  --  140   K 4.2 4.2  --  4.1    107  --  107   CO2 26 24  --  23   * 122*  --  123*   BUN 14 12  --  12   CREATININE 1.2 0.9  --  0.9   CALCIUM 9.5 9.7  --  9.4   PROT 6.7 6.8  --  6.6   ALBUMIN 3.5 3.6  --  3.5   BILITOT 0.2 0.3  --  0.6   ALKPHOS 117 115  --  108   AST 22 23  --  30   ALT 39 38  --  45*   ANIONGAP 9 9  --  10   WBC  --  10.63  --  10.99   HGB  --  15.3  --  15.7   HCT  --  45.4   < > 47.4   PLT  --  267  --  245    < > = values in this interval not displayed.   , ABG: No results for input(s): "PH", "PCO2", "HCO3", "POCSATURATED", "BE" in the last 48 hours., Blood Culture: No results for input(s): "LABBLOO" in the last 48 hours., BMP:   Recent Labs   Lab 08/23/24 2028 08/24/24 0337 08/25/24 0253   * 122* 123*    140 140   K 4.2 4.2 4.1    107 107   CO2 26 24 23   BUN 14 12 12   CREATININE 1.2 0.9 0.9   CALCIUM 9.5 9.7 9.4   MG 2.1  --   --    , CMP:   Recent Labs   Lab 08/23/24 2028 08/24/24 0337 08/25/24  0253    140 140   K 4.2 4.2 4.1    107 " "107   CO2 26 24 23   * 122* 123*   BUN 14 12 12   CREATININE 1.2 0.9 0.9   CALCIUM 9.5 9.7 9.4   PROT 6.7 6.8 6.6   ALBUMIN 3.5 3.6 3.5   BILITOT 0.2 0.3 0.6   ALKPHOS 117 115 108   AST 22 23 30   ALT 39 38 45*   ANIONGAP 9 9 10   , CBC:   Recent Labs   Lab 08/24/24  0337 08/25/24  0253   WBC 10.63 10.99   HGB 15.3 15.7   HCT 45.4 47.4    245   , INR: No results for input(s): "INR", "PROTIME" in the last 48 hours., Lipid Panel No results for input(s): "CHOL", "HDL", "LDLCALC", "TRIG", "CHOLHDL" in the last 48 hours.,   Pathology Results  (Last 10 years)      None        , Troponin   Recent Labs   Lab 08/23/24 2028 08/24/24  2336 08/25/24  0253   TROPONINI <0.006 0.065* 0.050*   , and All pertinent lab results from the last 24 hours have been reviewed.     Assessment and Plan:     * High grade AV block  57 yo F without much cardiac history but with a hx of syncopal events (vs seizures) for the past 12-18 months admitted to the epillepsy monitoring unit for evaluation of these episodes. She had an event typical of some her syncopal events ( states she has different quality syncopal events) where she was noted to have sinus rhythm with high grade AV block.   Tele concerning for phase 4 block.   Baseline NSR with LBBB, QRS around 120ms  Prior Holter in 2023 with mostly SR, some ectopy, and a run of AT  TTE in 2023 with preserved EF  Not on AV yves blocking meds  No previous intra-cardiac procedures  Troponin negative     Plan:    - TVP in place. Threshold 0.5 mA. Set to 10 mA output, backup rate 40 bpm  - Plans for PPM Monday tentatively; NPO MN    - Continue on Tele               Snehal Alejo MD  Cardiac Electrophysiology  [facility] - Surgical Intensive Care    "
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
## 2024-08-25 NOTE — NURSING
SICU PLAN OF CARE    Dx: Complete AV block    Permanent pacemaker insertion tomorrow. EEG continuous until removal required for procedure.     Vital Signs (last 12 hours):   Temp:  [98.2 °F (36.8 °C)-98.4 °F (36.9 °C)]   Pulse:  []   Resp:  [16-29]   BP: (107-170)/()   SpO2:  [85 %-97 %]      Neuro: AAOx4, Follows commands , and Moves all extremities spontaneously     Cardiac: normal sinus rhythm  TVP in place. Set to 10 mA output, backup rate 40 bpm. Intrinsic rate of 80s-90s   MAP >65    Respiratory: Room Air 92%-95% spo2    Urine Output: Voids Spontaneously  550 mL/shift    Diet: Regular, NPO at midnight for perm pacemaker insertion tomorrow     Labs/Accuchecks: AM labs     Skin:  WDL.  Patient turned q2h, bony prominences protected, and mattress inflated/working correctly.     Shift Events:  FROY.  See flowsheet for further assessment/details.  Family updated on current condition/plan of care, questions answered, and emotional support provided.  MD updated on current condition, vitals, labs, and gtts.

## 2024-08-25 NOTE — SUBJECTIVE & OBJECTIVE
Interval History: Pleuritic CP overnight, without troponin delta, and w/o dynamic changes in EKG. At the time of my evaluation PT asymptomatic.    ROS  Objective:     Vital Signs (Most Recent):  Temp: 98.2 °F (36.8 °C) (08/25/24 0705)  Pulse: 85 (08/25/24 0830)  Resp: (!) 38 (08/25/24 0830)  BP: (!) 170/75 (08/25/24 0800)  SpO2: (!) 94 % (08/25/24 0845) Vital Signs (24h Range):  Temp:  [98.1 °F (36.7 °C)-98.6 °F (37 °C)] 98.2 °F (36.8 °C)  Pulse:  [] 85  Resp:  [16-45] 38  SpO2:  [85 %-96 %] 94 %  BP: (122-170)/() 170/75     Weight: 86.2 kg (190 lb)  Body mass index is 33.66 kg/m².     SpO2: (!) 94 %         Intake/Output Summary (Last 24 hours) at 8/25/2024 1136  Last data filed at 8/25/2024 0800  Gross per 24 hour   Intake 210 ml   Output 1300 ml   Net -1090 ml       Lines/Drains/Airways       Central Venous Catheter Line  Duration                  Percutaneous Central Line - Cordis 08/24/24 0029 Internal Jugular Right 1 day              Drain  Duration             Female External Urinary Catheter w/ Suction 08/24/24 0200 1 day              Line  Duration                  Pacer Wires 08/24/24 0044 1 day              Peripheral Intravenous Line  Duration                  Peripheral IV - Single Lumen 08/23/24 2302 20 G Posterior;Right Hand 1 day         Peripheral IV - Single Lumen 08/23/24 2317 20 G Anterior;Right Forearm 1 day                       Physical Exam     Constitutional:       General: She is not in acute distress.     Appearance: Normal appearance. She is well-developed.   HENT:      Head: Normocephalic and atraumatic.      Mouth/Throat:      Mouth: Mucous membranes are moist.      Pharynx: Oropharynx is clear.   Eyes:      Extraocular Movements: Extraocular movements intact.      Pupils: Pupils are equal, round, and reactive to light.   Cardiovascular:      Rate and Rhythm: Normal rate and regular rhythm.      Pulses: Normal pulses.      Heart sounds: Normal heart sounds.   Pulmonary:       Effort: Pulmonary effort is normal. No respiratory distress.      Breath sounds: Normal breath sounds. No wheezing or rales.   Abdominal:      General: Abdomen is flat. There is no distension.      Palpations: Abdomen is soft.      Tenderness: There is no abdominal tenderness. There is no guarding.   Musculoskeletal:         General: No swelling. Normal range of motion.      Cervical back: Normal range of motion and neck supple.      Right lower leg: No edema.      Left lower leg: No edema.   Skin:     General: Skin is warm and dry.   Neurological:      Mental Status: She is alert and oriented to person, place, and time. Mental status is at baseline.   Psychiatric:         Mood and Affect: Mood normal.      Comments: Anxious  Significant Labs: All pertinent lab results from the last 24 hours have been reviewed.    Significant Imaging:  REVIEWED

## 2024-08-25 NOTE — PROGRESS NOTES
[facility]  Cardiology  Progress Note    Patient Name: Vesna Richards  MRN: [id number]  Admission Date: 8/22/2024  Hospital Length of Stay: 3 days  Code Status: Full Code   Attending Physician: Quang Mcknight MD   Primary Care Physician: Emily Dumont MD  Expected Discharge Date: 8/28/2024  Principal Problem:Complete AV block    Subjective:     Hospital Course:   Patient was admitted to CCU for paroxysmal high grade AV block. TVP was put on place 10mA output, backuprate 40 bpm. Echo was done 8/24 of poor quality: LVE hyperdynamic Systolic function EF 70%. Will be evaluated for PPM on Monday    Interval History: Pleuritic CP overnight, without troponin delta, and w/o dynamic changes in EKG. At the time of my evaluation PT asymptomatic.    ROS  Objective:     Vital Signs (Most Recent):  Temp: 98.2 °F (36.8 °C) (08/25/24 0705)  Pulse: 85 (08/25/24 0830)  Resp: (!) 38 (08/25/24 0830)  BP: (!) 170/75 (08/25/24 0800)  SpO2: (!) 94 % (08/25/24 0845) Vital Signs (24h Range):  Temp:  [98.1 °F (36.7 °C)-98.6 °F (37 °C)] 98.2 °F (36.8 °C)  Pulse:  [] 85  Resp:  [16-45] 38  SpO2:  [85 %-96 %] 94 %  BP: (122-170)/() 170/75     Weight: 86.2 kg (190 lb)  Body mass index is 33.66 kg/m².     SpO2: (!) 94 %         Intake/Output Summary (Last 24 hours) at 8/25/2024 1136  Last data filed at 8/25/2024 0800  Gross per 24 hour   Intake 210 ml   Output 1300 ml   Net -1090 ml       Lines/Drains/Airways       Central Venous Catheter Line  Duration                  Percutaneous Central Line - Cordis 08/24/24 0029 Internal Jugular Right 1 day              Drain  Duration             Female External Urinary Catheter w/ Suction 08/24/24 0200 1 day              Line  Duration                  Pacer Wires 08/24/24 0044 1 day              Peripheral Intravenous Line  Duration                  Peripheral IV - Single Lumen 08/23/24 2302 20 G Posterior;Right Hand 1 day         Peripheral IV - Single Lumen  08/23/24 2317 20 G Anterior;Right Forearm 1 day                       Physical Exam     Constitutional:       General: She is not in acute distress.     Appearance: Normal appearance. She is well-developed.   HENT:      Head: Normocephalic and atraumatic.      Mouth/Throat:      Mouth: Mucous membranes are moist.      Pharynx: Oropharynx is clear.   Eyes:      Extraocular Movements: Extraocular movements intact.      Pupils: Pupils are equal, round, and reactive to light.   Cardiovascular:      Rate and Rhythm: Normal rate and regular rhythm.      Pulses: Normal pulses.      Heart sounds: Normal heart sounds.   Pulmonary:      Effort: Pulmonary effort is normal. No respiratory distress.      Breath sounds: Normal breath sounds. No wheezing or rales.   Abdominal:      General: Abdomen is flat. There is no distension.      Palpations: Abdomen is soft.      Tenderness: There is no abdominal tenderness. There is no guarding.   Musculoskeletal:         General: No swelling. Normal range of motion.      Cervical back: Normal range of motion and neck supple.      Right lower leg: No edema.      Left lower leg: No edema.   Skin:     General: Skin is warm and dry.   Neurological:      Mental Status: She is alert and oriented to person, place, and time. Mental status is at baseline.   Psychiatric:         Mood and Affect: Mood normal.      Comments: Anxious  Significant Labs: All pertinent lab results from the last 24 hours have been reviewed.    Significant Imaging:  REVIEWED  Assessment and Plan:       * High grade AV block  58yoF without much cardiac history but with a hx of syncopal events (vs seizures) for the past 12-18 months admitted to the epillepsy monitoring unit for evaluation of these episodes. She had an event typical of some her syncopal events ( states she has different quality syncopal events) where she was noted to have sinus rhythm with high grade AV block. She had about 8 seconds of just P waves  with no AV conduction. She denies any chest pain at baseline but occasionally has some chest pain after these episodes. There was some slowing of her sinus rhythm (all 1:1 AV conduction) prior to the event caught on telemetry, but she was resting in bed when this occurred. Some events happen after using the restroom, but some are completely random while at rest.     Baseline NSR with LBBB, QRS around 120ms  Prior Holter in 2023 with mostly SR, some ectopy, and a run of AT  TTE in 2023 with preserved EF  Not on AV yves blocking meds  No previous intra-cardiac procedures  Troponin negative  ECHO 8/24 Hyperdynamic systolic ,LVEF 70%, no other abnormalities.     Plan:  - TVP in place. Initial threshold 0.4 mA. Set to 10 mA output, backup rate 40 bpm  - Continuous tele  - EP consult for consideration of EPS, possible PPM  - Thyroid panel, basic inflammatory markers, BNP    Seizure-like activity  EEG's negative  Admitted on EMU  Epilepsy following  EEG off for TVP, can be re-placed if needed    Tobacco use  Encourage cessation    Hyperlipidemia  Continue home statin      Anxiety  -Currently on hydroxyzine PRN  -Consulted Psychology         VTE Risk Mitigation (From admission, onward)           Ordered     IP VTE HIGH RISK PATIENT  Once         08/22/24 1132     Place sequential compression device  Until discontinued         08/22/24 1133                    Aaron Swift MD  Cardiology  [facility]

## 2024-08-25 NOTE — SUBJECTIVE & OBJECTIVE
Interval History: as above    Current Facility-Administered Medications   Medication Dose Route Frequency Provider Last Rate Last Admin    0.9%  NaCl infusion   Intravenous Continuous Gillies, Connor M, DO 10 mL/hr at 08/25/24 0800 Rate Verify at 08/25/24 0800    acetaminophen tablet 650 mg  650 mg Oral Q4H PRN Delfino Verma MD   650 mg at 08/25/24 0516    atorvastatin tablet 40 mg  40 mg Oral Daily Delfino Verma MD   40 mg at 08/25/24 0840    cholecalciferol (vitamin D3) 125 mcg (5,000 unit) tablet 5,000 Units  5,000 Units Oral Daily Delfino Verma MD   5,000 Units at 08/25/24 0840    docusate sodium capsule 100 mg  100 mg Oral BID PRN Delfino Verma MD        hydrOXYzine pamoate capsule 50 mg  50 mg Oral BID Delfino Verma MD   50 mg at 08/25/24 0840    LORazepam injection 2 mg  2 mg Intravenous PRN Delfino Verma MD        nicotine 21 mg/24 hr 1 patch  1 patch Transdermal Daily PRN Nicole Guerrero PA-C        ondansetron disintegrating tablet 8 mg  8 mg Oral Q8H PRN Delfino Verma MD   8 mg at 08/24/24 1937    sodium chloride 0.9% flush 10 mL  10 mL Intravenous PRN Delfino Verma MD         Continuous Infusions:   0.9% NaCl   Intravenous Continuous 10 mL/hr at 08/25/24 0800 Rate Verify at 08/25/24 0800       Review of Systems   Constitutional:  Negative for fever.   Respiratory:  Positive for chest tightness.    Neurological:  Negative for seizures.     Objective:     Vital Signs (Most Recent):  Temp: 98.2 °F (36.8 °C) (08/25/24 0705)  Pulse: 85 (08/25/24 0830)  Resp: (!) 38 (08/25/24 0830)  BP: (!) 170/75 (08/25/24 0800)  SpO2: (!) 94 % (08/25/24 0845) Vital Signs (24h Range):  Temp:  [98.1 °F (36.7 °C)-98.6 °F (37 °C)] 98.2 °F (36.8 °C)  Pulse:  [] 85  Resp:  [16-45] 38  SpO2:  [85 %-96 %] 94 %  BP: (120-170)/() 170/75     Weight: 86.2 kg (190 lb)  Body mass index is 33.66 kg/m².     Physical Exam        Awake, alert, tearful, speech coherent and no aphasia    Significant Labs: BMP:   Recent  Labs   Lab 08/23/24 2028 08/24/24 0337 08/25/24  0253   * 122* 123*    140 140   K 4.2 4.2 4.1    107 107   CO2 26 24 23   BUN 14 12 12   CREATININE 1.2 0.9 0.9   CALCIUM 9.5 9.7 9.4   MG 2.1  --   --      CBC:   Recent Labs   Lab 08/24/24 0337 08/25/24  0253   WBC 10.63 10.99   HGB 15.3 15.7   HCT 45.4 47.4    245     CMP:   Recent Labs   Lab 08/23/24 2028 08/24/24 0337 08/25/24  0253   * 122* 123*    140 140   K 4.2 4.2 4.1    107 107   CO2 26 24 23   BUN 14 12 12   CREATININE 1.2 0.9 0.9   CALCIUM 9.5 9.7 9.4   MG 2.1  --   --    PROT 6.7 6.8 6.6   ALBUMIN 3.5 3.6 3.5   BILITOT 0.2 0.3 0.6   ALKPHOS 117 115 108   AST 22 23 30   ALT 39 38 45*   ANIONGAP 9 9 10     All pertinent lab results from the past 24 hours have been reviewed.    Significant Studies: I have reviewed and interpreted all pertinent imaging results/findings within the past 24 hours. MRI with right temporal encephalomalacia.

## 2024-08-25 NOTE — NURSING
"Pt is having a sense of impending doom. Pt states that she has felt like "God has been calling her home all day". She also stated that she feel like " a piece of her is floating away" each time she coughs. Dr. Ritchie notified. Fresh transcutaneous pacing pads applied.   "
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
## 2024-08-26 ENCOUNTER — ANESTHESIA EVENT (OUTPATIENT)
Dept: MEDSURG UNIT | Facility: HOSPITAL | Age: 59
DRG: 244 | End: 2024-08-26
Payer: COMMERCIAL

## 2024-08-26 ENCOUNTER — TELEPHONE (OUTPATIENT)
Dept: NEUROLOGY | Facility: CLINIC | Age: 59
End: 2024-08-26
Payer: COMMERCIAL

## 2024-08-26 ENCOUNTER — ANESTHESIA (OUTPATIENT)
Dept: MEDSURG UNIT | Facility: HOSPITAL | Age: 59
DRG: 244 | End: 2024-08-26
Payer: COMMERCIAL

## 2024-08-26 ENCOUNTER — DOCUMENTATION ONLY (OUTPATIENT)
Dept: NEUROLOGY | Facility: CLINIC | Age: 59
End: 2024-08-26

## 2024-08-26 PROBLEM — R56.9 SEIZURE-LIKE ACTIVITY: Status: ACTIVE | Noted: 2024-08-26

## 2024-08-26 LAB
ALBUMIN SERPL BCP-MCNC: 3.3 G/DL (ref 3.5–5.2)
ALP SERPL-CCNC: 98 U/L (ref 55–135)
ALT SERPL W/O P-5'-P-CCNC: 47 U/L (ref 10–44)
ANION GAP SERPL CALC-SCNC: 9 MMOL/L (ref 8–16)
AST SERPL-CCNC: 29 U/L (ref 10–40)
BASOPHILS # BLD AUTO: 0.05 K/UL (ref 0–0.2)
BASOPHILS NFR BLD: 0.5 % (ref 0–1.9)
BILIRUB SERPL-MCNC: 0.5 MG/DL (ref 0.1–1)
BUN SERPL-MCNC: 13 MG/DL (ref 6–20)
CALCIUM SERPL-MCNC: 9.1 MG/DL (ref 8.7–10.5)
CHLORIDE SERPL-SCNC: 105 MMOL/L (ref 95–110)
CO2 SERPL-SCNC: 25 MMOL/L (ref 23–29)
CREAT SERPL-MCNC: 0.9 MG/DL (ref 0.5–1.4)
DIFFERENTIAL METHOD BLD: NORMAL
EOSINOPHIL # BLD AUTO: 0.1 K/UL (ref 0–0.5)
EOSINOPHIL NFR BLD: 1.2 % (ref 0–8)
ERYTHROCYTE [DISTWIDTH] IN BLOOD BY AUTOMATED COUNT: 12.6 % (ref 11.5–14.5)
EST. GFR  (NO RACE VARIABLE): >60 ML/MIN/1.73 M^2
GLUCOSE SERPL-MCNC: 113 MG/DL (ref 70–110)
HCT VFR BLD AUTO: 44.2 % (ref 37–48.5)
HGB BLD-MCNC: 14.6 G/DL (ref 12–16)
IMM GRANULOCYTES # BLD AUTO: 0.03 K/UL (ref 0–0.04)
IMM GRANULOCYTES NFR BLD AUTO: 0.3 % (ref 0–0.5)
LAMOTRIGINE SERPL-MCNC: 4.3 UG/ML (ref 2–15)
LEVETIRACETAM SERPL-MCNC: 27.5 UG/ML (ref 3–60)
LYMPHOCYTES # BLD AUTO: 3.7 K/UL (ref 1–4.8)
LYMPHOCYTES NFR BLD: 38.6 % (ref 18–48)
MCH RBC QN AUTO: 29.7 PG (ref 27–31)
MCHC RBC AUTO-ENTMCNC: 33 G/DL (ref 32–36)
MCV RBC AUTO: 90 FL (ref 82–98)
MONOCYTES # BLD AUTO: 0.8 K/UL (ref 0.3–1)
MONOCYTES NFR BLD: 8.2 % (ref 4–15)
NEUTROPHILS # BLD AUTO: 4.8 K/UL (ref 1.8–7.7)
NEUTROPHILS NFR BLD: 51.2 % (ref 38–73)
NRBC BLD-RTO: 0 /100 WBC
PLATELET # BLD AUTO: 197 K/UL (ref 150–450)
PMV BLD AUTO: 10 FL (ref 9.2–12.9)
POTASSIUM SERPL-SCNC: 4.1 MMOL/L (ref 3.5–5.1)
PROT SERPL-MCNC: 6.2 G/DL (ref 6–8.4)
RBC # BLD AUTO: 4.92 M/UL (ref 4–5.4)
SODIUM SERPL-SCNC: 139 MMOL/L (ref 136–145)
WBC # BLD AUTO: 9.46 K/UL (ref 3.9–12.7)

## 2024-08-26 PROCEDURE — 33208 INSRT HEART PM ATRIAL & VENT: CPT | Mod: KX | Performed by: INTERNAL MEDICINE

## 2024-08-26 PROCEDURE — 51798 US URINE CAPACITY MEASURE: CPT

## 2024-08-26 PROCEDURE — 25000242 PHARM REV CODE 250 ALT 637 W/ HCPCS: Performed by: NURSE ANESTHETIST, CERTIFIED REGISTERED

## 2024-08-26 PROCEDURE — 93010 ELECTROCARDIOGRAM REPORT: CPT | Mod: ,,, | Performed by: INTERNAL MEDICINE

## 2024-08-26 PROCEDURE — D9220A PRA ANESTHESIA: Mod: CRNA,,, | Performed by: NURSE ANESTHETIST, CERTIFIED REGISTERED

## 2024-08-26 PROCEDURE — 33208 INSRT HEART PM ATRIAL & VENT: CPT | Mod: KX,,, | Performed by: INTERNAL MEDICINE

## 2024-08-26 PROCEDURE — 37000008 HC ANESTHESIA 1ST 15 MINUTES: Performed by: INTERNAL MEDICINE

## 2024-08-26 PROCEDURE — 80053 COMPREHEN METABOLIC PANEL: CPT | Performed by: STUDENT IN AN ORGANIZED HEALTH CARE EDUCATION/TRAINING PROGRAM

## 2024-08-26 PROCEDURE — 51701 INSERT BLADDER CATHETER: CPT

## 2024-08-26 PROCEDURE — 85025 COMPLETE CBC W/AUTO DIFF WBC: CPT | Performed by: STUDENT IN AN ORGANIZED HEALTH CARE EDUCATION/TRAINING PROGRAM

## 2024-08-26 PROCEDURE — 0JH606Z INSERTION OF PACEMAKER, DUAL CHAMBER INTO CHEST SUBCUTANEOUS TISSUE AND FASCIA, OPEN APPROACH: ICD-10-PCS | Performed by: INTERNAL MEDICINE

## 2024-08-26 PROCEDURE — 37000009 HC ANESTHESIA EA ADD 15 MINS: Performed by: INTERNAL MEDICINE

## 2024-08-26 PROCEDURE — 02H63JZ INSERTION OF PACEMAKER LEAD INTO RIGHT ATRIUM, PERCUTANEOUS APPROACH: ICD-10-PCS | Performed by: INTERNAL MEDICINE

## 2024-08-26 PROCEDURE — 20000000 HC ICU ROOM

## 2024-08-26 PROCEDURE — 25000003 PHARM REV CODE 250: Performed by: NURSE ANESTHETIST, CERTIFIED REGISTERED

## 2024-08-26 PROCEDURE — 25000003 PHARM REV CODE 250: Performed by: INTERNAL MEDICINE

## 2024-08-26 PROCEDURE — 93005 ELECTROCARDIOGRAM TRACING: CPT

## 2024-08-26 PROCEDURE — 94761 N-INVAS EAR/PLS OXIMETRY MLT: CPT

## 2024-08-26 PROCEDURE — 63600175 PHARM REV CODE 636 W HCPCS: Performed by: NURSE ANESTHETIST, CERTIFIED REGISTERED

## 2024-08-26 PROCEDURE — C1898 LEAD, PMKR, OTHER THAN TRANS: HCPCS | Performed by: INTERNAL MEDICINE

## 2024-08-26 PROCEDURE — C1769 GUIDE WIRE: HCPCS | Performed by: INTERNAL MEDICINE

## 2024-08-26 PROCEDURE — C1785 PMKR, DUAL, RATE-RESP: HCPCS | Performed by: INTERNAL MEDICINE

## 2024-08-26 PROCEDURE — 27201423 OPTIME MED/SURG SUP & DEVICES STERILE SUPPLY: Performed by: INTERNAL MEDICINE

## 2024-08-26 PROCEDURE — C1894 INTRO/SHEATH, NON-LASER: HCPCS | Performed by: INTERNAL MEDICINE

## 2024-08-26 PROCEDURE — C1887 CATHETER, GUIDING: HCPCS | Performed by: INTERNAL MEDICINE

## 2024-08-26 PROCEDURE — 25000003 PHARM REV CODE 250

## 2024-08-26 PROCEDURE — D9220A PRA ANESTHESIA: Mod: ANES,,, | Performed by: ANESTHESIOLOGY

## 2024-08-26 PROCEDURE — 25000003 PHARM REV CODE 250: Performed by: STUDENT IN AN ORGANIZED HEALTH CARE EDUCATION/TRAINING PROGRAM

## 2024-08-26 PROCEDURE — 63600175 PHARM REV CODE 636 W HCPCS: Mod: JZ,JG | Performed by: INTERNAL MEDICINE

## 2024-08-26 PROCEDURE — 02HK3JZ INSERTION OF PACEMAKER LEAD INTO RIGHT VENTRICLE, PERCUTANEOUS APPROACH: ICD-10-PCS | Performed by: INTERNAL MEDICINE

## 2024-08-26 DEVICE — LEAD 3830 US MKT/ 69CM MRI LBBAP
Type: IMPLANTABLE DEVICE | Site: HEART | Status: FUNCTIONAL
Brand: SELECTSECURE™ MRI SURESCAN™

## 2024-08-26 DEVICE — IPG W3DR01 AZURE S DR MRI USA
Type: IMPLANTABLE DEVICE | Site: HEART | Status: FUNCTIONAL
Brand: AZURE™ S DR MRI SURESCAN™

## 2024-08-26 DEVICE — LEAD 5076-52 MRI US RCMCRD
Type: IMPLANTABLE DEVICE | Site: HEART | Status: FUNCTIONAL
Brand: CAPSUREFIX NOVUS MRI™ SURESCAN®

## 2024-08-26 RX ORDER — DOXYCYCLINE HYCLATE 100 MG
100 TABLET ORAL EVERY 12 HOURS
Status: DISCONTINUED | OUTPATIENT
Start: 2024-08-26 | End: 2024-08-27 | Stop reason: HOSPADM

## 2024-08-26 RX ORDER — ALBUTEROL SULFATE 90 UG/1
INHALANT RESPIRATORY (INHALATION)
Status: DISCONTINUED | OUTPATIENT
Start: 2024-08-26 | End: 2024-08-26

## 2024-08-26 RX ORDER — VANCOMYCIN HYDROCHLORIDE 1 G/20ML
INJECTION, POWDER, LYOPHILIZED, FOR SOLUTION INTRAVENOUS
Status: DISCONTINUED | OUTPATIENT
Start: 2024-08-26 | End: 2024-08-27

## 2024-08-26 RX ORDER — MIDAZOLAM HYDROCHLORIDE 1 MG/ML
INJECTION INTRAMUSCULAR; INTRAVENOUS
Status: DISCONTINUED | OUTPATIENT
Start: 2024-08-26 | End: 2024-08-26

## 2024-08-26 RX ORDER — DEXMEDETOMIDINE HYDROCHLORIDE 100 UG/ML
INJECTION, SOLUTION INTRAVENOUS
Status: DISCONTINUED | OUTPATIENT
Start: 2024-08-26 | End: 2024-08-26

## 2024-08-26 RX ORDER — ONDANSETRON HYDROCHLORIDE 2 MG/ML
INJECTION, SOLUTION INTRAVENOUS
Status: DISCONTINUED | OUTPATIENT
Start: 2024-08-26 | End: 2024-08-26

## 2024-08-26 RX ORDER — ACETAMINOPHEN 325 MG/1
650 TABLET ORAL EVERY 4 HOURS PRN
Status: DISCONTINUED | OUTPATIENT
Start: 2024-08-26 | End: 2024-08-27 | Stop reason: HOSPADM

## 2024-08-26 RX ORDER — LIDOCAINE HYDROCHLORIDE 20 MG/ML
INJECTION, SOLUTION INFILTRATION; PERINEURAL
Status: DISCONTINUED | OUTPATIENT
Start: 2024-08-26 | End: 2024-08-27

## 2024-08-26 RX ORDER — BUPIVACAINE HYDROCHLORIDE 2.5 MG/ML
INJECTION, SOLUTION EPIDURAL; INFILTRATION; INTRACAUDAL
Status: DISCONTINUED | OUTPATIENT
Start: 2024-08-26 | End: 2024-08-27

## 2024-08-26 RX ORDER — PROPOFOL 10 MG/ML
VIAL (ML) INTRAVENOUS CONTINUOUS PRN
Status: DISCONTINUED | OUTPATIENT
Start: 2024-08-26 | End: 2024-08-26

## 2024-08-26 RX ORDER — PHENYLEPHRINE HYDROCHLORIDE 10 MG/ML
INJECTION INTRAVENOUS
Status: DISCONTINUED | OUTPATIENT
Start: 2024-08-26 | End: 2024-08-26

## 2024-08-26 RX ORDER — SODIUM CHLORIDE 0.9 G/100ML
IRRIGANT IRRIGATION
Status: DISCONTINUED | OUTPATIENT
Start: 2024-08-26 | End: 2024-08-27

## 2024-08-26 RX ADMIN — VANCOMYCIN HYDROCHLORIDE 1275 MG: 1 INJECTION, POWDER, LYOPHILIZED, FOR SOLUTION INTRAVENOUS at 01:08

## 2024-08-26 RX ADMIN — CHOLECALCIFEROL TAB 125 MCG (5000 UNIT) 5000 UNITS: 125 TAB at 08:08

## 2024-08-26 RX ADMIN — HYDROXYZINE PAMOATE 50 MG: 25 CAPSULE ORAL at 08:08

## 2024-08-26 RX ADMIN — PHENYLEPHRINE HYDROCHLORIDE 0.4 MCG/KG/MIN: 10 INJECTION INTRAVENOUS at 01:08

## 2024-08-26 RX ADMIN — SODIUM CHLORIDE, SODIUM GLUCONATE, SODIUM ACETATE, POTASSIUM CHLORIDE, MAGNESIUM CHLORIDE, SODIUM PHOSPHATE, DIBASIC, AND POTASSIUM PHOSPHATE: .53; .5; .37; .037; .03; .012; .00082 INJECTION, SOLUTION INTRAVENOUS at 01:08

## 2024-08-26 RX ADMIN — ACETAMINOPHEN 650 MG: 325 TABLET ORAL at 11:08

## 2024-08-26 RX ADMIN — PROPOFOL 200 MCG/KG/MIN: 10 INJECTION, EMULSION INTRAVENOUS at 01:08

## 2024-08-26 RX ADMIN — ALBUTEROL SULFATE 2 PUFF: 108 INHALANT RESPIRATORY (INHALATION) at 01:08

## 2024-08-26 RX ADMIN — ONDANSETRON 4 MG: 2 INJECTION INTRAMUSCULAR; INTRAVENOUS at 01:08

## 2024-08-26 RX ADMIN — PROPOFOL 150 MCG/KG/MIN: 10 INJECTION, EMULSION INTRAVENOUS at 02:08

## 2024-08-26 RX ADMIN — ATORVASTATIN CALCIUM 40 MG: 40 TABLET, FILM COATED ORAL at 08:08

## 2024-08-26 RX ADMIN — DEXMEDETOMIDINE 8 MCG: 200 INJECTION, SOLUTION INTRAVENOUS at 01:08

## 2024-08-26 RX ADMIN — GLYCOPYRROLATE 0.2 MG: 0.2 INJECTION, SOLUTION INTRAMUSCULAR; INTRAVENOUS at 01:08

## 2024-08-26 RX ADMIN — DOXYCYCLINE HYCLATE 100 MG: 100 TABLET, COATED ORAL at 09:08

## 2024-08-26 RX ADMIN — MIDAZOLAM HYDROCHLORIDE 2 MG: 2 INJECTION, SOLUTION INTRAMUSCULAR; INTRAVENOUS at 01:08

## 2024-08-26 RX ADMIN — PHENYLEPHRINE HYDROCHLORIDE 100 MCG: 10 INJECTION INTRAVENOUS at 02:08

## 2024-08-26 RX ADMIN — ACETAMINOPHEN 650 MG: 325 TABLET ORAL at 05:08

## 2024-08-26 RX ADMIN — PROPOFOL 30 MG: 10 INJECTION, EMULSION INTRAVENOUS at 01:08

## 2024-08-26 RX ADMIN — SODIUM CHLORIDE: 9 INJECTION, SOLUTION INTRAVENOUS at 12:08

## 2024-08-26 NOTE — TRANSFER OF CARE
"Anesthesia Transfer of Care Note    Patient: Vesna Richards    Procedure(s) Performed: Procedure(s) (LRB):  INSERTION, CARDIAC PACEMAKER, DUAL CHAMBER (N/A)    Patient location: ICU    Anesthesia Type: MAC and general    Transport from OR: Transported from OR on 6-10 L/min O2 by face mask with adequate spontaneous ventilation. Continuous ECG monitoring in transport. Continuous SpO2 monitoring in transport. Continuos invasive BP monitoring in transport    Post pain: adequate analgesia    Post assessment: no apparent anesthetic complications and tolerated procedure well    Post vital signs: stable    Level of consciousness: awake and alert    Nausea/Vomiting: no nausea/vomiting    Complications: none    Transfer of care protocol was followed      Last vitals: Visit Vitals  BP (!) 130/59 (BP Location: Right arm, Patient Position: Lying)   Pulse 87   Temp 36.7 °C (98.1 °F) (Oral)   Resp 18   Ht 5' 3" (1.6 m)   Wt 85.3 kg (188 lb 0.8 oz)   SpO2 (!) 92%   BMI 33.31 kg/m²     " No

## 2024-08-26 NOTE — ANESTHESIA POSTPROCEDURE EVALUATION
Anesthesia Post Evaluation    Patient: Vesna Richards    Procedure(s) Performed: Procedure(s) (LRB):  INSERTION, CARDIAC PACEMAKER, DUAL CHAMBER (N/A)    Final Anesthesia Type: general      Patient location during evaluation: PACU  Patient participation: Yes- Able to Participate  Level of consciousness: awake and alert  Post-procedure vital signs: reviewed and stable  Pain management: adequate  Airway patency: patent    PONV status at discharge: No PONV  Anesthetic complications: no      Cardiovascular status: blood pressure returned to baseline  Respiratory status: spontaneous ventilation and room air  Hydration status: euvolemic  Follow-up not needed.              Vitals Value Taken Time   /60 08/26/24 1732   Temp 36.9 °C (98.5 °F) 08/26/24 1530   Pulse 98 08/26/24 1743   Resp 39 08/26/24 1743   SpO2 98 % 08/26/24 1743   Vitals shown include unfiled device data.      No case tracking events are documented in the log.      Pain/Hussain Score: Pain Rating Prior to Med Admin: 3 (8/26/2024  5:24 PM)  Pain Rating Post Med Admin: 0 (8/25/2024  6:12 AM)

## 2024-08-26 NOTE — PROGRESS NOTES
[facility]  Cardiac Electrophysiology  Progress Note    Admission Date: 8/22/2024  Code Status: Full Code   Attending Physician: Quang Mcknight MD   Expected Discharge Date: 8/28/2024  Principal Problem:Complete AV block    Subjective:     Interval History:   Tele sinus with rate 60-80s, rare pacing  - Anxious overnight in the s/o procedure today    Objective:     Vital Signs (Most Recent):  Temp: 98.2 °F (36.8 °C) (08/25/24 2301)  Pulse: 74 (08/26/24 0600)  Resp: 17 (08/26/24 0600)  BP: 134/63 (08/26/24 0600)  SpO2: 95 % (08/26/24 0600) Vital Signs (24h Range):  Temp:  [98.2 °F (36.8 °C)-98.4 °F (36.9 °C)] 98.2 °F (36.8 °C)  Pulse:  [] 74  Resp:  [15-39] 17  SpO2:  [90 %-97 %] 95 %  BP: (107-168)/() 134/63     Weight: 85.3 kg (188 lb 0.8 oz)  Body mass index is 33.31 kg/m².     SpO2: 95 %        Physical Exam  Constitutional:       Appearance: Normal appearance.   HENT:      Mouth/Throat:      Mouth: Mucous membranes are moist.   Eyes:      Extraocular Movements: Extraocular movements intact.      Conjunctiva/sclera: Conjunctivae normal.   Cardiovascular:      Rate and Rhythm: Normal rate and regular rhythm.      Pulses: Normal pulses.   Pulmonary:      Effort: Pulmonary effort is normal.   Abdominal:      General: Abdomen is flat.      Palpations: Abdomen is soft.   Skin:     General: Skin is warm and dry.   Neurological:      General: No focal deficit present.      Mental Status: She is alert and oriented to person, place, and time.            Significant Labs: EP:   Recent Labs   Lab 08/25/24  0253 08/26/24  0217    139   K 4.1 4.1    105   CO2 23 25   * 113*   BUN 12 13   CREATININE 0.9 0.9   CALCIUM 9.4 9.1   PROT 6.6 6.2   ALBUMIN 3.5 3.3*   BILITOT 0.6 0.5   ALKPHOS 108 98   AST 30 29   ALT 45* 47*   ANIONGAP 10 9   WBC 10.99 9.46   HGB 15.7 14.6   HCT 47.4 44.2    197       Significant Imaging: Echocardiogram: Transthoracic echo (TTE)  complete (Cupid Only):   Results for orders placed or performed during the hospital encounter of 08/22/24   Echo   Result Value Ref Range    BSA 1.96 m2    LVOT stroke volume 47.31 cm3    LVIDd 3.0 (A) 3.5 - 6.0 cm    LV Systolic Volume 6.98 mL    LV Systolic Volume Index 3.7 mL/m2    LVIDs 2.0 (A) 2.1 - 4.0 cm    LV Diastolic Volume 21.68 mL    LV Diastolic Volume Index 11.47 mL/m2    IVS 0.86 0.6 - 1.1 cm    LVOT diameter 1.88 cm    LVOT area 2.8 cm2    FS 33 28 - 44 %    Left Ventricle Relative Wall Thickness 0.65 cm    Posterior Wall 0.97 0.6 - 1.1 cm    LV mass 71.88 g    LV Mass Index 38 g/m2    MV Peak E Mak 0.45 m/s    TDI LATERAL 0.10 m/s    TDI SEPTAL 0.08 m/s    E/E' ratio 5.00 m/s    MV Peak A Mak 0.83 m/s    E/A ratio 0.54     IVRT 105.61 msec    E wave deceleration time 110.22 msec    LV SEPTAL E/E' RATIO 5.63 m/s    LA Volume Index 14.1 mL/m2    LV LATERAL E/E' RATIO 4.50 m/s    LA volume 26.60 cm3    LVOT peak mak 1.15 m/s    TAPSE 1.85 cm    LA size 2.23 cm    Left Atrium Minor Axis 4.89 cm    Left Atrium Major Axis 4.89 cm    LA volume (mod) 30.55 cm3    LA WIDTH 2.87 cm    LA Volume Index (Mod) 16.2 mL/m2    RA Major Axis 4.39 cm    RA Width 2.83 cm    AV mean gradient 5 mmHg    AV peak gradient 10 mmHg    Ao peak mak 1.57 m/s    Ao VTI 25.90 cm    LVOT peak VTI 17.05 cm    AV valve area 1.83 cm²    AV Velocity Ratio 0.73     AV index (prosthetic) 0.66     DAPHNE by Velocity Ratio 2.03 cm²    MV stenosis pressure 1/2 time 31.96 ms    MV valve area p 1/2 method 6.88 cm2    Sinus 2.84 cm    STJ 2.28 cm    Ascending aorta 2.06 cm    Mean e' 0.09 m/s    ZLVIDS -3.86     ZLVIDD -5.67     Est. RA pres 3 mmHg    Narrative      Small hyperdynamc LV cavity. Consider fluid resuscitation if clinically   indicated.    Overall the study quality was technically difficult. The study was   difficult due to patient's clinical status, body habitus and poor   endocardial visualization. linited valve visualization.     Left Ventricle: The left ventricle is normal in size. Normal wall   thickness. There is concentric remodeling. There is hyperdynamic systolic   function with a visually estimated ejection fraction of greater than 70%.   There is normal diastolic function. if lead present in RV not well   visualized.    Left Ventricle: There is normal diastolic function. Normal left   ventricular filling pressure.    Right Ventricle: Right ventricle was not well visualized due to poor   acoustic window. Normal right ventricular cavity size. Wall thickness is   normal. Right ventricle wall motion  is normal. Systolic function is   normal.    Left Atrium: Normal left atrial size.    Right Atrium: Normal right atrial size.    Aorta: Aortic root is normal in size measuring 2.84 cm. Ascending aorta   is normal measuring 2.06 cm.    IVC/SVC: Normal venous pressure at 3 mmHg.    Prominent pericardial fat pad.       Assessment and Plan:     * High grade AV block  57 yo F with a hx of syncopal events (vs seizures) for the past 12-18 months admitted to the epilepsy monitoring unit for evaluation of these episodes. She had an event typical of some her syncopal events ( states she has different quality syncopal events) where she was noted to have sinus rhythm with high grade AV block. Tele concerning for phase 4 block. Baseline NSR with LBBB, QRS around 120ms. Prior Holter in 2023 with mostly SR, some ectopy, and a run of AT. TTE in 2023 with preserved EF.    Recommendations:  - TVP in place, threshold 1.0 mA, backup rate 40 bpm  - Plan for PPM today  - NPO  - Heparin products held  - Tele        Eva Coelho MD  Cardiac Electrophysiology  [facility] - Surgical Intensive Care

## 2024-08-26 NOTE — TELEPHONE ENCOUNTER
----- Message from Sharri Kelly MD sent at 8/26/2024  1:15 PM CDT -----      AEEG captured 6  events that were non-epileptic.    EMU captured 10 events that were non-epileptic.    She needs CBT and no  AEDs.  ----- Message -----  From: Jay Horton MD  Sent: 8/26/2024  12:23 PM CDT  To: Sharri Kelly MD

## 2024-08-26 NOTE — PROCEDURES
VIDEO ELECTROENCEPHALOGRAM  REPORT     DATE OF SERVICE:8/25-26/24  EEG NUMBER: [id number]  REQUESTED BY:  Dr Kelly  LOCATION OF SERVICE:  [facility]     METHODOLOGY              Electroencephalographic (EEG) recording is with electrodes placed according to the International 10-20 placement system.  Thirty two (32) channels of digital signal (sampling rate of 512/sec) including T1 and T2 was simultaneously recorded from the scalp and may include  EKG, EMG, and/or eye monitors.  Recording band pass was 0.1 to 512 hz.  Digital video recording of the patient is simultaneously recorded with the EEG.  The patient is instructed report clinical symptoms which may occur during the recording session.  EEG and video recording is stored and archived in digital format.  Activation procedures which include photic stimulation, hyperventilation and instructing patients to perform simple task are done in selected patients.              The EEG is displayed on a monitor screen and can be reviewed using different montages.  Computer assisted analysis is employed to detect spike and electrographic seizure activity.   The entire record is submitted for computer analysis.  The entire recording is visually reviewed and the times identified by computer analysis as being spikes or seizures are reviewed again.  Compresses spectral analysis (CSA) is also performed on the activity recorded from each individual channel.  This is displayed as a power display of frequencies from 0 to 30 Hz over time.   The CSA is reviewed looking for asymmetries in power between homologous areas of the scalp and then compared with the original EEG recording.                D[employer] software was also utilized in the review of this study.  This software suite analyzes the EEG recording in multiple domains.  Coherence and rhythmicity is computed to identify EEG sections which may contain organized seizures.  Each channel undergoes analysis to detect presence of spike and  sharp waves which have special and morphological characteristic of epileptic activity.  The routine EEG recording is converted from spacial into frequency domain.  This is then displayed comparing homologous areas to identify areas of significant asymmetry.  Algorithm to identify non-cortically generated artifact is used to separate eye movement, EMG and other artifact from the EEG.       ELECTROENCEPHALOGRAM:     RECORDING TIMES:  Start on 8/25/24 at 07:00  Stop on 8/26/24 at 07:00 - 24 hours  Start on 8/6/24 at 07:00  Stop on 8/6/24 at 09:29 - 2 hours 29 minutes     EEG FINDINGS  Background:   The background is well organized, symmetric and continuous.  There is a normal anterior to posterior gradient consisting of 5-10 mcV amplitude beta activity in the frontal region and well defined alpha activity in the posterior region.   At maximum alertness, there is a well developed 9-10 Hz posterior dominant rhythm that is symmetric and reactive to eye opening and closure noted.     Activating procedures:   Hyperventilation and photic stimulation are not performed      Sleep:   Transition into stage 1 sleep is characterized by attenuation of the posterior dominant rhythm, bilateral theta activity, and vertex waves.  There is also transition into stage II sleep with symmetric vertex waves, sleep spindles, and k complexes noted.      Epileptiform Abnormalities  None     Seizures/Events:   Patient activates event button 21:27:49, 21:47:53,, 21:28:04, 21:28:40, 21:33:52, 21:34:43, 21:46:46, 21:49:19, 21:52:18, 21:59:29.  None of these event button activations are associated with epileptiform activity. Patient later reports that event button activations are due to feelings of anxiety.     EKG:   Overall sinus rhythm, QRS complex appears widened     Impression:   This is a normal awake and sleep EEG.  No epileptiform discharges or seizures were seen.  Clinical events of anxiety are not epileptic in nature.     EMU  summary  8/22-8/23: normal study no events  8/23: event associated with asystole. No seizure activity captured.   8/24-8/25: normal EEG. Clinical events without EEG correlate.   8/25-8/26: normal EEG.  Clinical feelings of anxiety without EEG correlate

## 2024-08-26 NOTE — ASSESSMENT & PLAN NOTE
58yoF without much cardiac history but with a hx of syncopal events (vs seizures) for the past 12-18 months admitted to the epillepsy monitoring unit for evaluation of these episodes. She had an event typical of some her syncopal events ( states she has different quality syncopal events) where she was noted to have sinus rhythm with high grade AV block. She had about 8 seconds of just P waves with no AV conduction. She denies any chest pain at baseline but occasionally has some chest pain after these episodes. There was some slowing of her sinus rhythm (all 1:1 AV conduction) prior to the event caught on telemetry, but she was resting in bed when this occurred. Some events happen after using the restroom, but some are completely random while at rest.     Baseline NSR with LBBB, QRS around 120ms  Prior Holter in 2023 with mostly SR, some ectopy, and a run of AT  TTE in 2023 with preserved EF  Not on AV yves blocking meds  No previous intra-cardiac procedures  Troponin negative  ECHO 8/24 Hyperdynamic systolic ,LVEF 70%, no other abnormalities.     Plan:  - TVP in place. Initial threshold 0.4 mA. Set to 10 mA output, backup rate 40 bpm  - Continuous tele  -  PPM today.  - Thyroid panel, basic inflammatory markers, BNP

## 2024-08-26 NOTE — PLAN OF CARE
[facility]  Discharge Reassessment    Primary Care Provider: Emily Dumont MD    Expected Discharge Date: 8/28/2024    Reassessment (most recent)       Discharge Reassessment - 08/26/24 1108          Discharge Reassessment    Assessment Type Discharge Planning Reassessment     Communicated ANN with patient/caregiver Date not available/Unable to determine     Discharge Plan A Rehab     Discharge Plan B Home;Home with family;Home Health     DME Needed Upon Discharge  --   TBD    Transition of Care Barriers None     Why the patient remains in the hospital Requires continued medical care                     Discharge Plan A and Plan B have been determined by review of patient's clinical status, future medical and therapeutic needs, and coverage/benefits for post-acute care in coordination with multidisciplinary team members.    Marylu Redmond LCSW  Case Management [facility]

## 2024-08-26 NOTE — SUBJECTIVE & OBJECTIVE
"Interval History: NAEON. Reports some "tic" under left breast, intermittent , onset after TTE.    ROS  Objective:     Vital Signs (Most Recent):  Temp: 98.2 °F (36.8 °C) (08/25/24 2301)  Pulse: 74 (08/26/24 0600)  Resp: 17 (08/26/24 0600)  BP: 134/63 (08/26/24 0600)  SpO2: 95 % (08/26/24 0600) Vital Signs (24h Range):  Temp:  [98.2 °F (36.8 °C)-98.4 °F (36.9 °C)] 98.2 °F (36.8 °C)  Pulse:  [] 74  Resp:  [15-39] 17  SpO2:  [90 %-97 %] 95 %  BP: (107-146)/(55-68) 134/63     Weight: 85.3 kg (188 lb 0.8 oz)  Body mass index is 33.31 kg/m².     SpO2: 95 %         Intake/Output Summary (Last 24 hours) at 8/26/2024 0901  Last data filed at 8/26/2024 0600  Gross per 24 hour   Intake 209.86 ml   Output 750 ml   Net -540.14 ml       Lines/Drains/Airways       Central Venous Catheter Line  Duration                  Percutaneous Central Line - Cordis 08/24/24 0029 Internal Jugular Right 2 days              Drain  Duration             Female External Urinary Catheter w/ Suction 08/24/24 0200 2 days              Line  Duration                  Pacer Wires 08/24/24 0044 2 days              Peripheral Intravenous Line  Duration                  Peripheral IV - Single Lumen 08/23/24 2302 20 G Posterior;Right Hand 2 days         Peripheral IV - Single Lumen 08/23/24 2317 20 G Anterior;Right Forearm 2 days         Peripheral IV - Single Lumen 08/26/24 0854 20 G Anterior;Left Forearm <1 day                       Physical Exam       Significant Labs:   Recent Lab Results         08/26/24  0217   08/25/24  0937        Albumin 3.3         ALP 98         ALT 47         Anion Gap 9         AST 29         Baso # 0.05         Basophil % 0.5         BILIRUBIN TOTAL 0.5  Comment: For infants and newborns, interpretation of results should be based  on gestational age, weight and in agreement with clinical  observations.    Premature Infant recommended reference ranges:  Up to 24 hours.............<8.0 mg/dL  Up to 48 hours............<12.0 " mg/dL  3-5 days..................<15.0 mg/dL  6-29 days.................<15.0 mg/dL           BUN 13         Calcium 9.1         Chloride 105         CO2 25         Creatinine 0.9         D-Dimer   1.01  Comment: The quantitative D-dimer assay should be used as an aid in   the diagnosis of deep vein thrombosis and pulmonary embolism  in patients with the appropriate presentation and clinical  history. The upper limit of the reference interval and the clinical   cut off   point are identical. Causes of a positive (>0.50 mg/L FEU) D-Dimer   test  include, but are not limited to: DVT, PE, DIC, thrombolytic   therapy, anticoagulant therapy, recent surgery, trauma, or   pregnancy, disseminated malignancy, aortic aneurysm, cirrhosis,  and severe infection. False negative results may occur in   patients with distal DVT.         Differential Method Automated         eGFR >60.0         Eos # 0.1         Eos % 1.2         Glucose 113         Gran # (ANC) 4.8         Gran % 51.2         Hematocrit 44.2         Hemoglobin 14.6         Immature Grans (Abs) 0.03  Comment: Mild elevation in immature granulocytes is non specific and   can be seen in a variety of conditions including stress response,   acute inflammation, trauma and pregnancy. Correlation with other   laboratory and clinical findings is essential.           Immature Granulocytes 0.3         Lymph # 3.7         Lymph % 38.6         MCH 29.7         MCHC 33.0         MCV 90         Mono # 0.8         Mono % 8.2         MPV 10.0         nRBC 0         Platelet Count 197         Potassium 4.1         PROTEIN TOTAL 6.2         RBC 4.92         RDW 12.6         Sodium 139         WBC 9.46

## 2024-08-26 NOTE — ASSESSMENT & PLAN NOTE
59 yo F with a hx of syncopal events (vs seizures) for the past 12-18 months admitted to the epilepsy monitoring unit for evaluation of these episodes. She had an event typical of some her syncopal events ( states she has different quality syncopal events) where she was noted to have sinus rhythm with high grade AV block. Tele concerning for phase 4 block. Baseline NSR with LBBB, QRS around 120ms. Prior Holter in 2023 with mostly SR, some ectopy, and a run of AT. TTE in 2023 with preserved EF.    Recommendations:  - TVP in place, threshold 1.0 mA, backup rate 40 bpm  - Plan for PPM today  - NPO  - Heparin products held  - Tele
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
## 2024-08-26 NOTE — BRIEF OP NOTE
: Jovanny King MD  Date of Procedure: 08/26/2024    Post-operative Diagnosis: complete heart block    Procedure Performed: Dual chamber permanent pacemaker implantation    Description of Procedure: The patient was brought to the EP lab in the fasting state. Prepped and draped in sterile fashion. Safety timeout was performed. Sedation administered by anesthesia staff. Selective venogram of the left axillary and cephalic veins performed via left ac IV. Lidocaine used for local anesthetic. Fluoroscopic guided axillary access utilized. Guide/J Wire advanced and confirmed in IVC. Incision made. Blunt and electrocautery dissection performed. Pocket made. Second fluoroscopic guided axillary access obtained. Guide/J wire advanced and confirmed in IVC. Peel away sheath advanced over J wire. RV lead advanced into RV. R waves and injury pattern adequate. Lead fixed into place and sutured in place. Second peel away sheath advanced over J wire. RA lead advanced into RA via peel away sheath. After adequate p waves and current of injury detected, the RA lead was fixed into place in the RA appendage. Peel away sheath removed and RA lead sutured into place. Pocket washed using antibiotic solution. Leads connected to generator. Generator placed into pocket, sutured in place, and washed with antibiotic solution. Deep layer closed with interrupted 3-0 suture. Intermediate layer closed with running 3-0 suture. Superficial layer closed with running 4-0 suture. Skin closed with Dermabond. Mepilex dressing to be placed after dermabond has dried.     EBL: <10 mL    Specimens: none  Complications: no immediate    Recommendations:  Antibiotics: Doxycycline 100 mg PO BID x 5 days to be delivered to patient's bedside prior to discharge  No heparin products for 5 days post-implant, including DOACs (warfarin ok to continue immediately post-procedure)  No lifting elbow above shoulder height on arm ipsilateral to pocket site for  6 weeks  No lifting over 10 lbs with arm ipsilateral to pocket site for 1 week  Sling to arm - wear for 48 hours post-procedure, then only at night for 6 weeks  mepilex - to be removed tomorrow  CXR (ordered)  ECG (ordered)  Follow up in device clinic in 1 week for device and wound checks    Emiliano Harrison MD  [facility]  Cardiovascular Disease, PGY-VII

## 2024-08-26 NOTE — SUBJECTIVE & OBJECTIVE
Interval History: EEG remains WNL, PBs overnight for anxiety with no EEG correlate. PPM planned for today. Discussed EEG findings and plan of care with patient and  at bedside, answered all questions.    Current Facility-Administered Medications   Medication Dose Route Frequency Provider Last Rate Last Admin    0.9%  NaCl infusion   Intravenous Continuous Gillies, Connor M, DO 10 mL/hr at 08/26/24 0900 Rate Verify at 08/26/24 0900    acetaminophen tablet 650 mg  650 mg Oral Q4H PRN Delfino Verma MD   650 mg at 08/25/24 0516    atorvastatin tablet 40 mg  40 mg Oral Daily Delfino Verma MD   40 mg at 08/26/24 0832    cholecalciferol (vitamin D3) 125 mcg (5,000 unit) tablet 5,000 Units  5,000 Units Oral Daily Dlefino Verma MD   5,000 Units at 08/26/24 0833    docusate sodium capsule 100 mg  100 mg Oral BID PRN Delfino Verma MD        hydrOXYzine pamoate capsule 50 mg  50 mg Oral BID Delfino Verma MD   50 mg at 08/26/24 0833    LORazepam injection 2 mg  2 mg Intravenous PRN Delfino Verma MD        methocarbamoL tablet 500 mg  500 mg Oral BID PRN Gillies, Connor M, DO   500 mg at 08/25/24 2102    nicotine 21 mg/24 hr 1 patch  1 patch Transdermal Daily PRN Nicole Guerrero PA-C        ondansetron disintegrating tablet 8 mg  8 mg Oral Q8H PRN Delfino Verma MD   8 mg at 08/24/24 1937    sodium chloride 0.9% flush 10 mL  10 mL Intravenous PRN Delfino Verma MD         Continuous Infusions:   0.9% NaCl   Intravenous Continuous 10 mL/hr at 08/26/24 0900 Rate Verify at 08/26/24 0900       Review of Systems  Objective:     Vital Signs (Most Recent):  Temp: 97.8 °F (36.6 °C) (08/26/24 0702)  Pulse: 81 (08/26/24 0900)  Resp: (!) 24 (08/26/24 0900)  BP: (!) 110/58 (08/26/24 0900)  SpO2: (!) 93 % (08/26/24 0900) Vital Signs (24h Range):  Temp:  [97.8 °F (36.6 °C)-98.4 °F (36.9 °C)] 97.8 °F (36.6 °C)  Pulse:  [69-97] 81  Resp:  [15-39] 24  SpO2:  [90 %-97 %] 93 %  BP: (107-163)/(55-94) 110/58     Weight: 85.3 kg  (188 lb 0.8 oz)  Body mass index is 33.31 kg/m².     Physical Exam  Vitals reviewed.   Constitutional:       General: She is not in acute distress.     Appearance: She is not diaphoretic.   HENT:      Head: Normocephalic and atraumatic.      Comments: EEG removed  Eyes:      General: No scleral icterus.        Right eye: No discharge.         Left eye: No discharge.      Extraocular Movements: Extraocular movements intact.      Conjunctiva/sclera: Conjunctivae normal.      Pupils: Pupils are equal, round, and reactive to light.   Cardiovascular:      Rate and Rhythm: Normal rate.   Pulmonary:      Effort: Pulmonary effort is normal. No respiratory distress.   Skin:     General: Skin is warm and dry.   Neurological:      General: No focal deficit present.      Mental Status: She is alert and oriented to person, place, and time. Mental status is at baseline.   Psychiatric:         Mood and Affect: Mood is anxious.         Behavior: Behavior normal. Behavior is cooperative.            NEUROLOGICAL EXAMINATION:     MENTAL STATUS   Oriented to person, place, and time.     CRANIAL NERVES     CN III, IV, VI   Pupils are equal, round, and reactive to light.       Awake, alert  RICHMOND OU   Corneal intact OU   + spontaneous eye opening   Face symmetric   Tongue midline   Moves all extremities     Exam findings to suggest seizures:  Myoclonus - no   eye twitching - no   Nystagmus - no   gaze deviation - no   waxy rigidity - no        Significant Labs: All pertinent lab results from the past 24 hours have been reviewed.    Significant Studies: I have reviewed all pertinent imaging results/findings within the past 24 hours.

## 2024-08-26 NOTE — PROGRESS NOTES
"[facility]  Cardiology  Progress Note    Patient Name: Vesna Richards  MRN: [id number]  Admission Date: 8/22/2024  Hospital Length of Stay: 4 days  Code Status: Full Code   Attending Physician: Quang Mcknight MD   Primary Care Physician: Emily Dumont MD  Expected Discharge Date: 8/28/2024  Principal Problem:Complete AV block    Subjective:     Hospital Course:   Patient was admitted to CCU for paroxysmal high grade AV block. TVP was put on place 10mA output, backuprate 40 bpm. Echo was done 8/24 of poor quality: LVE hyperdynamic Systolic function EF 70%.     Interval History: NAEON. Reports some a "tic" also discribed as a "gasp", intermittent,  non painful, located under left breast parasternal, onset after TTE, suspect in the setting of TV, no further compliants. DC PM today.       Objective:     Vital Signs (Most Recent):  Temp: 98.2 °F (36.8 °C) (08/25/24 2301)  Pulse: 74 (08/26/24 0600)  Resp: 17 (08/26/24 0600)  BP: 134/63 (08/26/24 0600)  SpO2: 95 % (08/26/24 0600) Vital Signs (24h Range):  Temp:  [98.2 °F (36.8 °C)-98.4 °F (36.9 °C)] 98.2 °F (36.8 °C)  Pulse:  [] 74  Resp:  [15-39] 17  SpO2:  [90 %-97 %] 95 %  BP: (107-146)/(55-68) 134/63     Weight: 85.3 kg (188 lb 0.8 oz)  Body mass index is 33.31 kg/m².     SpO2: 95 %         Intake/Output Summary (Last 24 hours) at 8/26/2024 0901  Last data filed at 8/26/2024 0600  Gross per 24 hour   Intake 209.86 ml   Output 750 ml   Net -540.14 ml       Lines/Drains/Airways       Central Venous Catheter Line  Duration                  Percutaneous Central Line - Cordis 08/24/24 0029 Internal Jugular Right 2 days              Drain  Duration             Female External Urinary Catheter w/ Suction 08/24/24 0200 2 days              Line  Duration                  Pacer Wires 08/24/24 0044 2 days              Peripheral Intravenous Line  Duration                  Peripheral IV - Single Lumen 08/23/24 2302 20 G Posterior;Right Hand 2 " days         Peripheral IV - Single Lumen 08/23/24 2317 20 G Anterior;Right Forearm 2 days         Peripheral IV - Single Lumen 08/26/24 0854 20 G Anterior;Left Forearm <1 day                       Physical Exam       Significant Labs:   Recent Lab Results         08/26/24  0217   08/25/24  0937        Albumin 3.3         ALP 98         ALT 47         Anion Gap 9         AST 29         Baso # 0.05         Basophil % 0.5         BILIRUBIN TOTAL 0.5  Comment: For infants and newborns, interpretation of results should be based  on gestational age, weight and in agreement with clinical  observations.    Premature Infant recommended reference ranges:  Up to 24 hours.............<8.0 mg/dL  Up to 48 hours............<12.0 mg/dL  3-5 days..................<15.0 mg/dL  6-29 days.................<15.0 mg/dL           BUN 13         Calcium 9.1         Chloride 105         CO2 25         Creatinine 0.9         D-Dimer   1.01  Comment: The quantitative D-dimer assay should be used as an aid in   the diagnosis of deep vein thrombosis and pulmonary embolism  in patients with the appropriate presentation and clinical  history. The upper limit of the reference interval and the clinical   cut off   point are identical. Causes of a positive (>0.50 mg/L FEU) D-Dimer   test  include, but are not limited to: DVT, PE, DIC, thrombolytic   therapy, anticoagulant therapy, recent surgery, trauma, or   pregnancy, disseminated malignancy, aortic aneurysm, cirrhosis,  and severe infection. False negative results may occur in   patients with distal DVT.         Differential Method Automated         eGFR >60.0         Eos # 0.1         Eos % 1.2         Glucose 113         Gran # (ANC) 4.8         Gran % 51.2         Hematocrit 44.2         Hemoglobin 14.6         Immature Grans (Abs) 0.03  Comment: Mild elevation in immature granulocytes is non specific and   can be seen in a variety of conditions including stress response,   acute  inflammation, trauma and pregnancy. Correlation with other   laboratory and clinical findings is essential.           Immature Granulocytes 0.3         Lymph # 3.7         Lymph % 38.6         MCH 29.7         MCHC 33.0         MCV 90         Mono # 0.8         Mono % 8.2         MPV 10.0         nRBC 0         Platelet Count 197         Potassium 4.1         PROTEIN TOTAL 6.2         RBC 4.92         RDW 12.6         Sodium 139         WBC 9.46             Physical Exam     Constitutional:       General: She is not in acute distress.     Appearance: Normal appearance. She is well-developed.   HENT:      Head: Normocephalic and atraumatic.      Mouth/Throat:      Mouth: Mucous membranes are moist.      Pharynx: Oropharynx is clear.   Eyes:      Extraocular Movements: Extraocular movements intact.      Pupils: Pupils are equal, round, and reactive to light.   Cardiovascular:      Rate and Rhythm: Normal rate and regular rhythm.      Pulses: Normal pulses.      Heart sounds: Normal heart sounds.   Pulmonary:      Effort: Pulmonary effort is normal. No respiratory distress.      Breath sounds: Normal breath sounds. No wheezing or rales.   Abdominal:      General: Abdomen is flat. There is no distension.      Palpations: Abdomen is soft.      Tenderness: There is no abdominal tenderness. There is no guarding.   Musculoskeletal:         General: No swelling. Normal range of motion.      Cervical back: Normal range of motion and neck supple.      Right lower leg: No edema.      Left lower leg: No edema.   Skin:     General: Skin is warm and dry.   Neurological:      Mental Status: She is alert and oriented to person, place, and time. Mental status is at baseline.   Psychiatric:         Mood and Affect: Mood normal.      Comments: Anxious    Assessment and Plan:     Brief HPI:     * High grade AV block  58yoF without much cardiac history but with a hx of syncopal events (vs seizures) for the past 12-18 months admitted to  the epillepsy monitoring unit for evaluation of these episodes. She had an event typical of some her syncopal events ( states she has different quality syncopal events) where she was noted to have sinus rhythm with high grade AV block. She had about 8 seconds of just P waves with no AV conduction. She denies any chest pain at baseline but occasionally has some chest pain after these episodes. There was some slowing of her sinus rhythm (all 1:1 AV conduction) prior to the event caught on telemetry, but she was resting in bed when this occurred. Some events happen after using the restroom, but some are completely random while at rest.     Baseline NSR with LBBB, QRS around 120ms  Prior Holter in 2023 with mostly SR, some ectopy, and a run of AT  TTE in 2023 with preserved EF  Not on AV yves blocking meds  No previous intra-cardiac procedures  Troponin negative  ECHO 8/24 Hyperdynamic systolic ,LVEF 70%, no other abnormalities.     Plan:  - TVP in place. Initial threshold 0.4 mA. Set to 10 mA output, backup rate 40 bpm  - Continuous tele  -  PPM today.    Convulsions  EEG's negative  Admitted on EMU  Epilepsy following  EEG off for TVP, can be re-placed if needed    Tobacco use  Encourage cessation    Hyperlipidemia  Continue home statin      Anxiety  -Currently on hydroxyzine PRN  -Consulted Psychology         VTE Risk Mitigation (From admission, onward)           Ordered     IP VTE HIGH RISK PATIENT  Once         08/22/24 1132     Place sequential compression device  Until discontinued         08/22/24 1133                    Ilia White MD  Cardiology  [facility]

## 2024-08-26 NOTE — PLAN OF CARE
SICU PLAN OF CARE    Dx: Complete AV block      Vital Signs (last 12 hours):   Temp:  [97.8 °F (36.6 °C)-98.5 °F (36.9 °C)]   Pulse:  [81-90]   Resp:  [18-38]   BP: (102-163)/(58-94)   SpO2:  [92 %-100 %]      Neuro: AAOx4, Follows commands , and Moves all extremities spontaneously     Cardiac: normal sinus rhythm      Respiratory: Room Air    Gtts: none    Urine Output: External Catheter  800 mL/shift    Diet: Clear Liquid       Skin:  All skin remains free from new injury, L upper chest incision dressing-CDI, patient turned q2h, bony prominences protected, and mattress inflated/working correctly.     Shift Events:  Permanent pacemaker placement. Plan of care ongoing, VSS, bed in lowet position, call light within reach, side rails x2. See flowsheet for further assessment/details.  Family updated on current condition/plan of care, questions answered, and emotional support provided.  MD updated on current condition, vitals, labs, and gtts.

## 2024-08-26 NOTE — NURSING
Pt up to SICU RM 03000. Charge nurse and Cards team notified of patient's arrival. Patient connected to cardiac monitor. VSS. Pt on 6L via face maskand transitioned to room air. SpO2 100%. Pt AAO x4. Left superior chest incision is intact with dermabond and foam dressing. Left arm sling in place. Bed in lowest position, side rails x2, call light within reach. Plan of care ongoing.
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
## 2024-08-26 NOTE — SUBJECTIVE & OBJECTIVE
Interval History:   Tele sinus with rate 60-80s, rare pacing  - Anxious overnight in the s/o procedure today    Objective:     Vital Signs (Most Recent):  Temp: 98.2 °F (36.8 °C) (08/25/24 2301)  Pulse: 74 (08/26/24 0600)  Resp: 17 (08/26/24 0600)  BP: 134/63 (08/26/24 0600)  SpO2: 95 % (08/26/24 0600) Vital Signs (24h Range):  Temp:  [98.2 °F (36.8 °C)-98.4 °F (36.9 °C)] 98.2 °F (36.8 °C)  Pulse:  [] 74  Resp:  [15-39] 17  SpO2:  [90 %-97 %] 95 %  BP: (107-168)/() 134/63     Weight: 85.3 kg (188 lb 0.8 oz)  Body mass index is 33.31 kg/m².     SpO2: 95 %        Physical Exam  Constitutional:       Appearance: Normal appearance.   HENT:      Mouth/Throat:      Mouth: Mucous membranes are moist.   Eyes:      Extraocular Movements: Extraocular movements intact.      Conjunctiva/sclera: Conjunctivae normal.   Cardiovascular:      Rate and Rhythm: Normal rate and regular rhythm.      Pulses: Normal pulses.   Pulmonary:      Effort: Pulmonary effort is normal.   Abdominal:      General: Abdomen is flat.      Palpations: Abdomen is soft.   Skin:     General: Skin is warm and dry.   Neurological:      General: No focal deficit present.      Mental Status: She is alert and oriented to person, place, and time.            Significant Labs: EP:   Recent Labs   Lab 08/25/24  0253 08/26/24  0217    139   K 4.1 4.1    105   CO2 23 25   * 113*   BUN 12 13   CREATININE 0.9 0.9   CALCIUM 9.4 9.1   PROT 6.6 6.2   ALBUMIN 3.5 3.3*   BILITOT 0.6 0.5   ALKPHOS 108 98   AST 30 29   ALT 45* 47*   ANIONGAP 10 9   WBC 10.99 9.46   HGB 15.7 14.6   HCT 47.4 44.2    197       Significant Imaging: Echocardiogram: Transthoracic echo (TTE) complete ([facility] Only):   Results for orders placed or performed during the hospital encounter of 08/22/24   Echo   Result Value Ref Range    BSA 1.96 m2    LVOT stroke volume 47.31 cm3    LVIDd 3.0 (A) 3.5 - 6.0 cm    LV Systolic Volume 6.98 mL    LV Systolic Volume Index  3.7 mL/m2    LVIDs 2.0 (A) 2.1 - 4.0 cm    LV Diastolic Volume 21.68 mL    LV Diastolic Volume Index 11.47 mL/m2    IVS 0.86 0.6 - 1.1 cm    LVOT diameter 1.88 cm    LVOT area 2.8 cm2    FS 33 28 - 44 %    Left Ventricle Relative Wall Thickness 0.65 cm    Posterior Wall 0.97 0.6 - 1.1 cm    LV mass 71.88 g    LV Mass Index 38 g/m2    MV Peak E Mak 0.45 m/s    TDI LATERAL 0.10 m/s    TDI SEPTAL 0.08 m/s    E/E' ratio 5.00 m/s    MV Peak A Mak 0.83 m/s    E/A ratio 0.54     IVRT 105.61 msec    E wave deceleration time 110.22 msec    LV SEPTAL E/E' RATIO 5.63 m/s    LA Volume Index 14.1 mL/m2    LV LATERAL E/E' RATIO 4.50 m/s    LA volume 26.60 cm3    LVOT peak mak 1.15 m/s    TAPSE 1.85 cm    LA size 2.23 cm    Left Atrium Minor Axis 4.89 cm    Left Atrium Major Axis 4.89 cm    LA volume (mod) 30.55 cm3    LA WIDTH 2.87 cm    LA Volume Index (Mod) 16.2 mL/m2    RA Major Axis 4.39 cm    RA Width 2.83 cm    AV mean gradient 5 mmHg    AV peak gradient 10 mmHg    Ao peak mak 1.57 m/s    Ao VTI 25.90 cm    LVOT peak VTI 17.05 cm    AV valve area 1.83 cm²    AV Velocity Ratio 0.73     AV index (prosthetic) 0.66     DAPHNE by Velocity Ratio 2.03 cm²    MV stenosis pressure 1/2 time 31.96 ms    MV valve area p 1/2 method 6.88 cm2    Sinus 2.84 cm    STJ 2.28 cm    Ascending aorta 2.06 cm    Mean e' 0.09 m/s    ZLVIDS -3.86     ZLVIDD -5.67     Est. RA pres 3 mmHg    Narrative      Small hyperdynamc LV cavity. Consider fluid resuscitation if clinically   indicated.    Overall the study quality was technically difficult. The study was   difficult due to patient's clinical status, body habitus and poor   endocardial visualization. linited valve visualization.    Left Ventricle: The left ventricle is normal in size. Normal wall   thickness. There is concentric remodeling. There is hyperdynamic systolic   function with a visually estimated ejection fraction of greater than 70%.   There is normal diastolic function. if lead present in  RV not well   visualized.    Left Ventricle: There is normal diastolic function. Normal left   ventricular filling pressure.    Right Ventricle: Right ventricle was not well visualized due to poor   acoustic window. Normal right ventricular cavity size. Wall thickness is   normal. Right ventricle wall motion  is normal. Systolic function is   normal.    Left Atrium: Normal left atrial size.    Right Atrium: Normal right atrial size.    Aorta: Aortic root is normal in size measuring 2.84 cm. Ascending aorta   is normal measuring 2.06 cm.    IVC/SVC: Normal venous pressure at 3 mmHg.    Prominent pericardial fat pad.

## 2024-08-27 ENCOUNTER — TELEPHONE (OUTPATIENT)
Dept: FAMILY MEDICINE | Facility: CLINIC | Age: 59
End: 2024-08-27
Payer: COMMERCIAL

## 2024-08-27 VITALS
TEMPERATURE: 98 F | RESPIRATION RATE: 65 BRPM | WEIGHT: 188.06 LBS | HEIGHT: 63 IN | OXYGEN SATURATION: 94 % | BODY MASS INDEX: 33.32 KG/M2 | DIASTOLIC BLOOD PRESSURE: 83 MMHG | HEART RATE: 94 BPM | SYSTOLIC BLOOD PRESSURE: 129 MMHG

## 2024-08-27 LAB
ALBUMIN SERPL BCP-MCNC: 3.3 G/DL (ref 3.5–5.2)
ALP SERPL-CCNC: 105 U/L (ref 55–135)
ALT SERPL W/O P-5'-P-CCNC: 35 U/L (ref 10–44)
ANION GAP SERPL CALC-SCNC: 10 MMOL/L (ref 8–16)
AST SERPL-CCNC: 22 U/L (ref 10–40)
BASOPHILS # BLD AUTO: 0.04 K/UL (ref 0–0.2)
BASOPHILS NFR BLD: 0.5 % (ref 0–1.9)
BILIRUB SERPL-MCNC: 0.5 MG/DL (ref 0.1–1)
BUN SERPL-MCNC: 10 MG/DL (ref 6–20)
CALCIUM SERPL-MCNC: 9.2 MG/DL (ref 8.7–10.5)
CHLORIDE SERPL-SCNC: 103 MMOL/L (ref 95–110)
CO2 SERPL-SCNC: 27 MMOL/L (ref 23–29)
CREAT SERPL-MCNC: 0.9 MG/DL (ref 0.5–1.4)
DIFFERENTIAL METHOD BLD: NORMAL
EOSINOPHIL # BLD AUTO: 0.1 K/UL (ref 0–0.5)
EOSINOPHIL NFR BLD: 1.7 % (ref 0–8)
ERYTHROCYTE [DISTWIDTH] IN BLOOD BY AUTOMATED COUNT: 12.8 % (ref 11.5–14.5)
EST. GFR  (NO RACE VARIABLE): >60 ML/MIN/1.73 M^2
GLUCOSE SERPL-MCNC: 101 MG/DL (ref 70–110)
HCT VFR BLD AUTO: 44.9 % (ref 37–48.5)
HGB BLD-MCNC: 15 G/DL (ref 12–16)
IMM GRANULOCYTES # BLD AUTO: 0.01 K/UL (ref 0–0.04)
IMM GRANULOCYTES NFR BLD AUTO: 0.1 % (ref 0–0.5)
LYMPHOCYTES # BLD AUTO: 2.5 K/UL (ref 1–4.8)
LYMPHOCYTES NFR BLD: 30.5 % (ref 18–48)
MCH RBC QN AUTO: 29.8 PG (ref 27–31)
MCHC RBC AUTO-ENTMCNC: 33.4 G/DL (ref 32–36)
MCV RBC AUTO: 89 FL (ref 82–98)
MONOCYTES # BLD AUTO: 0.7 K/UL (ref 0.3–1)
MONOCYTES NFR BLD: 8.5 % (ref 4–15)
NEUTROPHILS # BLD AUTO: 4.9 K/UL (ref 1.8–7.7)
NEUTROPHILS NFR BLD: 58.7 % (ref 38–73)
NRBC BLD-RTO: 0 /100 WBC
PLATELET # BLD AUTO: 191 K/UL (ref 150–450)
PMV BLD AUTO: 10 FL (ref 9.2–12.9)
POTASSIUM SERPL-SCNC: 4 MMOL/L (ref 3.5–5.1)
PROT SERPL-MCNC: 6.4 G/DL (ref 6–8.4)
RBC # BLD AUTO: 5.03 M/UL (ref 4–5.4)
SODIUM SERPL-SCNC: 140 MMOL/L (ref 136–145)
WBC # BLD AUTO: 8.27 K/UL (ref 3.9–12.7)

## 2024-08-27 PROCEDURE — 90832 PSYTX W PT 30 MINUTES: CPT | Mod: ,,, | Performed by: STUDENT IN AN ORGANIZED HEALTH CARE EDUCATION/TRAINING PROGRAM

## 2024-08-27 PROCEDURE — 94761 N-INVAS EAR/PLS OXIMETRY MLT: CPT

## 2024-08-27 PROCEDURE — 80053 COMPREHEN METABOLIC PANEL: CPT | Performed by: STUDENT IN AN ORGANIZED HEALTH CARE EDUCATION/TRAINING PROGRAM

## 2024-08-27 PROCEDURE — 25000003 PHARM REV CODE 250: Performed by: INTERNAL MEDICINE

## 2024-08-27 PROCEDURE — 25000003 PHARM REV CODE 250

## 2024-08-27 PROCEDURE — 85025 COMPLETE CBC W/AUTO DIFF WBC: CPT | Performed by: STUDENT IN AN ORGANIZED HEALTH CARE EDUCATION/TRAINING PROGRAM

## 2024-08-27 RX ORDER — DOXYCYCLINE HYCLATE 100 MG
100 TABLET ORAL EVERY 12 HOURS
Qty: 8 TABLET | Refills: 0 | Status: SHIPPED | OUTPATIENT
Start: 2024-08-27

## 2024-08-27 RX ADMIN — DOXYCYCLINE HYCLATE 100 MG: 100 TABLET, COATED ORAL at 08:08

## 2024-08-27 RX ADMIN — CHOLECALCIFEROL TAB 125 MCG (5000 UNIT) 5000 UNITS: 125 TAB at 08:08

## 2024-08-27 RX ADMIN — HYDROXYZINE PAMOATE 50 MG: 25 CAPSULE ORAL at 08:08

## 2024-08-27 RX ADMIN — ATORVASTATIN CALCIUM 40 MG: 40 TABLET, FILM COATED ORAL at 08:08

## 2024-08-27 NOTE — PLAN OF CARE
SICU PLAN OF CARE    Dx: Complete AV block    Goals of Care: step-down to CSU    Vital Signs (last 12 hours):   Temp:  [98.3 °F (36.8 °C)-98.5 °F (36.9 °C)]   Pulse:  [69-96]   Resp:  [10-50]   BP: (107-157)/(54-76)   SpO2:  [92 %-97 %]      Neuro: AAOx4    Cardiac: normal sinus rhythm    Respiratory: Room Air    Urine Output: External Catheter  1000 mL/shift    Diet: Clear Liquid      Labs/Accuchecks: Daily labs    Skin:  No new signs of skin breakdown.  Patient turned q2h, bony prominences protected, and mattress inflated/working correctly.   Bradford Score: 19. If Bradford Score is 16 or less, complete 4EYES note each shift.    Shift Events:  R-IJ Cordis removed. See flowsheet for further assessment/details.  Family updated on current condition/plan of care, questions answered, and emotional support provided.  MD updated on current condition, vitals, labs, and gtts.        Nurses Note -- 4 Eyes  8/27/2024   6:13 AM      Skin assessed during: Q Shift  [x] No Altered Skin Integrity Present    []Prevention Measures Documented  [] Yes- Altered Skin Integrity Present or Discovered   [] LDA Added if Not in Epic (Describe Wound)   [] New Altered Skin Integrity was Present on Admit and Documented in LDA   [] Wound Image Taken  Wound Care Consulted? No  Attending Nurse:  Alfredo HUTSON  Second RN/Staff Member:  Catia HUTSON
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
## 2024-08-27 NOTE — DISCHARGE SUMMARY
Pt discharged to home with spouse Jules Susie. AVS reviewed, confirmed, and printed. Pt wheeled to care per RNx1. Prescriptions received at bedside. All discharge and follow up instructions reviewed with pt and spouse. PIVs d/c. Tele box d/c. PPM arm sling in place.

## 2024-08-27 NOTE — PLAN OF CARE
[facility]  Initial Discharge Assessment       Primary Care Provider: Emily Dumont MD    Admission Diagnosis: Seizure-like activity [R56.9]  Complete AV block [I44.2]    Admission Date: 8/22/2024  Expected Discharge Date: 8/27/2024    Transition of Care Barriers: None    Payor: AETNA / Plan: [employer] OPEN ACCESS / Product Type: HMO /     Extended Emergency Contact Information  Primary Emergency Contact: Jules Richards   [place]  Mobile Phone: [phone]  Relation: Spouse    Discharge Plan A: Rehab  Discharge Plan B: Home, Home with family, Home Health      [id number] - [place], [place] - [address]  [address]  [place] [address]  Phone: [phone] Fax: [phone]      Initial Assessment (most recent)       Adult Discharge Assessment - 08/23/24 1221          Discharge Assessment    Assessment Type Discharge Planning Assessment     Confirmed/corrected address, phone number and insurance Yes     Confirmed Demographics Correct on Facesheet     Source of Information patient;family     When was your last doctors appointment? 05/15/24     Communicated ANN with patient/caregiver Date not available/Unable to determine     Reason For Admission epilepsy monitoring     People in Home spouse     Do you expect to return to your current living situation? Yes     Do you have help at home or someone to help you manage your care at home? Yes     Who are your caregiver(s) and their phone number(s)?  Jules Richards [phone]     Prior to hospitilization cognitive status: Alert/Oriented     Current cognitive status: Alert/Oriented     Walking or Climbing Stairs Difficulty no     Dressing/Bathing Difficulty yes     Dressing/Bathing bathing difficulty, requires equipment     Dressing/Bathing Management shower chair     Home Layout Able to live on 1st floor     Equipment Currently Used at Home bedside commode;cane, straight;shower chair     Readmission within 30 days?  No     Patient currently being followed by outpatient case management? No     Do you currently have service(s) that help you manage your care at home? No     Do you take prescription medications? Yes     Do you have prescription coverage? Yes     Coverage [employer]     Do you have any problems affording any of your prescribed medications? No     Is the patient taking medications as prescribed? yes     Who is going to help you get home at discharge?  Jules     How do you get to doctors appointments? family or friend will provide;car, drives self     Are you on dialysis? No     Do you take coumadin? No     Discharge Plan A Home with family     Discharge Plan B Home with family;Home Health     DME Needed Upon Discharge  none     Discharge Plan discussed with: Patient;Spouse/sig other     Transition of Care Barriers None        Physical Activity    On average, how many days per week do you engage in moderate to strenuous exercise (like a brisk walk)? 2 days     On average, how many minutes do you engage in exercise at this level? 20 min        Financial Resource Strain    How hard is it for you to pay for the very basics like food, housing, medical care, and heating? Not very hard        Housing Stability    In the last 12 months, was there a time when you were not able to pay the mortgage or rent on time? No     At any time in the past 12 months, were you homeless or living in a shelter (including now)? No        Transportation Needs    Has the lack of transportation kept you from medical appointments, meetings, work or from getting things needed for daily living? No        Food Insecurity    Within the past 12 months, you worried that your food would run out before you got the money to buy more. Sometimes true     Within the past 12 months, the food you bought just didn't last and you didn't have money to get more. Sometimes true        Stress    Do you feel stress - tense, restless, nervous, or anxious, or unable  to sleep at night because your mind is troubled all the time - these days? To some extent        Social Isolation    How often do you feel lonely or isolated from those around you?  Rarely        Alcohol Use    Q1: How often do you have a drink containing alcohol? Monthly or less     Q2: How many drinks containing alcohol do you have on a typical day when you are drinking? 1 or 2     Q3: How often do you have six or more drinks on one occasion? Never        Utilities    In the past 12 months has the electric, gas, oil, or water company threatened to shut off services in your home? No        Health Literacy    How often do you need to have someone help you when you read instructions, pamphlets, or other written material from your doctor or pharmacy? Never        OTHER    Name(s) of People in Home  Jules                   Patient discharged home with no stated needs

## 2024-08-27 NOTE — PLAN OF CARE
Pt liaison Miracle sent a message to clinic nurse at pt's primary care office to contact pt to schedule her hospital f/u visit.
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
## 2024-08-27 NOTE — TELEPHONE ENCOUNTER
----- Message from Miracle Smalls sent at 8/27/2024 10:48 AM CDT -----  Contact: Pt  [phone]  Nurse calling to schedule a hospital follow up.  There are no available appts for me to schedule.  Please call pt to schedule.

## 2024-08-27 NOTE — CARE UPDATE
EP Care Update    Patient s/p DC PPM implantation on 8/26/24 for complete heart block. Site assessed this AM and without hematoma/induration.     See recommendations regarding post implantation care in op note dated yesterday. Instructions communicated to the patient this AM.     Patient safe for discharge from EP perspective. EP will sign off, please call with questions or concerns.     MARJAN Coelho  Cardiovascular Disease fellow, PGY-4  [facility]

## 2024-08-28 ENCOUNTER — PATIENT MESSAGE (OUTPATIENT)
Dept: NEUROLOGY | Facility: CLINIC | Age: 59
End: 2024-08-28
Payer: COMMERCIAL

## 2024-08-28 ENCOUNTER — TELEPHONE (OUTPATIENT)
Dept: CARDIOLOGY | Facility: HOSPITAL | Age: 59
End: 2024-08-28
Payer: COMMERCIAL

## 2024-08-28 DIAGNOSIS — I44.2 CHB (COMPLETE HEART BLOCK): Primary | ICD-10-CM

## 2024-08-28 DIAGNOSIS — Z95.0 CARDIAC PACEMAKER IN SITU: ICD-10-CM

## 2024-08-28 NOTE — TELEPHONE ENCOUNTER
Called pt back on this afternoon.  Apparently pt was given an incorrect telephone # for the Arrhythmia/Device Clinics upon discharge from the Hospital.  Pt stated she got a little worked up (anxious) as when she tried to call the # she was given it kept saying the # was disconnected.  Provided the pt with the contact # for the Device Clinic and informed her the bandage over the implant site is to stay in place until she comes in for the one week post PPM implant appt.  Pt expressed now feeling calm since receiving my call. Scheduled one week appt with the pt on 9/4/24 @ 10:40 am.  Pt was agreeable with this date and time.  Understanding was verbalized.  Pt appreciated the call.

## 2024-08-29 DIAGNOSIS — I44.2 CHB (COMPLETE HEART BLOCK): Primary | ICD-10-CM

## 2024-08-29 DIAGNOSIS — Z95.0 CARDIAC PACEMAKER IN SITU: ICD-10-CM

## 2024-08-29 LAB
OHS QRS DURATION: 132 MS
OHS QRS DURATION: 138 MS
OHS QTC CALCULATION: 487 MS
OHS QTC CALCULATION: 491 MS

## 2024-08-30 ENCOUNTER — OFFICE VISIT (OUTPATIENT)
Dept: FAMILY MEDICINE | Facility: CLINIC | Age: 59
End: 2024-08-30
Payer: COMMERCIAL

## 2024-08-30 VITALS
HEART RATE: 86 BPM | HEIGHT: 63 IN | BODY MASS INDEX: 32.34 KG/M2 | SYSTOLIC BLOOD PRESSURE: 110 MMHG | TEMPERATURE: 98 F | OXYGEN SATURATION: 96 % | WEIGHT: 182.5 LBS | DIASTOLIC BLOOD PRESSURE: 65 MMHG

## 2024-08-30 DIAGNOSIS — Z95.0 S/P PLACEMENT OF CARDIAC PACEMAKER: ICD-10-CM

## 2024-08-30 DIAGNOSIS — I46.9 ASYSTOLE: ICD-10-CM

## 2024-08-30 DIAGNOSIS — T14.8XXA ABRASION: ICD-10-CM

## 2024-08-30 DIAGNOSIS — Z09 HOSPITAL DISCHARGE FOLLOW-UP: Primary | ICD-10-CM

## 2024-08-30 DIAGNOSIS — R07.9 CHEST PAIN, UNSPECIFIED TYPE: ICD-10-CM

## 2024-08-30 DIAGNOSIS — I44.2 COMPLETE AV BLOCK: ICD-10-CM

## 2024-08-30 DIAGNOSIS — R56.9 SEIZURE: ICD-10-CM

## 2024-08-30 PROCEDURE — 99999 PR PBB SHADOW E&M-EST. PATIENT-LVL IV: CPT | Mod: PBBFAC,,, | Performed by: NURSE PRACTITIONER

## 2024-08-30 RX ORDER — MUPIROCIN 20 MG/G
OINTMENT TOPICAL 3 TIMES DAILY
Qty: 22 G | Refills: 0 | Status: SHIPPED | OUTPATIENT
Start: 2024-08-30 | End: 2024-09-09

## 2024-08-30 NOTE — PATIENT INSTRUCTIONS
F/U with cardiology, neurology as advised  Continue current plan of care  Report to ER immediately if symptoms worsen or persist      Hi Vesna,     If you are due for any health screening(s) below please notify me so we can arrange them to be ordered and scheduled. Most healthy patients at your age complete them, but you are free to accept or refuse.     If you can't do it, I'll definitely understand. If you can, I'd certainly appreciate it!    Tests to Keep You Healthy    Mammogram: Met on 5/6/2024  Colon Cancer Screening: Met on 5/5/2024  Cervical Cancer Screening: DUE  Tobacco Cessation: NO      Your cervical cancer screening is due     Our records indicate that you may be overdue for your screening Pap smear. A Pap smear is an important health screening that can detect abnormal cells that can become cervical cancer. Cervical cancer screenings allow for early diagnosis and increase the likelihood of successful treatment.     The current recommendation for Pap smear screening is every 3-5 years for women at average risk. We encourage you to schedule your appointment with your womens health provider. Many women see a gynecologist for this screening, but some primary care providers also provide Pap screening.     If you recently had your Pap smear screening performed outside of [facility], please let your health care team know so that they can update your health record.

## 2024-08-31 NOTE — PROGRESS NOTES
Transitional Care Note  Subjective:       Patient ID: Vesna Richards is a 58 y.o. female.  Chief Complaint: Follow-up    Family and/or Caretaker present at visit?  Yes.  Diagnostic tests reviewed/disposition: No diagnosic tests pending after this hospitalization.  Disease/illness education: Hosp f/u  Home health/community services discussion/referrals: Patient does not have home health established from hospital visit.  They do not need home health.  If needed, we will set up home health for the patient.   Establishment or re-establishment of referral orders for community resources: No other necessary community resources.   Discussion with other health care providers: No discussion with other health care providers necessary.   [place] [facility] [place] with c/o seizure-like activity, syncopal episodes. She had been seen by neurology and cardiology in the past with workup for the syncopal episodes. Per hospital documentation, prior work up had been negative for any cardiac etiologies and the patient and her  stated they never had confirmation of seizures; pt was placed on anti-epileptic drugs and the events persisted frequently; neurology was concerned from ambulatory EEG monitoring that these events were non-epileptiform events; 72hr Holter about a year ago was negative besides some AT and ectopy; EF was 55% on echo a year ago as well. Pt was admitted for to the epilepsy monitoring unit (EMU) and her AEDs were held for monitoring; while in the EMU on 8/23 pt had an episode of convulsions and unresponsiveness while lying in bed; witnessed by the epileptologist; at time of aforementioned event, pt stated was feeling unwell and felt episode was coming on. Per hospital documentation, pt laid back in bed, had upper extremity convulsions, and periods of moaning for about 30 seconds prior to returning to her baseline (no post-ictal state). EEG was confirmed by the epileptologist to not have any epileptiform  activity. Telemetry was reviewed and the patient had some slight slowing of her sinus rhythm with 1:1 AV conduction, prior to having several P waves without any AV conduction (about 7-8 seconds of just P waves, no QRS's); pt returned to sinus rhythm with 1:1 AV conduction and was tachycardic for a few minutes; noted as sinus tachycardia. Cardiology consulted for evaluation due to high grade AV block; transferred to the CCU. TVP placed with 10mA output, backuprate 40 bpm; echo completed , noted to be poor quality, notable for EF 70%, otherwise largely normal; dual chamber PPM placed  without concern or complication; remained sinus rhythm with 1:1 conduction thereafter; EEG placed on admission for evaluation of her convulsions, which were negative for epileptiform activity; home AED's discontinued. Pt discharged home on 24 with rx for doxycyline; recommended f/u with neurology, cardiology. Pt states has f/u with cardiology scheduled; refuses neurology f/u.  Wound Check  She was originally treated 5 to 10 days ago (Small abrasion to left buttock; states since hospitalization; pt states from friction of external urinary catheter that she had while hospitalized.). The maximum temperature noted was less than 100.4 F. The temperature was taken using an oral thermometer. There has been no drainage (Small abrasion to left buttock) from the wound. The redness has not changed. There is no swelling present. The pain has not changed. She has no difficulty moving the affected extremity or digit.     Past Medical History:   Diagnosis Date    Intractable epilepsy without status epilepticus 01/10/2024    Seizures      Social History     Socioeconomic History    Marital status:    Tobacco Use    Smoking status: Former     Current packs/day: 0.00     Average packs/day: 2.2 packs/day for 82.6 years (179.5 ttl pk-yrs)     Types: Cigarettes     Start date:      Quit date: 2024     Years since quittin.0    Substance and Sexual Activity    Alcohol use: Not Currently    Drug use: Never    Sexual activity: Yes     Partners: Male     Social Determinants of Health     Financial Resource Strain: Low Risk  (2024)    Overall Financial Resource Strain (CARDIA)     Difficulty of Paying Living Expenses: Not very hard   Recent Concern: Financial Resource Strain - Medium Risk (2024)    Overall Financial Resource Strain (CARDIA)     Difficulty of Paying Living Expenses: Somewhat hard   Food Insecurity: Food Insecurity Present (2024)    Hunger Vital Sign     Worried About Running Out of Food in the Last Year: Sometimes true     Ran Out of Food in the Last Year: Sometimes true   Transportation Needs: No Transportation Needs (2024)    TRANSPORTATION NEEDS     Transportation : No   Physical Activity: Insufficiently Active (2024)    Exercise Vital Sign     Days of Exercise per Week: 2 days     Minutes of Exercise per Session: 20 min   Stress: Stress Concern Present (2024)    [employer] - Occupational Stress Questionnaire     Feeling of Stress : To some extent   Housing Stability: Low Risk  (2024)    Housing Stability Vital Sign     Unable to Pay for Housing in the Last Year: No     Homeless in the Last Year: No     Past Surgical History:   Procedure Laterality Date    A-V CARDIAC PACEMAKER INSERTION N/A 2024    Procedure: INSERTION, CARDIAC PACEMAKER, DUAL CHAMBER;  Surgeon: Jovanny King MD;  Location: [facility];  Service: Cardiology;  Laterality: N/A;  CHB, dPPM w/ LB, MDT, LILI, MB, CVICU [id number]    BREAST BIOPSY       SECTION         Review of Systems   Constitutional: Negative.    HENT: Negative.     Eyes: Negative.    Respiratory: Negative.     Cardiovascular: Negative.    Gastrointestinal: Negative.    Endocrine: Negative.    Genitourinary: Negative.    Musculoskeletal: Negative.    Skin: Negative.    Allergic/Immunologic: Negative.     Neurological: Negative.    Psychiatric/Behavioral: Negative.         Objective:      Physical Exam  Vitals and nursing note reviewed.   Constitutional:       Appearance: Normal appearance.   HENT:      Head: Normocephalic.      Right Ear: Tympanic membrane, ear canal and external ear normal.      Left Ear: Tympanic membrane, ear canal and external ear normal.      Nose: Nose normal.      Mouth/Throat:      Mouth: Mucous membranes are moist.      Pharynx: Oropharynx is clear.   Eyes:      Conjunctiva/sclera: Conjunctivae normal.      Pupils: Pupils are equal, round, and reactive to light.   Cardiovascular:      Rate and Rhythm: Normal rate and regular rhythm.      Pulses: Normal pulses.      Heart sounds: Normal heart sounds.   Pulmonary:      Effort: Pulmonary effort is normal.      Breath sounds: Normal breath sounds.   Abdominal:      General: Bowel sounds are normal.      Palpations: Abdomen is soft.   Musculoskeletal:         General: Normal range of motion.      Cervical back: Normal range of motion and neck supple.   Skin:     General: Skin is warm and dry.      Capillary Refill: Capillary refill takes 2 to 3 seconds.      Findings: Abrasion (left buttock) present.      Comments: Pacemaker with dressing in place to left upper chest   Neurological:      Mental Status: She is alert and oriented to person, place, and time.   Psychiatric:         Mood and Affect: Mood normal.         Behavior: Behavior normal.         Thought Content: Thought content normal.         Judgment: Judgment normal.         Assessment:       1. Hospital discharge follow-up    2. S/P placement of cardiac pacemaker    3. Complete AV block    4. Seizure    5. Asystole    6. Chest pain, unspecified type    7. Abrasion        Plan:           Vesna was seen today for follow-up.    Diagnoses and all orders for this visit:    Hospital discharge follow-up  S/P placement of cardiac pacemaker  Complete AV block  Seizure  Asystole  Chest pain,  unspecified type  F/U with cardiology, neurology as advised  Continue current plan of care  Report to ER immediately if symptoms worsen or persist    Abrasion  -     mupirocin (BACTROBAN) 2 % ointment; Apply topically 3 (three) times daily. for 10 days

## 2024-09-03 ENCOUNTER — PATIENT MESSAGE (OUTPATIENT)
Dept: FAMILY MEDICINE | Facility: CLINIC | Age: 59
End: 2024-09-03
Payer: COMMERCIAL

## 2024-09-03 ENCOUNTER — CLINICAL SUPPORT (OUTPATIENT)
Dept: CARDIOLOGY | Facility: HOSPITAL | Age: 59
End: 2024-09-03
Attending: INTERNAL MEDICINE
Payer: COMMERCIAL

## 2024-09-03 DIAGNOSIS — I44.2 CHB (COMPLETE HEART BLOCK): ICD-10-CM

## 2024-09-03 DIAGNOSIS — Z72.0 TOBACCO USE: Primary | ICD-10-CM

## 2024-09-03 DIAGNOSIS — Z95.0 CARDIAC PACEMAKER IN SITU: ICD-10-CM

## 2024-09-03 LAB
OHS QRS DURATION: 122 MS
OHS QTC CALCULATION: 456 MS

## 2024-09-03 PROCEDURE — 93005 ELECTROCARDIOGRAM TRACING: CPT | Performed by: INTERNAL MEDICINE

## 2024-09-03 PROCEDURE — 93280 PM DEVICE PROGR EVAL DUAL: CPT

## 2024-09-03 PROCEDURE — 93010 ELECTROCARDIOGRAM REPORT: CPT | Mod: ,,, | Performed by: INTERNAL MEDICINE

## 2024-09-03 NOTE — TELEPHONE ENCOUNTER
Hi,  Patient is asking about nicotine inhaler as an alternative nicotine replacement. States the insurance is requesting a prescription for coverage. I have not heard of this. Does a nicotine inhaler exist?    -Dr. Dumont

## 2024-09-04 NOTE — TELEPHONE ENCOUNTER
Regarding this:  Hi,  Patient is asking about nicotine inhaler as an alternative nicotine replacement. States the insurance is requesting a prescription for coverage. I have not heard of this. Does a nicotine inhaler exist?     -Dr. Dumont        Yes there is a Nicotrol Inhaler.  [employer] makes it, it does require an Rx. There is a specific way to use it.  You suck it and hold it in your mouth. The nicotine is absorbed through the saliva glands. Not inhaled.  It's one of the least preferred cessation tools, as it is still hand to mouth, but may be worth a try.  It is very expensive if not covered by insurance.    I'd be happy to tell you more about it if interested or needed.

## 2024-09-04 NOTE — TELEPHONE ENCOUNTER
No orders of the defined types were placed in this encounter.      Medications Ordered This Encounter   Medications    nicotine (NICOTROL) 10 mg Crtg     Sig: Inhale 1 puff into the lungs as needed.     Dispense:  168 each     Refill:  0

## 2024-09-05 ENCOUNTER — PATIENT MESSAGE (OUTPATIENT)
Dept: ADMINISTRATIVE | Facility: HOSPITAL | Age: 59
End: 2024-09-05
Payer: COMMERCIAL

## 2024-09-05 ENCOUNTER — PATIENT MESSAGE (OUTPATIENT)
Dept: ELECTROPHYSIOLOGY | Facility: CLINIC | Age: 59
End: 2024-09-05
Payer: COMMERCIAL

## 2024-09-05 ENCOUNTER — PATIENT MESSAGE (OUTPATIENT)
Dept: FAMILY MEDICINE | Facility: CLINIC | Age: 59
End: 2024-09-05
Payer: COMMERCIAL

## 2024-09-05 DIAGNOSIS — Z72.0 TOBACCO USE: ICD-10-CM

## 2024-09-05 NOTE — TELEPHONE ENCOUNTER
Care Due:                  Date            Visit Type   Department     Provider  --------------------------------------------------------------------------------                                MYCHART                              FOLLOWUP/OF  [facility]  Last Visit: 05-      [facility] VISIT   MEDICINE       Emily Dumont  Next Visit: None Scheduled  None         None Found                                                            Last  Test          Frequency    Reason                     Performed    Due Date  --------------------------------------------------------------------------------    Lipid Panel.  12 months..  atorvastatin.............  Not Found    Overdue    [employer] Embedded Care Due Messages. Reference number: [id number].   9/05/2024 2:00:46 PM CDT

## 2024-09-05 NOTE — TELEPHONE ENCOUNTER
Refill Routing Note   Medication(s) are not appropriate for processing by [facility] for the following reason(s):        Outside of protocol    [facility] action(s):  Route     Requires labs : Yes             Appointments  past 12m or future 3m with PCP    Date Provider   Last Visit   5/15/2024 Emily Dumont MD   Next Visit   Visit date not found Emily Dumont MD   ED visits in past 90 days: 0        Note composed:4:07 PM 09/05/2024

## 2024-09-06 RX ORDER — NICOTINE 4 MG/1
1 INHALANT RESPIRATORY (INHALATION)
Qty: 168 EACH | Refills: 0 | OUTPATIENT
Start: 2024-09-06

## 2024-09-09 ENCOUNTER — OFFICE VISIT (OUTPATIENT)
Dept: FAMILY MEDICINE | Facility: CLINIC | Age: 59
End: 2024-09-09
Payer: COMMERCIAL

## 2024-09-09 ENCOUNTER — LAB VISIT (OUTPATIENT)
Dept: LAB | Facility: HOSPITAL | Age: 59
End: 2024-09-09
Attending: STUDENT IN AN ORGANIZED HEALTH CARE EDUCATION/TRAINING PROGRAM
Payer: COMMERCIAL

## 2024-09-09 VITALS
HEIGHT: 63 IN | HEART RATE: 84 BPM | TEMPERATURE: 97 F | WEIGHT: 182.31 LBS | DIASTOLIC BLOOD PRESSURE: 66 MMHG | SYSTOLIC BLOOD PRESSURE: 110 MMHG | BODY MASS INDEX: 32.3 KG/M2 | OXYGEN SATURATION: 98 %

## 2024-09-09 DIAGNOSIS — F41.9 ANXIETY: Primary | ICD-10-CM

## 2024-09-09 DIAGNOSIS — E78.2 MIXED HYPERLIPIDEMIA: ICD-10-CM

## 2024-09-09 DIAGNOSIS — R73.09 ELEVATED GLUCOSE: ICD-10-CM

## 2024-09-09 DIAGNOSIS — G47.00 INSOMNIA, UNSPECIFIED TYPE: ICD-10-CM

## 2024-09-09 LAB
ALBUMIN SERPL BCP-MCNC: 3.6 G/DL (ref 3.5–5.2)
ALP SERPL-CCNC: 109 U/L (ref 55–135)
ALT SERPL W/O P-5'-P-CCNC: 55 U/L (ref 10–44)
ANION GAP SERPL CALC-SCNC: 12 MMOL/L (ref 8–16)
AST SERPL-CCNC: 29 U/L (ref 10–40)
BASOPHILS # BLD AUTO: 0.05 K/UL (ref 0–0.2)
BASOPHILS NFR BLD: 0.6 % (ref 0–1.9)
BILIRUB SERPL-MCNC: 0.3 MG/DL (ref 0.1–1)
BUN SERPL-MCNC: 12 MG/DL (ref 6–20)
CALCIUM SERPL-MCNC: 10 MG/DL (ref 8.7–10.5)
CHLORIDE SERPL-SCNC: 105 MMOL/L (ref 95–110)
CO2 SERPL-SCNC: 24 MMOL/L (ref 23–29)
CREAT SERPL-MCNC: 0.9 MG/DL (ref 0.5–1.4)
DIFFERENTIAL METHOD BLD: ABNORMAL
EOSINOPHIL # BLD AUTO: 0.1 K/UL (ref 0–0.5)
EOSINOPHIL NFR BLD: 1 % (ref 0–8)
ERYTHROCYTE [DISTWIDTH] IN BLOOD BY AUTOMATED COUNT: 13.1 % (ref 11.5–14.5)
EST. GFR  (NO RACE VARIABLE): >60 ML/MIN/1.73 M^2
ESTIMATED AVG GLUCOSE: 117 MG/DL (ref 68–131)
GLUCOSE SERPL-MCNC: 100 MG/DL (ref 70–110)
HBA1C MFR BLD: 5.7 % (ref 4–5.6)
HCT VFR BLD AUTO: 49 % (ref 37–48.5)
HGB BLD-MCNC: 14.9 G/DL (ref 12–16)
IMM GRANULOCYTES # BLD AUTO: 0.01 K/UL (ref 0–0.04)
IMM GRANULOCYTES NFR BLD AUTO: 0.1 % (ref 0–0.5)
LYMPHOCYTES # BLD AUTO: 2.7 K/UL (ref 1–4.8)
LYMPHOCYTES NFR BLD: 34.7 % (ref 18–48)
MCH RBC QN AUTO: 28.8 PG (ref 27–31)
MCHC RBC AUTO-ENTMCNC: 30.4 G/DL (ref 32–36)
MCV RBC AUTO: 95 FL (ref 82–98)
MONOCYTES # BLD AUTO: 0.5 K/UL (ref 0.3–1)
MONOCYTES NFR BLD: 6.5 % (ref 4–15)
NEUTROPHILS # BLD AUTO: 4.5 K/UL (ref 1.8–7.7)
NEUTROPHILS NFR BLD: 57.1 % (ref 38–73)
NRBC BLD-RTO: 0 /100 WBC
PLATELET # BLD AUTO: 323 K/UL (ref 150–450)
PMV BLD AUTO: 10.6 FL (ref 9.2–12.9)
POTASSIUM SERPL-SCNC: 5.1 MMOL/L (ref 3.5–5.1)
PROT SERPL-MCNC: 7.1 G/DL (ref 6–8.4)
RBC # BLD AUTO: 5.18 M/UL (ref 4–5.4)
SODIUM SERPL-SCNC: 141 MMOL/L (ref 136–145)
WBC # BLD AUTO: 7.9 K/UL (ref 3.9–12.7)

## 2024-09-09 PROCEDURE — 99213 OFFICE O/P EST LOW 20 MIN: CPT | Mod: S$GLB,,, | Performed by: NURSE PRACTITIONER

## 2024-09-09 PROCEDURE — 85025 COMPLETE CBC W/AUTO DIFF WBC: CPT | Performed by: STUDENT IN AN ORGANIZED HEALTH CARE EDUCATION/TRAINING PROGRAM

## 2024-09-09 PROCEDURE — 99999 PR PBB SHADOW E&M-EST. PATIENT-LVL IV: CPT | Mod: PBBFAC,,, | Performed by: NURSE PRACTITIONER

## 2024-09-09 PROCEDURE — 80053 COMPREHEN METABOLIC PANEL: CPT | Performed by: STUDENT IN AN ORGANIZED HEALTH CARE EDUCATION/TRAINING PROGRAM

## 2024-09-09 PROCEDURE — 83036 HEMOGLOBIN GLYCOSYLATED A1C: CPT | Performed by: STUDENT IN AN ORGANIZED HEALTH CARE EDUCATION/TRAINING PROGRAM

## 2024-09-09 PROCEDURE — 36415 COLL VENOUS BLD VENIPUNCTURE: CPT | Mod: PO | Performed by: STUDENT IN AN ORGANIZED HEALTH CARE EDUCATION/TRAINING PROGRAM

## 2024-09-09 RX ORDER — TALC
3 POWDER (GRAM) TOPICAL NIGHTLY
Qty: 30 TABLET | Refills: 0 | Status: SHIPPED | OUTPATIENT
Start: 2024-09-09 | End: 2024-10-09

## 2024-09-09 NOTE — PATIENT INSTRUCTIONS
Hydrate well  Rest  F/U with psychiatry  Thiago lipid today  Report to ER immediately if symptoms worsen or persist    Hi Vesna,     If you are due for any health screening(s) below please notify me so we can arrange them to be ordered and scheduled. Most healthy patients at your age complete them, but you are free to accept or refuse.     If you can't do it, I'll definitely understand. If you can, I'd certainly appreciate it!    Tests to Keep You Healthy    Mammogram: Met on 5/6/2024  Colon Cancer Screening: Met on 5/5/2024  Cervical Cancer Screening: DUE  Tobacco Cessation: Yes      Your cervical cancer screening is due     Our records indicate that you may be overdue for your screening Pap smear. A Pap smear is an important health screening that can detect abnormal cells that can become cervical cancer. Cervical cancer screenings allow for early diagnosis and increase the likelihood of successful treatment.     The current recommendation for Pap smear screening is every 3-5 years for women at average risk. We encourage you to schedule your appointment with your womens health provider. Many women see a gynecologist for this screening, but some primary care providers also provide Pap screening.     If you recently had your Pap smear screening performed outside of [facility], please let your health care team know so that they can update your health record.

## 2024-09-09 NOTE — PROGRESS NOTES
"Subjective     Patient ID: Vesna Richards is a 58 y.o. female.    Chief Complaint: Anxiety    Anxiety  Presents for initial visit. Onset was 1 to 5 years ago. The problem has been waxing and waning. Symptoms include excessive worry, nervous/anxious behavior and panic. Patient reports no chest pain, compulsions, confusion, decreased concentration, depressed mood, dizziness, dry mouth, dysphagia, dyspnea, feeling of choking, hyperventilation, insomnia, irritability, malaise, muscle tension, nausea, obsessions, palpitations, restlessness, shortness of breath or suicidal ideas. Primary symptoms comment: Pt states, "I've been having nightmares about my pacemaker possibly stopping or something happening if I'm at home by myself'. Symptoms occur most days. The severity of symptoms is moderate. The symptoms are aggravated by past trauma. The quality of sleep is fair. Nighttime awakenings: one to two.     Risk factors include recent illness and a major life event. Her past medical history is significant for anxiety/panic attacks. There is no history of anemia, arrhythmia, asthma, bipolar disorder, depression, hyperthyroidism, school failure or suicidal ideation. Past treatments include SSRIs and Psychiatry/Psychology referral (Pt states, "I don't want to take something that I have to be weaned off of"; states, "I just want to try some melatonin with the vistaril."). The treatment provided mild relief. Compliance with prior treatments has been good.     Past Medical History:   Diagnosis Date    Intractable epilepsy without status epilepticus 01/10/2024    Seizures      Social History     Socioeconomic History    Marital status:    Tobacco Use    Smoking status: Former     Current packs/day: 0.00     Average packs/day: 2.2 packs/day for 82.6 years (179.5 ttl pk-yrs)     Types: Cigarettes     Start date:      Quit date: 2024     Years since quittin.0   Substance and Sexual Activity    Alcohol use: Not " Currently    Drug use: Never    Sexual activity: Yes     Partners: Male     Social Determinants of Health     Financial Resource Strain: Low Risk  (2024)    Overall Financial Resource Strain (CARDIA)     Difficulty of Paying Living Expenses: Not very hard   Recent Concern: Financial Resource Strain - Medium Risk (2024)    Overall Financial Resource Strain (CARDIA)     Difficulty of Paying Living Expenses: Somewhat hard   Food Insecurity: Food Insecurity Present (2024)    Hunger Vital Sign     Worried About Running Out of Food in the Last Year: Sometimes true     Ran Out of Food in the Last Year: Sometimes true   Transportation Needs: No Transportation Needs (2024)    TRANSPORTATION NEEDS     Transportation : No   Physical Activity: Insufficiently Active (2024)    Exercise Vital Sign     Days of Exercise per Week: 2 days     Minutes of Exercise per Session: 20 min   Stress: Stress Concern Present (2024)    [employer] - Occupational Stress Questionnaire     Feeling of Stress : To some extent   Housing Stability: Low Risk  (2024)    Housing Stability Vital Sign     Unable to Pay for Housing in the Last Year: No     Homeless in the Last Year: No     Past Surgical History:   Procedure Laterality Date    A-V CARDIAC PACEMAKER INSERTION N/A 2024    Procedure: INSERTION, CARDIAC PACEMAKER, DUAL CHAMBER;  Surgeon: Jovanny King MD;  Location: [facility];  Service: Cardiology;  Laterality: N/A;  CHB, dPPM w/ LB, MDT, ANES, MB, CVICU [id number]    BREAST BIOPSY       SECTION         Review of Systems   Constitutional: Negative.  Negative for activity change, irritability and unexpected weight change.   HENT: Negative.  Negative for hearing loss and trouble swallowing.    Eyes: Negative.  Negative for discharge and visual disturbance.   Respiratory: Negative.  Negative for chest tightness, shortness of breath and wheezing.    Cardiovascular:  Negative.  Negative for chest pain and palpitations.   Gastrointestinal: Negative.  Negative for blood in stool, constipation, diarrhea, nausea and vomiting.   Endocrine: Negative.  Negative for polydipsia and polyuria.   Genitourinary: Negative.  Negative for difficulty urinating, dysuria, hematuria and menstrual problem.   Musculoskeletal: Negative.  Negative for arthralgias, joint swelling and neck pain.   Integumentary:  Negative.   Allergic/Immunologic: Negative.    Neurological: Negative.  Negative for dizziness, weakness and headaches.   Psychiatric/Behavioral:  Negative for confusion, decreased concentration, depressed mood, dysphoric mood and suicidal ideas. The patient is nervous/anxious. The patient does not have insomnia.           Objective     Physical Exam  Vitals and nursing note reviewed.   Constitutional:       Appearance: Normal appearance.   HENT:      Head: Normocephalic.      Right Ear: Tympanic membrane, ear canal and external ear normal.      Left Ear: Tympanic membrane, ear canal and external ear normal.      Nose: Nose normal.      Mouth/Throat:      Mouth: Mucous membranes are moist.      Pharynx: Oropharynx is clear.   Eyes:      Conjunctiva/sclera: Conjunctivae normal.      Pupils: Pupils are equal, round, and reactive to light.   Cardiovascular:      Rate and Rhythm: Normal rate and regular rhythm.      Pulses: Normal pulses.      Heart sounds: Normal heart sounds.   Pulmonary:      Effort: Pulmonary effort is normal.      Breath sounds: Normal breath sounds.   Abdominal:      General: Bowel sounds are normal.      Palpations: Abdomen is soft.   Musculoskeletal:         General: Normal range of motion.      Cervical back: Normal range of motion and neck supple.   Skin:     General: Skin is warm and dry.      Capillary Refill: Capillary refill takes 2 to 3 seconds.   Neurological:      Mental Status: She is alert and oriented to person, place, and time.   Psychiatric:         Attention  and Perception: Attention and perception normal.         Mood and Affect: Affect normal. Mood is anxious.         Speech: Speech normal.         Behavior: Behavior normal. Behavior is cooperative.         Thought Content: Thought content normal.         Cognition and Memory: Cognition and memory normal.         Judgment: Judgment normal.            Assessment and Plan     1. Anxiety  2. Insomnia, unspecified type  -     melatonin (MELATIN) 3 mg tablet; Take 1 tablet (3 mg total) by mouth every evening.  Dispense: 30 tablet; Refill: 0  Continue current medication  F/U with psychiatry  Report to ER immediately if symptoms worsen or persist               No follow-ups on file.

## 2024-09-10 ENCOUNTER — PATIENT MESSAGE (OUTPATIENT)
Dept: PSYCHIATRY | Facility: CLINIC | Age: 59
End: 2024-09-10
Payer: COMMERCIAL

## 2024-10-08 ENCOUNTER — CLINICAL SUPPORT (OUTPATIENT)
Dept: CARDIOLOGY | Facility: HOSPITAL | Age: 59
End: 2024-10-08
Payer: COMMERCIAL

## 2024-10-08 ENCOUNTER — CLINICAL SUPPORT (OUTPATIENT)
Dept: CARDIOLOGY | Facility: HOSPITAL | Age: 59
End: 2024-10-08
Attending: INTERNAL MEDICINE
Payer: COMMERCIAL

## 2024-10-08 DIAGNOSIS — I44.2 ATRIOVENTRICULAR BLOCK, COMPLETE: ICD-10-CM

## 2024-10-08 DIAGNOSIS — Z95.0 PRESENCE OF CARDIAC PACEMAKER: ICD-10-CM

## 2024-10-08 PROCEDURE — 93296 REM INTERROG EVL PM/IDS: CPT | Performed by: INTERNAL MEDICINE

## 2024-10-08 PROCEDURE — 93294 REM INTERROG EVL PM/LDLS PM: CPT | Mod: ,,, | Performed by: INTERNAL MEDICINE

## 2024-10-21 LAB
OHS CV AF BURDEN PERCENT: < 1
OHS CV DC REMOTE DEVICE TYPE: NORMAL
OHS CV RV PACING PERCENT: 0.04 %

## 2024-10-23 ENCOUNTER — PATIENT MESSAGE (OUTPATIENT)
Dept: FAMILY MEDICINE | Facility: CLINIC | Age: 59
End: 2024-10-23
Payer: COMMERCIAL

## 2024-10-24 DIAGNOSIS — F41.9 ANXIETY: ICD-10-CM

## 2024-10-24 NOTE — TELEPHONE ENCOUNTER
Refill Routing Note   Medication(s) are not appropriate for processing by [facility] for the following reason(s):        Outside of protocol    [facility] action(s):  Route               Appointments  past 12m or future 3m with PCP    Date Provider   Last Visit   5/15/2024 Emily Dumont MD   Next Visit   Visit date not found Emily Dumont MD   ED visits in past 90 days: 0        Note composed:2:51 PM 10/24/2024

## 2024-10-25 RX ORDER — HYDROXYZINE PAMOATE 50 MG/1
50 CAPSULE ORAL 2 TIMES DAILY
Qty: 60 CAPSULE | Refills: 11
Start: 2024-10-25 | End: 2025-10-20

## 2024-10-27 ENCOUNTER — PATIENT MESSAGE (OUTPATIENT)
Dept: ELECTROPHYSIOLOGY | Facility: CLINIC | Age: 59
End: 2024-10-27
Payer: COMMERCIAL

## 2024-10-31 ENCOUNTER — PATIENT MESSAGE (OUTPATIENT)
Dept: ELECTROPHYSIOLOGY | Facility: CLINIC | Age: 59
End: 2024-10-31
Payer: COMMERCIAL

## 2024-10-31 ENCOUNTER — TELEPHONE (OUTPATIENT)
Dept: CARDIOLOGY | Facility: HOSPITAL | Age: 59
End: 2024-10-31
Payer: COMMERCIAL

## 2024-10-31 ENCOUNTER — PATIENT MESSAGE (OUTPATIENT)
Dept: FAMILY MEDICINE | Facility: CLINIC | Age: 59
End: 2024-10-31
Payer: COMMERCIAL

## 2024-10-31 DIAGNOSIS — R00.2 PALPITATIONS: Primary | ICD-10-CM

## 2024-11-01 NOTE — TELEPHONE ENCOUNTER
Orders Placed This Encounter   Procedures    Ambulatory referral/consult to Cardiology     Standing Status:   Future     Standing Expiration Date:   12/1/2025     Referral Priority:   Routine     Referral Type:   Consultation     Referral Reason:   Specialty Services Required     Requested Specialty:   Cardiology     Number of Visits Requested:   1

## 2024-11-04 ENCOUNTER — TELEPHONE (OUTPATIENT)
Dept: PSYCHIATRY | Facility: CLINIC | Age: 59
End: 2024-11-04
Payer: COMMERCIAL

## 2024-11-04 ENCOUNTER — OFFICE VISIT (OUTPATIENT)
Dept: PSYCHIATRY | Facility: CLINIC | Age: 59
End: 2024-11-04
Payer: COMMERCIAL

## 2024-11-04 DIAGNOSIS — F41.1 GENERALIZED ANXIETY DISORDER: ICD-10-CM

## 2024-11-04 DIAGNOSIS — F19.11 HISTORY OF SUBSTANCE ABUSE: ICD-10-CM

## 2024-11-04 DIAGNOSIS — F43.10 PTSD (POST-TRAUMATIC STRESS DISORDER): Primary | ICD-10-CM

## 2024-11-04 PROCEDURE — 90834 PSYTX W PT 45 MINUTES: CPT | Mod: 95,,, | Performed by: SOCIAL WORKER
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
## 2024-11-08 ENCOUNTER — OFFICE VISIT (OUTPATIENT)
Dept: PSYCHIATRY | Facility: CLINIC | Age: 59
End: 2024-11-08
Payer: COMMERCIAL

## 2024-11-08 DIAGNOSIS — R69 DIAGNOSIS DEFERRED: Primary | ICD-10-CM

## 2024-11-08 PROCEDURE — 99499 UNLISTED E&M SERVICE: CPT | Mod: 95,,, | Performed by: SOCIAL WORKER

## 2024-11-08 NOTE — PROGRESS NOTES
This patient signed onto her virtual appointment but stated that she had made the appointment by mistake since we only met 4 days ago.  She had intended to cancel this appointment but did not want me to think she had forgotten about it.  We quickly got off the phone and she stated she would make a follow-up appointment in the future.  This appointment has no level of service.   INR/BMP/PT/PTT/EKG/CBC/Type and Screen

## 2024-11-11 ENCOUNTER — OFFICE VISIT (OUTPATIENT)
Dept: PSYCHIATRY | Facility: CLINIC | Age: 59
End: 2024-11-11
Payer: COMMERCIAL

## 2024-11-11 VITALS — DIASTOLIC BLOOD PRESSURE: 79 MMHG | SYSTOLIC BLOOD PRESSURE: 140 MMHG | HEART RATE: 120 BPM

## 2024-11-11 DIAGNOSIS — F43.10 PTSD (POST-TRAUMATIC STRESS DISORDER): ICD-10-CM

## 2024-11-11 DIAGNOSIS — F41.1 GENERALIZED ANXIETY DISORDER: ICD-10-CM

## 2024-11-11 DIAGNOSIS — F40.01 PANIC DISORDER WITH AGORAPHOBIA: ICD-10-CM

## 2024-11-11 DIAGNOSIS — F32.9 MAJOR DEPRESSIVE EPISODE: Primary | ICD-10-CM

## 2024-11-11 DIAGNOSIS — F19.11 HISTORY OF SUBSTANCE ABUSE: ICD-10-CM

## 2024-11-11 PROCEDURE — 99215 OFFICE O/P EST HI 40 MIN: CPT | Mod: S$GLB,,, | Performed by: PSYCHIATRY & NEUROLOGY

## 2024-11-11 PROCEDURE — 99999 PR PBB SHADOW E&M-EST. PATIENT-LVL II: CPT | Mod: PBBFAC,,, | Performed by: PSYCHIATRY & NEUROLOGY

## 2024-11-11 RX ORDER — DULOXETIN HYDROCHLORIDE 20 MG/1
CAPSULE, DELAYED RELEASE ORAL
Qty: 60 CAPSULE | Refills: 1 | Status: SHIPPED | OUTPATIENT
Start: 2024-11-11 | End: 2024-11-14 | Stop reason: SINTOL
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
## 2024-11-13 ENCOUNTER — OFFICE VISIT (OUTPATIENT)
Dept: FAMILY MEDICINE | Facility: CLINIC | Age: 59
End: 2024-11-13
Payer: COMMERCIAL

## 2024-11-13 ENCOUNTER — HOSPITAL ENCOUNTER (OUTPATIENT)
Dept: RADIOLOGY | Facility: HOSPITAL | Age: 59
Discharge: HOME | End: 2024-11-13
Attending: STUDENT IN AN ORGANIZED HEALTH CARE EDUCATION/TRAINING PROGRAM
Payer: COMMERCIAL

## 2024-11-13 VITALS
SYSTOLIC BLOOD PRESSURE: 132 MMHG | OXYGEN SATURATION: 94 % | HEIGHT: 63 IN | DIASTOLIC BLOOD PRESSURE: 75 MMHG | WEIGHT: 176.63 LBS | BODY MASS INDEX: 31.3 KG/M2 | HEART RATE: 96 BPM | TEMPERATURE: 98 F

## 2024-11-13 DIAGNOSIS — Z72.0 TOBACCO USE: Primary | ICD-10-CM

## 2024-11-13 DIAGNOSIS — J40 BRONCHITIS: ICD-10-CM

## 2024-11-13 PROCEDURE — 99999 PR PBB SHADOW E&M-EST. PATIENT-LVL V: CPT | Mod: PBBFAC,,, | Performed by: STUDENT IN AN ORGANIZED HEALTH CARE EDUCATION/TRAINING PROGRAM

## 2024-11-13 PROCEDURE — 71046 X-RAY EXAM CHEST 2 VIEWS: CPT | Mod: 26,,, | Performed by: RADIOLOGY

## 2024-11-13 PROCEDURE — 71046 X-RAY EXAM CHEST 2 VIEWS: CPT | Mod: TC,PO

## 2024-11-13 RX ORDER — LEVOFLOXACIN 500 MG/1
500 TABLET, FILM COATED ORAL DAILY
Qty: 10 TABLET | Refills: 0 | Status: SHIPPED | OUTPATIENT
Start: 2024-11-13 | End: 2024-11-23

## 2024-11-13 RX ORDER — METHYLPREDNISOLONE 4 MG/1
TABLET ORAL
Qty: 21 TABLET | Refills: 0 | Status: SHIPPED | OUTPATIENT
Start: 2024-11-13

## 2024-11-13 NOTE — PATIENT INSTRUCTIONS
Michael Malagon,     If you are due for any health screening(s) below please notify me so we can arrange them to be ordered and scheduled. Most healthy patients at your age complete them, but you are free to accept or refuse.     If you can't do it, I'll definitely understand. If you can, I'd certainly appreciate it!    Tests to Keep You Healthy    Mammogram: Met on 5/6/2024  Colon Cancer Screening: Met on 5/5/2024  Cervical Cancer Screening: DUE  Tobacco Cessation: Yes      Your cervical cancer screening is due     Our records indicate that you may be overdue for your screening Pap smear. A Pap smear is an important health screening that can detect abnormal cells that can become cervical cancer. Cervical cancer screenings allow for early diagnosis and increase the likelihood of successful treatment.     The current recommendation for Pap smear screening is every 3-5 years for women at average risk. We encourage you to schedule your appointment with your [facility] health provider. Many women see a gynecologist for this screening, but some primary care providers also provide Pap screening.     If you recently had your Pap smear screening performed outside of [facility], please let your health care team know so that they can update your health record.      Were here to help you quit smoking     Our records indicated that you are still smoking. One of the best things you can do for your health is to stop smoking and we are here to help.     Talk with your provider about our Smoking Cessation Program and how we can support you on your journey.

## 2024-11-13 NOTE — PROGRESS NOTES
SUBJECTIVE:   Vesna Richards is a 59 y.o. female who complains of congestion, post nasal drip, night sweats, productive cough, left, upper sinus pain, chills, and hoarseness for 3 days. She denies a history of chest pain and vomiting and does not have a history of asthma. Patient does smoke cigarettes.      OBJECTIVE:  Vitals:    11/13/24 1053   BP:    Pulse: 96   Temp:      Constitutional:  No acute distress  HEENT:  Head normocephalic and atraumatic. PERRL, EOMI. No scleral icterus or erythema.   R ear: The pinna and ear canal wnl. TM wnl, non bulging, no fluid  L ear: The pinna and ear canal wnl. TM wnl, non bulging, no fluid  Pharynx moist, no exudate, mildly erythematous   Neck: Normal range of motion. Neck supple. No LAD  Cardiovascular: Normal rate  Pulmonary/Chest: Effort normal. No respiratory distress. CTAB, no wheezing  Musculoskeletal: Normal range of motion.  Neurological: CN II-XII intact  Skin: warm and dry.   Psychiatric: normal mood and affect. behavior is normal. .    ASSESSMENT:   bronchitis    PLAN:  Symptomatic therapy suggested: push fluids, rest, and return office visit prn if symptoms persist or worsen.   Call or return to clinic prn if these symptoms worsen or fail to improve as anticipated.    Problem List Items Addressed This Visit          Other    Tobacco use - Primary    Overview     Assistance with smoking cessation was offered, including:  [x]  Medications  [x]  Counseling  []  Printed Information on Smoking Cessation  [x]  Referral to a Smoking Cessation Program    Patient was counseled regarding smoking for 3-10 minutes.  Smoking about 1ppd, about 44 pack year smoking hx  Reports she is working to cont cutting down         Relevant Orders    Ambulatory referral/consult to Smoking Cessation Program     Other Visit Diagnoses       Bronchitis        Relevant Medications    levoFLOXacin (LEVAQUIN) 500 MG tablet    methylPREDNISolone (MEDROL DOSEPACK) 4 mg tablet    Other Relevant Orders     X-Ray Chest PA And Lateral (Completed)

## 2024-11-14 ENCOUNTER — PATIENT MESSAGE (OUTPATIENT)
Dept: PSYCHIATRY | Facility: CLINIC | Age: 59
End: 2024-11-14
Payer: COMMERCIAL

## 2024-11-14 DIAGNOSIS — F41.1 GENERALIZED ANXIETY DISORDER: ICD-10-CM

## 2024-11-14 DIAGNOSIS — F43.10 PTSD (POST-TRAUMATIC STRESS DISORDER): ICD-10-CM

## 2024-11-14 DIAGNOSIS — F40.01 PANIC DISORDER WITH AGORAPHOBIA: ICD-10-CM

## 2024-11-14 DIAGNOSIS — F32.9 MAJOR DEPRESSIVE EPISODE: Primary | ICD-10-CM

## 2024-11-14 RX ORDER — PAROXETINE HYDROCHLORIDE 20 MG/1
20 TABLET, FILM COATED ORAL EVERY MORNING
Qty: 90 TABLET | Refills: 0 | Status: SHIPPED | OUTPATIENT
Start: 2024-11-14

## 2024-11-14 NOTE — TELEPHONE ENCOUNTER
Communication with Dr. King, the patient's cardiologist, indicates that Paxil is acceptable from a cardiology standpoint at this time.  I called the patient, and she reasonably wishes to restart the medication.  Risks and side effects were again reviewed with the patient, and the patient demonstrates understanding and recall from before.  Cymbalta has been discontinued.  Paxil 20 mg daily.

## 2024-11-18 ENCOUNTER — OFFICE VISIT (OUTPATIENT)
Dept: FAMILY MEDICINE | Facility: CLINIC | Age: 59
End: 2024-11-18
Payer: COMMERCIAL

## 2024-11-18 ENCOUNTER — LAB VISIT (OUTPATIENT)
Dept: LAB | Facility: HOSPITAL | Age: 59
End: 2024-11-18
Attending: STUDENT IN AN ORGANIZED HEALTH CARE EDUCATION/TRAINING PROGRAM
Payer: COMMERCIAL

## 2024-11-18 ENCOUNTER — PATIENT MESSAGE (OUTPATIENT)
Dept: FAMILY MEDICINE | Facility: CLINIC | Age: 59
End: 2024-11-18

## 2024-11-18 VITALS
HEIGHT: 63 IN | SYSTOLIC BLOOD PRESSURE: 135 MMHG | WEIGHT: 174.38 LBS | HEART RATE: 92 BPM | OXYGEN SATURATION: 95 % | TEMPERATURE: 98 F | RESPIRATION RATE: 18 BRPM | DIASTOLIC BLOOD PRESSURE: 81 MMHG | BODY MASS INDEX: 30.9 KG/M2

## 2024-11-18 DIAGNOSIS — R94.31 ABNORMAL EKG: ICD-10-CM

## 2024-11-18 DIAGNOSIS — F41.9 ANXIETY: Primary | ICD-10-CM

## 2024-11-18 DIAGNOSIS — R07.9 CHEST PAIN, UNSPECIFIED TYPE: ICD-10-CM

## 2024-11-18 DIAGNOSIS — R30.0 DYSURIA: ICD-10-CM

## 2024-11-18 DIAGNOSIS — F33.1 MODERATE EPISODE OF RECURRENT MAJOR DEPRESSIVE DISORDER: ICD-10-CM

## 2024-11-18 DIAGNOSIS — F41.9 ANXIETY: ICD-10-CM

## 2024-11-18 DIAGNOSIS — M62.838 MUSCLE SPASM: ICD-10-CM

## 2024-11-18 LAB
ANION GAP SERPL CALC-SCNC: 10 MMOL/L (ref 8–16)
BUN SERPL-MCNC: 9 MG/DL (ref 6–20)
CALCIUM SERPL-MCNC: 9.5 MG/DL (ref 8.7–10.5)
CHLORIDE SERPL-SCNC: 103 MMOL/L (ref 95–110)
CO2 SERPL-SCNC: 28 MMOL/L (ref 23–29)
CREAT SERPL-MCNC: 1 MG/DL (ref 0.5–1.4)
EST. GFR  (NO RACE VARIABLE): >60 ML/MIN/1.73 M^2
GLUCOSE SERPL-MCNC: 119 MG/DL (ref 70–110)
OHS QRS DURATION: 116 MS
OHS QTC CALCULATION: 459 MS
POTASSIUM SERPL-SCNC: 4.2 MMOL/L (ref 3.5–5.1)
SODIUM SERPL-SCNC: 141 MMOL/L (ref 136–145)

## 2024-11-18 PROCEDURE — G2211 COMPLEX E/M VISIT ADD ON: HCPCS | Mod: S$GLB,,, | Performed by: STUDENT IN AN ORGANIZED HEALTH CARE EDUCATION/TRAINING PROGRAM

## 2024-11-18 PROCEDURE — 99999 PR PBB SHADOW E&M-EST. PATIENT-LVL IV: CPT | Mod: PBBFAC,,, | Performed by: STUDENT IN AN ORGANIZED HEALTH CARE EDUCATION/TRAINING PROGRAM

## 2024-11-18 PROCEDURE — 36415 COLL VENOUS BLD VENIPUNCTURE: CPT | Mod: PO | Performed by: STUDENT IN AN ORGANIZED HEALTH CARE EDUCATION/TRAINING PROGRAM

## 2024-11-18 PROCEDURE — 93010 ELECTROCARDIOGRAM REPORT: CPT | Mod: S$GLB,,, | Performed by: INTERNAL MEDICINE

## 2024-11-18 PROCEDURE — 93005 ELECTROCARDIOGRAM TRACING: CPT | Mod: S$GLB,,, | Performed by: STUDENT IN AN ORGANIZED HEALTH CARE EDUCATION/TRAINING PROGRAM

## 2024-11-18 PROCEDURE — 99214 OFFICE O/P EST MOD 30 MIN: CPT | Mod: S$GLB,,, | Performed by: STUDENT IN AN ORGANIZED HEALTH CARE EDUCATION/TRAINING PROGRAM

## 2024-11-18 PROCEDURE — 80048 BASIC METABOLIC PNL TOTAL CA: CPT | Performed by: STUDENT IN AN ORGANIZED HEALTH CARE EDUCATION/TRAINING PROGRAM

## 2024-11-18 RX ORDER — METHOCARBAMOL 750 MG/1
750 TABLET, FILM COATED ORAL 4 TIMES DAILY
Qty: 40 TABLET | Refills: 3 | Status: SHIPPED | OUTPATIENT
Start: 2024-11-18

## 2024-11-18 RX ORDER — HYDROXYZINE PAMOATE 50 MG/1
50 CAPSULE ORAL 3 TIMES DAILY PRN
COMMUNITY
End: 2024-11-18 | Stop reason: SDUPTHER

## 2024-11-18 RX ORDER — HYDROXYZINE PAMOATE 50 MG/1
50 CAPSULE ORAL EVERY 8 HOURS PRN
Qty: 90 CAPSULE | Refills: 3 | Status: SHIPPED | OUTPATIENT
Start: 2024-11-18

## 2024-11-18 NOTE — PATIENT INSTRUCTIONS
Michael Malagon,     If you are due for any health screening(s) below please notify me so we can arrange them to be ordered and scheduled. Most healthy patients at your age complete them, but you are free to accept or refuse.     If you can't do it, I'll definitely understand. If you can, I'd certainly appreciate it!    Tests to Keep You Healthy    Mammogram: Met on 5/6/2024  Colon Cancer Screening: Met on 5/5/2024  Cervical Cancer Screening: DUE  Tobacco Cessation: NO      Your cervical cancer screening is due     Our records indicate that you may be overdue for your screening Pap smear. A Pap smear is an important health screening that can detect abnormal cells that can become cervical cancer. Cervical cancer screenings allow for early diagnosis and increase the likelihood of successful treatment.     The current recommendation for Pap smear screening is every 3-5 years for women at average risk. We encourage you to schedule your appointment with your [facility] health provider. Many women see a gynecologist for this screening, but some primary care providers also provide Pap screening.     If you recently had your Pap smear screening performed outside of [facility], please let your health care team know so that they can update your health record.      Were here to help you quit smoking     Our records indicated that you are still smoking. One of the best things you can do for your health is to stop smoking and we are here to help.     Talk with your provider about our Smoking Cessation Program and how we can support you on your journey.

## 2024-11-19 ENCOUNTER — TELEPHONE (OUTPATIENT)
Dept: PSYCHIATRY | Facility: CLINIC | Age: 59
End: 2024-11-19
Payer: COMMERCIAL

## 2024-11-19 ENCOUNTER — PATIENT MESSAGE (OUTPATIENT)
Dept: CARDIOLOGY | Facility: HOSPITAL | Age: 59
End: 2024-11-19
Payer: COMMERCIAL

## 2024-11-19 ENCOUNTER — PATIENT MESSAGE (OUTPATIENT)
Dept: PSYCHIATRY | Facility: CLINIC | Age: 59
End: 2024-11-19
Payer: COMMERCIAL

## 2024-11-19 NOTE — PROGRESS NOTES
I have sent a msg to patient with the following interpretation (see below):    - Your metabolic panel (electrolytes, glucose, kidney function) nml, except elevated glucose. Be mindful of sugar intake    Please do not hesitate to call or message with any questions or concerns    Emily Dumont MD

## 2024-12-05 ENCOUNTER — TELEPHONE (OUTPATIENT)
Dept: SMOKING CESSATION | Facility: CLINIC | Age: 59
End: 2024-12-05
Payer: COMMERCIAL

## 2024-12-05 NOTE — TELEPHONE ENCOUNTER
Attempted to call patient to reschedule their smoking cessation appointment. Left a message with my contact information to reschedule the appointment. Audrey Mejia [phone].

## 2024-12-07 ENCOUNTER — PATIENT MESSAGE (OUTPATIENT)
Dept: FAMILY MEDICINE | Facility: CLINIC | Age: 59
End: 2024-12-07
Payer: COMMERCIAL

## 2024-12-08 NOTE — PROGRESS NOTES
Problem List Items Addressed This Visit          Psychiatric    Anxiety - Primary    Overview     Chronic history; hydroxyzine BID  Denies SI/HI; no hallucinations   Follows with psych appt    Assoc panic attacks             Relevant Medications    hydrOXYzine pamoate (VISTARIL) 50 MG Cap    Other Relevant Orders    Basic Metabolic Panel    Moderate episode of recurrent major depressive disorder    Overview     Chronic hx; active, anxiety worse  Follows with psych          Other Visit Diagnoses       Chest pain, unspecified type        Relevant Orders    IN OFFICE EKG 12-LEAD (to Muse) (Completed)    Basic Metabolic Panel    Nuclear Stress - [facility]    Muscle spasm        Relevant Medications    methocarbamoL (ROBAXIN) 750 MG Tab    Dysuria        Relevant Orders    Urinalysis, Reflex to Urine Culture Urine, Clean Catch    Abnormal EKG        Relevant Orders    Nuclear Stress - [facility]             Assessment & Plan    TREMORS (ABNORMAL HEAD MOVEMENTS):  Attributed tremors to recent medication changes, particularly suspecting Cymbalta as the likely cause.  Considered possibility of orthostatic issues or dehydration contributing to tremors and blood pressure fluctuations.  Assessed for potential serious causes of tremors (e.g., MS, Parkinson's) but found no immediate indications based on recent normal brain MRI.    CHEST PAIN (INTERCOSTAL PAIN / CARDIAC PACEMAKER COMPLICATION):  Evaluated chest pressure and soreness near pacemaker site, suspecting it may be part of the healing process.  Considered muscle-related etiology for chest discomfort.      NICOTINE DEPENDENCE:  Patient to avoid smoking for a few days.    DEHYDRATION:  Recommend increasing water intake to stay hydrated.    ANXIETY AND DEPRESSION MANAGEMENT:  Discontinued Paxil.  Discontinued Cymbalta.  Referred to psychiatry for an earlier appointment than January.    INSOMNIA:  Restarted hydroxyzine for sleep.    DIAGNOSTIC TESTS:  EKG ordered.  Blood  "test for electrolytes ordered.  Urinalysis ordered.            Emily Dumont MD  _________________________________________________________________________      Patient ID: Vesna Richards is a 59 y.o. female.    History of Present Illness    CHIEF COMPLAINT:  Patient presents today for follow-up on tremors and medication side effects.    ANXIETY AND DEPRESSION:  She has a history of multiple mental health conditions, including PTSD, anxiety, depression, and agoraphobia, which significantly impact her daily activities. She reports recent medication changes for anxiety management. She started Cymbalta four nights ago and experienced a "trippy night". Subsequently, she switched to Paxil on Friday night, noting improved sleep. However, she began experiencing tremors on Saturday daytime, which have remained constant. She previously used hydroxyzine for sleep, which caused nightmares and frequent waking, but not tremors. She expresses hesitancy to start another medication due to recent side effects.    TREMORS:  She reports experiencing tremors throughout the day that stop when lying down but persist while sitting or standing. The tremors began on Saturday during daytime hours. She expresses distress about the tremors, stating they impact her desire to visit her daughter's house.    HEADACHE:  She reports tension-like headaches in the sinus area, sides of her head, and back. The headaches persist even when her tremors subside upon lying down.    CARDIOVASCULAR SYMPTOMS:  She reports chest pressure across her chest, which started this morning and comes and goes. For the past three days, she has experienced a significant increase in pulse rate when standing up or walking. She reports blood pressure fluctuations over the past two days, documenting a change from 142/91 to 97/77 within a 30-minute period, with pulse rate decreasing from 122 to 101. The following morning, her blood pressure was 136/92 with a pulse of 97. " Vitals wnl today in office     PACEMAKER:  She reports soreness around the pacemaker site, placed on August 26th, describing a feeling of pulling when getting up after side sleeping. She denies any zapping sensations. The discomfort has been present for the past three days and does not worsen with standing or walking.    URINARY SYMPTOMS:  She reports fluctuating urinary pain over the past week, denying burning sensation during urination or hematuria.    MEDICATIONS:  She recently started Paxil and took Levaquin antibiotic for one day.    WORK STATUS:  She reports being unable to work for almost two years.          Past medical histories reviewed, including past medical, surgical, family and social histories.      Current Outpatient Medications on File Prior to Visit   Medication Sig Dispense Refill    atorvastatin (LIPITOR) 40 MG tablet TAKE 1 TABLET BY MOUTH EVERY DAY 90 tablet 3    multivitamin with minerals tablet Take 1 tablet by mouth once daily.      vitamin D (VITAMIN D3) 1000 units Tab Take 5,000 Units by mouth once daily.       No current facility-administered medications on file prior to visit.       Review of Systems   12 point review of systems negative except for listed in HPI.     Objective:    Nursing note and vitals reviewed.  Vitals:    11/18/24 0905   BP: 135/81   Pulse: 92   Resp: 18   Temp: 97.9 °F (36.6 °C)     Body mass index is 30.89 kg/m².     Physical Exam   Constitutional: oriented to person, place, and time. well-developed and well-nourished. No distress.   HENT: WNL  Head: Normocephalic and atraumatic.   Eyes: EOM are normal.   Neck: Normal range of motion. Neck supple.   Cardiovascular: Normal rate  Pulmonary/Chest: Effort normal. No respiratory distress.   GI: soft, non distended, no ttp, no rebound/guarding  Musculoskeletal: Normal range of motion. no edema.   Neurological: CN II-XII intact. Fine active tremor to alok hands. No neuro deficits   Skin: warm and dry. L ant chest wall with  pacemaker in place. no edema, erythema, or ecchymosis mild ttp  Psychiatric: normal mood and affect. behavior is normal.   Physical Exam    Vitals: Blood pressure: 135/81. Pulse: 92.               We Offer Telehealth & Same Day Appointments!   Book your Telehealth appointment with me through my nurse or   Clinic appointments on [facility]!  [phone]     To Schedule appointments online, go to [facility]: [email or web address]       Visit today included increased complexity associated with the care of the episodic problem addressed and managing the longitudinal care of the patient due to the serious and/or complex managed problem(s) as per problem list.     This note was generated with the assistance of ambient listening technology. Verbal consent was obtained by the patient and accompanying visitor(s) for the recording of patient appointment to facilitate this note. I attest to having reviewed and edited the generated note for accuracy, though some syntax or spelling errors may persist. Please contact the author of this note for any clarification.

## 2024-12-09 ENCOUNTER — PATIENT MESSAGE (OUTPATIENT)
Dept: CARDIOLOGY | Facility: CLINIC | Age: 59
End: 2024-12-09
Payer: COMMERCIAL

## 2024-12-10 NOTE — PROGRESS NOTES
Subjective   Patient ID:  Vesna Richards is a 59 y.o. female who presents for follow-up of Pacemaker Check      59 yoF here for device management. She present with paroxysmal AV block leading to DC PM with LBAP lead 8/26/24. Normal DC PM function with no sustained arrhythmias. Rare AP/.     Echo 8/24:  ·  Small hyperdynamc LV cavity. Consider fluid resuscitation if clinically indicated.  ·  Overall the study quality was technically difficult. The study was difficult due to patient's clinical status, body habitus and poor endocardial visualization. linited valve visualization.  ·  Left Ventricle: The left ventricle is normal in size. Normal wall thickness. There is concentric remodeling. There is hyperdynamic systolic function with a visually estimated ejection fraction of greater than 70%. There is normal diastolic function. if lead present in RV not well visualized.  ·  Left Ventricle: There is normal diastolic function. Normal left ventricular filling pressure.  ·  Right Ventricle: Right ventricle was not well visualized due to poor acoustic window. Normal right ventricular cavity size. Wall thickness is normal. Right ventricle wall motion  is normal. Systolic function is normal.  ·  Left Atrium: Normal left atrial size.  ·  Right Atrium: Normal right atrial size.  ·  Aorta: Aortic root is normal in size measuring 2.84 cm. Ascending aorta is normal measuring 2.06 cm.  ·  IVC/SVC: Normal venous pressure at 3 mmHg.  ·  Prominent pericardial fat pad.      My interpretation of the ECG is:    Past Medical History:  01/10/2024: Intractable epilepsy without status epilepticus  No date: Seizures    Past Surgical History:  8/26/2024: A-V CARDIAC PACEMAKER INSERTION; N/A      Comment:  Procedure: INSERTION, CARDIAC PACEMAKER, DUAL CHAMBER;                 Surgeon: Jovanny King MD;  Location: [facility] EP LAB;               Service: Cardiology;  Laterality: N/A;  SILVINA, Bozena w/ LB,                MDT, ANES, MB, CVICU  [id number]  No date: BREAST BIOPSY  No date:  SECTION    Social History    Socioeconomic History      Marital status:     Tobacco Use      Smoking status: Every Day        Packs/day: 0.00        Years: 2.2 packs/day for 82.6 years (179.5 ttl pk-yrs)        Types: Cigarettes        Start date:         Last attempt to quit: 2024        Years since quittin.3      Smokeless tobacco: Never    Substance and Sexual Activity      Alcohol use: Not Currently      Drug use: Never      Sexual activity: Yes        Partners: Male    Social Drivers of Health  Financial Resource Strain: Low Risk  (2024)      Overall Financial Resource Strain (CARDIA)          Difficulty of Paying Living Expenses: Not very hard  Recent Concern: Financial Resource Strain - Medium Risk (2024)      Overall Financial Resource Strain (CARDIA)          Difficulty of Paying Living Expenses: Somewhat hard  Food Insecurity: Food Insecurity Present (2024)      Hunger Vital Sign          Worried About Running Out of Food in the Last Year: Sometimes true          Ran Out of Food in the Last Year: Sometimes true  Transportation Needs: No Transportation Needs (2024)      TRANSPORTATION NEEDS          Transportation : No  Physical Activity: Insufficiently Active (2024)      Exercise Vital Sign          Days of Exercise per Week: 2 days          Minutes of Exercise per Session: 20 min  Stress: Stress Concern Present (2024)      [employer] [place] [employer] - Occupational Stress Questionnaire          Feeling of Stress : To some extent  Housing Stability: Low Risk  (2024)      Housing Stability Vital Sign          Unable to Pay for Housing in the Last Year: No          Homeless in the Last Year: No    Review of patient's family history indicates:  Problem: Ovarian cancer      Relation: Sister          Name:               Age of Onset: (Not Specified)  Problem: Ovarian cancer      Relation:  Sister          Name:               Age of Onset: (Not Specified)      Current Outpatient Medications:  atorvastatin (LIPITOR) 40 MG tablet, TAKE 1 TABLET BY MOUTH EVERY DAY, Disp: 90 tablet, Rfl: 3  hydrOXYzine pamoate (VISTARIL) 50 MG Cap, Take 1 capsule (50 mg total) by mouth every 8 (eight) hours as needed., Disp: 90 capsule, Rfl: 3  methocarbamoL (ROBAXIN) 750 MG Tab, Take 1 tablet (750 mg total) by mouth 4 (four) times daily., Disp: 40 tablet, Rfl: 3  multivitamin with minerals tablet, Take 1 tablet by mouth once daily., Disp: , Rfl:   vitamin D (VITAMIN D3) 1000 units Tab, Take 5,000 Units by mouth once daily., Disp: , Rfl:     No current facility-administered medications for this visit.            Review of Systems   Constitutional: Negative.   HENT: Negative.     Eyes: Negative.    Cardiovascular:  Negative for chest pain, dyspnea on exertion, leg swelling, near-syncope, palpitations and syncope.   Respiratory: Negative.  Negative for shortness of breath.    Endocrine: Negative.    Hematologic/Lymphatic: Negative.    Skin: Negative.    Musculoskeletal: Negative.    Gastrointestinal: Negative.    Genitourinary: Negative.    Neurological: Negative.  Negative for dizziness and light-headedness.   Psychiatric/Behavioral: Negative.     Allergic/Immunologic: Negative.           Objective     Physical Exam  Vitals reviewed.   Constitutional:       General: She is not in acute distress.     Appearance: She is well-developed.   HENT:      Head: Normocephalic and atraumatic.   Eyes:      Pupils: Pupils are equal, round, and reactive to light.   Neck:      Thyroid: No thyromegaly.      Vascular: No JVD.   Cardiovascular:      Rate and Rhythm: Normal rate and regular rhythm.      Chest Wall: PMI is not displaced.      Heart sounds: Normal heart sounds, S1 normal and S2 normal. No murmur heard.     No friction rub. No gallop.      Comments: Well healed scar overlying pacemaker generator pocket  Pulmonary:      Effort:  Pulmonary effort is normal. No respiratory distress.      Breath sounds: Normal breath sounds. No wheezing or rales.   Abdominal:      General: Bowel sounds are normal. There is no distension.      Palpations: Abdomen is soft.      Tenderness: There is no abdominal tenderness. There is no guarding or rebound.   Musculoskeletal:         General: No tenderness. Normal range of motion.      Cervical back: Normal range of motion.   Skin:     General: Skin is warm and dry.      Findings: No erythema or rash.   Neurological:      Mental Status: She is alert and oriented to person, place, and time.      Cranial Nerves: No cranial nerve deficit.   Psychiatric:         Behavior: Behavior normal.         Thought Content: Thought content normal.         Judgment: Judgment normal.            Assessment and Plan     1. CHB (complete heart block)    2. Cardiac pacemaker in situ    3. Palpitations        Plan:  59 yoF here for PM follow up. Normal DC PM function with no sustained arrhythmias. I discussed routine device follow up including quarterly to bi-annual device checks for device function as well as yearly follow up in the EP clinic. The patient  was advised to call with any concerns regarding their device. Device clinic follow up as scheduled. RTC 1y

## 2024-12-11 ENCOUNTER — HOSPITAL ENCOUNTER (OUTPATIENT)
Dept: CARDIOLOGY | Facility: HOSPITAL | Age: 59
Discharge: HOME | End: 2024-12-11
Attending: INTERNAL MEDICINE
Payer: COMMERCIAL

## 2024-12-11 ENCOUNTER — OFFICE VISIT (OUTPATIENT)
Dept: CARDIOLOGY | Facility: CLINIC | Age: 59
End: 2024-12-11
Attending: INTERNAL MEDICINE
Payer: COMMERCIAL

## 2024-12-11 VITALS
OXYGEN SATURATION: 97 % | BODY MASS INDEX: 31.23 KG/M2 | HEIGHT: 63 IN | RESPIRATION RATE: 16 BRPM | HEART RATE: 105 BPM | SYSTOLIC BLOOD PRESSURE: 135 MMHG | WEIGHT: 176.25 LBS | DIASTOLIC BLOOD PRESSURE: 77 MMHG

## 2024-12-11 DIAGNOSIS — Z95.0 CARDIAC PACEMAKER IN SITU: ICD-10-CM

## 2024-12-11 DIAGNOSIS — R00.2 PALPITATIONS: ICD-10-CM

## 2024-12-11 DIAGNOSIS — I44.2 CHB (COMPLETE HEART BLOCK): ICD-10-CM

## 2024-12-11 DIAGNOSIS — I44.2 COMPLETE AV BLOCK: Primary | ICD-10-CM

## 2024-12-11 PROCEDURE — 93280 PM DEVICE PROGR EVAL DUAL: CPT

## 2024-12-11 PROCEDURE — 99999 PR PBB SHADOW E&M-EST. PATIENT-LVL IV: CPT | Mod: PBBFAC,,, | Performed by: INTERNAL MEDICINE

## 2024-12-11 PROCEDURE — 99214 OFFICE O/P EST MOD 30 MIN: CPT | Mod: S$GLB,,, | Performed by: INTERNAL MEDICINE

## 2024-12-11 PROCEDURE — 93280 PM DEVICE PROGR EVAL DUAL: CPT | Mod: 26,,, | Performed by: INTERNAL MEDICINE

## 2024-12-12 LAB
OHS CV AF BURDEN PERCENT: < 1
OHS CV DC REMOTE DEVICE TYPE: NORMAL
OHS CV RV PACING PERCENT: 0.1 %

## 2024-12-19 ENCOUNTER — TELEPHONE (OUTPATIENT)
Dept: SMOKING CESSATION | Facility: CLINIC | Age: 59
End: 2024-12-19
Payer: COMMERCIAL

## 2024-12-19 NOTE — TELEPHONE ENCOUNTER
Attempted to call patient for their smoking cessation appointment. Left a message with my contact information to reschedule the appointment. Audrey Mejia [phone].

## 2025-01-06 ENCOUNTER — CLINICAL SUPPORT (OUTPATIENT)
Dept: CARDIOLOGY | Facility: HOSPITAL | Age: 60
End: 2025-01-06

## 2025-01-06 DIAGNOSIS — I44.2 ATRIOVENTRICULAR BLOCK, COMPLETE: ICD-10-CM

## 2025-01-06 DIAGNOSIS — Z95.0 PRESENCE OF CARDIAC PACEMAKER: ICD-10-CM

## 2025-01-07 ENCOUNTER — CLINICAL SUPPORT (OUTPATIENT)
Dept: CARDIOLOGY | Facility: HOSPITAL | Age: 60
End: 2025-01-07
Attending: INTERNAL MEDICINE
Payer: COMMERCIAL

## 2025-01-07 DIAGNOSIS — Z95.0 PRESENCE OF CARDIAC PACEMAKER: ICD-10-CM

## 2025-01-07 DIAGNOSIS — I44.2 ATRIOVENTRICULAR BLOCK, COMPLETE: ICD-10-CM

## 2025-01-07 PROCEDURE — 93296 REM INTERROG EVL PM/IDS: CPT | Performed by: INTERNAL MEDICINE

## 2025-01-09 ENCOUNTER — E-VISIT (OUTPATIENT)
Dept: URGENT CARE | Facility: CLINIC | Age: 60
End: 2025-01-09
Payer: COMMERCIAL

## 2025-01-09 DIAGNOSIS — J01.00 SUBACUTE MAXILLARY SINUSITIS: Primary | ICD-10-CM

## 2025-01-09 DIAGNOSIS — R51.9 SINUS HEADACHE: ICD-10-CM

## 2025-01-09 RX ORDER — OXYMETAZOLINE HCL 0.05 %
2 SPRAY, NON-AEROSOL (ML) NASAL 2 TIMES DAILY
Qty: 6 ML | Refills: 0 | Status: SHIPPED | OUTPATIENT
Start: 2025-01-09 | End: 2025-01-12

## 2025-01-09 RX ORDER — DOXYCYCLINE HYCLATE 100 MG
100 TABLET ORAL 2 TIMES DAILY
Qty: 14 TABLET | Refills: 0 | Status: SHIPPED | OUTPATIENT
Start: 2025-01-09 | End: 2025-01-16

## 2025-01-09 RX ORDER — DESLORATADINE 5 MG/1
5 TABLET ORAL DAILY
Qty: 7 TABLET | Refills: 0 | Status: SHIPPED | OUTPATIENT
Start: 2025-01-09 | End: 2025-01-16
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
## 2025-01-16 DIAGNOSIS — F41.9 ANXIETY: ICD-10-CM

## 2025-01-17 RX ORDER — HYDROXYZINE PAMOATE 100 MG/1
100 CAPSULE ORAL 2 TIMES DAILY
Qty: 180 CAPSULE | Refills: 3 | Status: SHIPPED | OUTPATIENT
Start: 2025-01-17

## 2025-02-10 ENCOUNTER — OFFICE VISIT (OUTPATIENT)
Dept: PSYCHIATRY | Facility: CLINIC | Age: 60
End: 2025-02-10
Payer: COMMERCIAL

## 2025-02-10 DIAGNOSIS — F40.01 PANIC DISORDER WITH AGORAPHOBIA: ICD-10-CM

## 2025-02-10 DIAGNOSIS — F32.9 MAJOR DEPRESSIVE EPISODE: Primary | ICD-10-CM

## 2025-02-10 DIAGNOSIS — F41.1 GENERALIZED ANXIETY DISORDER: ICD-10-CM

## 2025-02-10 PROCEDURE — 90834 PSYTX W PT 45 MINUTES: CPT | Mod: 95,,, | Performed by: SOCIAL WORKER
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
## 2025-02-12 ENCOUNTER — OFFICE VISIT (OUTPATIENT)
Dept: FAMILY MEDICINE | Facility: CLINIC | Age: 60
End: 2025-02-12
Payer: COMMERCIAL

## 2025-02-12 VITALS
WEIGHT: 176.81 LBS | DIASTOLIC BLOOD PRESSURE: 64 MMHG | HEART RATE: 77 BPM | RESPIRATION RATE: 18 BRPM | OXYGEN SATURATION: 98 % | BODY MASS INDEX: 31.33 KG/M2 | SYSTOLIC BLOOD PRESSURE: 121 MMHG | TEMPERATURE: 98 F | HEIGHT: 63 IN

## 2025-02-12 DIAGNOSIS — F33.1 MODERATE EPISODE OF RECURRENT MAJOR DEPRESSIVE DISORDER: ICD-10-CM

## 2025-02-12 DIAGNOSIS — I44.2 COMPLETE AV BLOCK: ICD-10-CM

## 2025-02-12 DIAGNOSIS — F17.200 TOBACCO DEPENDENCE SYNDROME: ICD-10-CM

## 2025-02-12 DIAGNOSIS — F40.00 AGORAPHOBIA: ICD-10-CM

## 2025-02-12 DIAGNOSIS — D75.1 POLYCYTHEMIA: ICD-10-CM

## 2025-02-12 DIAGNOSIS — F41.9 ANXIETY: ICD-10-CM

## 2025-02-12 DIAGNOSIS — R23.3 EASY BRUISING: ICD-10-CM

## 2025-02-12 DIAGNOSIS — Z12.31 ENCOUNTER FOR SCREENING MAMMOGRAM FOR MALIGNANT NEOPLASM OF BREAST: Primary | ICD-10-CM

## 2025-02-12 PROBLEM — R56.9 SEIZURE-LIKE ACTIVITY: Status: RESOLVED | Noted: 2024-08-26 | Resolved: 2025-02-12

## 2025-02-12 PROBLEM — L08.9 INFECTED ABRASION OF RIGHT HAND: Status: RESOLVED | Noted: 2024-03-25 | Resolved: 2025-02-12

## 2025-02-12 PROBLEM — S60.511A INFECTED ABRASION OF RIGHT HAND: Status: RESOLVED | Noted: 2024-03-25 | Resolved: 2025-02-12

## 2025-02-12 PROBLEM — R56.9 SEIZURE: Status: RESOLVED | Noted: 2023-06-02 | Resolved: 2025-02-12

## 2025-02-12 PROBLEM — I46.9 ASYSTOLE: Status: RESOLVED | Noted: 2024-08-24 | Resolved: 2025-02-12

## 2025-02-12 PROCEDURE — 99999 PR PBB SHADOW E&M-EST. PATIENT-LVL IV: CPT | Mod: PBBFAC,,, | Performed by: STUDENT IN AN ORGANIZED HEALTH CARE EDUCATION/TRAINING PROGRAM

## 2025-02-12 RX ORDER — METOPROLOL SUCCINATE 25 MG/1
1 TABLET, EXTENDED RELEASE ORAL DAILY
COMMUNITY

## 2025-02-12 NOTE — PROGRESS NOTES
1. Encounter for screening mammogram for malignant neoplasm of breast  -     Mammo Digital Screening Bilat w/ Andres; Future; Expected date: 05/07/2025    2. Tobacco dependence syndrome  Overview:  Assistance with smoking cessation was offered, including:  [x]  Medications  [x]  Counseling  []  Printed Information on Smoking Cessation  [x]  Referral to a Smoking Cessation Program    Patient was counseled regarding smoking for 3-10 minutes.     Orders:  -     RPR; Future; Expected date: 02/12/2025  -     HIV 1/2 Ag/Ab (4th Gen); Future; Expected date: 02/12/2025  -     Phosphorus; Future; Expected date: 02/12/2025  -     Magnesium; Future; Expected date: 02/12/2025  -     APTT; Future; Expected date: 02/12/2025  -     Protime-INR; Future; Expected date: 02/12/2025  -     CK; Future; Expected date: 02/12/2025  -     T4, Free; Future; Expected date: 02/12/2025  -     MPN (JAK2 V617F)WITH REFLEX TO CALR,MPL; Future; Expected date: 02/12/2025  -     ERYTHROPOIETIN; Future; Expected date: 02/12/2025  -     Peripheral Smear Review for Hemolysis or Low Platelets; Future; Expected date: 02/12/2025    3. Easy bruising  -     RPR; Future; Expected date: 02/12/2025  -     HIV 1/2 Ag/Ab (4th Gen); Future; Expected date: 02/12/2025  -     Phosphorus; Future; Expected date: 02/12/2025  -     Magnesium; Future; Expected date: 02/12/2025  -     APTT; Future; Expected date: 02/12/2025  -     Protime-INR; Future; Expected date: 02/12/2025  -     CK; Future; Expected date: 02/12/2025  -     T4, Free; Future; Expected date: 02/12/2025    4. Polycythemia  -     RPR; Future; Expected date: 02/12/2025  -     HIV 1/2 Ag/Ab (4th Gen); Future; Expected date: 02/12/2025  -     Phosphorus; Future; Expected date: 02/12/2025  -     Magnesium; Future; Expected date: 02/12/2025  -     APTT; Future; Expected date: 02/12/2025  -     Protime-INR; Future; Expected date: 02/12/2025  -     CK; Future; Expected date: 02/12/2025  -     T4, Free; Future;  Expected date: 02/12/2025  -     MPN (JAK2 V617F)WITH REFLEX TO CALR,MPL; Future; Expected date: 02/12/2025  -     ERYTHROPOIETIN; Future; Expected date: 02/12/2025  -     Peripheral Smear Review for Hemolysis or Low Platelets; Future; Expected date: 02/12/2025    5. Anxiety  Overview:  Chronic history; hydroxyzine BID  Denies SI/HI; no hallucinations   Follows with psych appt    Assoc panic attacks          6. High grade AV block  Overview:  S/p pacemaker 2024  Cont bb      7. Agoraphobia  Overview:  Chronic hx; avoids large crowds and large buildings      8. Moderate episode of recurrent major depressive disorder  Overview:  Chronic hx; active, anxiety worse  Follows with psych           Assessment & Plan      ANXIETY:  - Evaluated the management of persistent anxiety symptoms affecting sleep and daily functioning.  - Reviewed current medications and their effectiveness, including hydroxyzine for anxiety.  - Continued hydroxyzine 100mg in the morning and 100mg at night for anxiety management.  - Noted the patient is receiving therapy and had a virtual visit with a .    AGORAPHOBIA:  - Acknowledged the patient's agoraphobia and its impact on normal activities.  - Noted ongoing treatment for agoraphobia.  - Confirmed the patient has not been released to work due to agoraphobia.    DEPRESSION:  - Evaluated management of persistent depression symptoms.  - Noted recent worsening of depression and suboptimal mood.    LEFT ROTATOR CUFF TEAR:  - Assessed left rotator cuff tear, agreeing with current conservative management.  - Noted pain when lifting arm, especially overhead.  - Discussed treatment options for the rotator cuff tear.  - Acknowledged previous physical therapy was ineffective due to aggravation.  - Discussed the possibility of cortisone injections for 3 months.  - cont routine ortho f/u    SMOKING CESSATION:  - Acknowledged the patient's progress in smoking reduction from 2 packs to 1 pack  daily.  - Encouraged continued efforts to reduce smoking, aiming for complete cessation.  - Noted the patient is using peppermint lifesavers to manage cigarette cravings.  - Recommend considering a switch to sugar-free peppermints to aid in smoking reduction.  - Scheduled smoking cessation appointment for the 26th.    EASY BRUISING:  - Evaluated reports of easy bruising, ruling out anticoagulant use.    LABS:  - Ordered fasting labs to be completed in the morning.  - Scheduled follow-up visit for fasting labs.    PAP SMEAR:  - Discussed importance of regular pap smears for preventive care.  - Follow up for routine pap smear      Emily Dumont MD  _________________________________________________________________________      Patient ID: Vesna Richards is a 59 y.o. female.    History of Present Illness    CHIEF COMPLAINT:  Patient presents today for follow up of multiple medical conditions including anxiety and cardiac issues.    CARDIAC HISTORY:  She has a history of cardiac issues including extreme rapid heartbeat and heart stopping episodes that were previously misdiagnosed as epilepsy. One episode resulted in loss of bowel and bladder control. A pacemaker was placed in August 2024, during which she coded and required resuscitation. She currently takes metoprolol 25mg with partial improvement. She experiences occasional dizziness, which she attributes to possibly orthostatic changes or sleep deprivation. She denies chest pain.    PSYCHIATRIC:  She reports poorly controlled anxiety stating she can function but her mind does not stop. Her depression has been worsening recently. She has agoraphobia and panic attacks which prevent her from working. She continues hydroxyzine 100mg twice daily. She reports previous adverse reactions to psychiatric medications, including nightmares and hallucinations with two different medications. She receives virtual therapy and had a telehealth visit with her  yesterday.  She describes herself as functional but not okay.    MUSCULOSKELETAL:  She has a left rotator cuff tear with pain on overhead movement. Physical therapy was attempted but discontinued due to symptom aggravation.    SOCIAL HISTORY:  She has reduced smoking from two packs to one pack per day, using peppermint lifesavers as a cessation aid. She has scheduled a smoking cessation appointment for the 26th.    INTEGUMENTARY:  She reports easy bruising since pacemaker placement. She denies being on blood thinners.          Past medical histories reviewed, including past medical, surgical, family and social histories.      Current Outpatient Medications on File Prior to Visit   Medication Sig Dispense Refill    atorvastatin (LIPITOR) 40 MG tablet TAKE 1 TABLET BY MOUTH EVERY DAY 90 tablet 3    hydrOXYzine pamoate (VISTARIL) 100 MG capsule Take 1 capsule (100 mg total) by mouth 2 (two) times a day. 180 capsule 3    metoprolol succinate (TOPROL-XL) 25 MG 24 hr tablet Take 1 tablet by mouth once daily.      multivitamin with minerals tablet Take 1 tablet by mouth once daily.      vitamin D (VITAMIN D3) 1000 units Tab Take 5,000 Units by mouth once daily.      [DISCONTINUED] desloratadine (CLARINEX) 5 mg tablet Take 1 tablet (5 mg total) by mouth once daily. for 7 days 7 tablet 0    [DISCONTINUED] methocarbamoL (ROBAXIN) 750 MG Tab Take 1 tablet (750 mg total) by mouth 4 (four) times daily. 40 tablet 3     No current facility-administered medications on file prior to visit.       Review of Systems   12 point review of systems negative except for listed in HPI.     Objective:    Nursing note and vitals reviewed.  Vitals:    02/12/25 1026   BP: 121/64   Pulse: 77   Resp: 18   Temp: 98.1 °F (36.7 °C)     Body mass index is 31.32 kg/m².     Physical Exam   Constitutional: oriented to person, place, and time. well-developed and well-nourished. No distress.   HENT: WNL  Head: Normocephalic and atraumatic.   Eyes: EOM are normal.    Neck: Normal range of motion. Neck supple.   Cardiovascular: Normal rate  Pulmonary/Chest: Effort normal. No respiratory distress.   GI: soft, non distended, no ttp, no rebound/guarding  Musculoskeletal: Normal range of motion. no edema.   Neurological: CN II-XII intact  Skin: warm and dry. Ecchymosis throughout bue  Psychiatric: normal mood and affect. behavior is normal.             We Offer Telehealth & Same Day Appointments!   Book your Telehealth appointment with me through my nurse or   Clinic appointments on [employer]!  [phone]     To Schedule appointments online, go to [employer]: [email or web address]       Visit today included increased complexity associated with the care of the episodic problem addressed and managing the longitudinal care of the patient due to the serious and/or complex managed problem(s) as per problem list.     This note was generated with the assistance of ambient listening technology. Verbal consent was obtained by the patient and accompanying visitor(s) for the recording of patient appointment to facilitate this note. I attest to having reviewed and edited the generated note for accuracy, though some syntax or spelling errors may persist. Please contact the author of this note for any clarification.

## 2025-02-13 ENCOUNTER — LAB VISIT (OUTPATIENT)
Dept: LAB | Facility: HOSPITAL | Age: 60
End: 2025-02-13
Payer: COMMERCIAL

## 2025-02-13 DIAGNOSIS — R07.9 CHEST PAIN, UNSPECIFIED TYPE: ICD-10-CM

## 2025-02-13 DIAGNOSIS — D75.1 POLYCYTHEMIA: ICD-10-CM

## 2025-02-13 DIAGNOSIS — F17.200 TOBACCO DEPENDENCE SYNDROME: ICD-10-CM

## 2025-02-13 DIAGNOSIS — R30.0 DYSURIA: ICD-10-CM

## 2025-02-13 DIAGNOSIS — R23.3 EASY BRUISING: ICD-10-CM

## 2025-02-13 DIAGNOSIS — E78.2 MIXED HYPERLIPIDEMIA: ICD-10-CM

## 2025-02-13 DIAGNOSIS — F41.9 ANXIETY: ICD-10-CM

## 2025-02-13 LAB
ALBUMIN SERPL BCP-MCNC: 3.3 G/DL (ref 3.5–5.2)
ALP SERPL-CCNC: 105 U/L (ref 40–150)
ALT SERPL W/O P-5'-P-CCNC: 40 U/L (ref 10–44)
ANION GAP SERPL CALC-SCNC: 9 MMOL/L (ref 8–16)
ANION GAP SERPL CALC-SCNC: 9 MMOL/L (ref 8–16)
APTT PPP: 29.3 SEC (ref 21–32)
AST SERPL-CCNC: 31 U/L (ref 10–40)
BASOPHILS # BLD AUTO: 0.07 K/UL (ref 0–0.2)
BASOPHILS NFR BLD: 0.8 % (ref 0–1.9)
BILIRUB SERPL-MCNC: 0.6 MG/DL (ref 0.1–1)
BUN SERPL-MCNC: 10 MG/DL (ref 6–20)
BUN SERPL-MCNC: 10 MG/DL (ref 6–20)
CALCIUM SERPL-MCNC: 9.1 MG/DL (ref 8.7–10.5)
CALCIUM SERPL-MCNC: 9.1 MG/DL (ref 8.7–10.5)
CHLORIDE SERPL-SCNC: 107 MMOL/L (ref 95–110)
CHLORIDE SERPL-SCNC: 107 MMOL/L (ref 95–110)
CK SERPL-CCNC: 55 U/L (ref 20–180)
CO2 SERPL-SCNC: 27 MMOL/L (ref 23–29)
CO2 SERPL-SCNC: 27 MMOL/L (ref 23–29)
CREAT SERPL-MCNC: 0.9 MG/DL (ref 0.5–1.4)
CREAT SERPL-MCNC: 0.9 MG/DL (ref 0.5–1.4)
DIFFERENTIAL METHOD BLD: ABNORMAL
EOSINOPHIL # BLD AUTO: 0.1 K/UL (ref 0–0.5)
EOSINOPHIL NFR BLD: 0.5 % (ref 0–8)
ERYTHROCYTE [DISTWIDTH] IN BLOOD BY AUTOMATED COUNT: 14.1 % (ref 11.5–14.5)
EST. GFR  (NO RACE VARIABLE): >60 ML/MIN/1.73 M^2
EST. GFR  (NO RACE VARIABLE): >60 ML/MIN/1.73 M^2
GLUCOSE SERPL-MCNC: 96 MG/DL (ref 70–110)
GLUCOSE SERPL-MCNC: 96 MG/DL (ref 70–110)
HCT VFR BLD AUTO: 46.7 % (ref 37–48.5)
HGB BLD-MCNC: 14.9 G/DL (ref 12–16)
HIV 1+2 AB+HIV1 P24 AG SERPL QL IA: NORMAL
IMM GRANULOCYTES # BLD AUTO: 0.02 K/UL (ref 0–0.04)
IMM GRANULOCYTES NFR BLD AUTO: 0.2 % (ref 0–0.5)
INR PPP: 1 (ref 0.8–1.2)
LYMPHOCYTES # BLD AUTO: 3 K/UL (ref 1–4.8)
LYMPHOCYTES NFR BLD: 32.9 % (ref 18–48)
MAGNESIUM SERPL-MCNC: 1.9 MG/DL (ref 1.6–2.6)
MCH RBC QN AUTO: 29.3 PG (ref 27–31)
MCHC RBC AUTO-ENTMCNC: 31.9 G/DL (ref 32–36)
MCV RBC AUTO: 92 FL (ref 82–98)
MONOCYTES # BLD AUTO: 0.6 K/UL (ref 0.3–1)
MONOCYTES NFR BLD: 6.4 % (ref 4–15)
MPNR  SPECIMEN TYPE: NORMAL
NEUTROPHILS # BLD AUTO: 5.4 K/UL (ref 1.8–7.7)
NEUTROPHILS NFR BLD: 59.2 % (ref 38–73)
NRBC BLD-RTO: 0 /100 WBC
PATH REV BLD -IMP: NORMAL
PHOSPHATE SERPL-MCNC: 3.5 MG/DL (ref 2.7–4.5)
PLATELET # BLD AUTO: 244 K/UL (ref 150–450)
PMV BLD AUTO: 10.8 FL (ref 9.2–12.9)
POTASSIUM SERPL-SCNC: 3.9 MMOL/L (ref 3.5–5.1)
POTASSIUM SERPL-SCNC: 3.9 MMOL/L (ref 3.5–5.1)
PROT SERPL-MCNC: 6.5 G/DL (ref 6–8.4)
PROTHROMBIN TIME: 11 SEC (ref 9–12.5)
RBC # BLD AUTO: 5.08 M/UL (ref 4–5.4)
SODIUM SERPL-SCNC: 143 MMOL/L (ref 136–145)
SODIUM SERPL-SCNC: 143 MMOL/L (ref 136–145)
T4 FREE SERPL-MCNC: 1.09 NG/DL (ref 0.71–1.51)
WBC # BLD AUTO: 9.1 K/UL (ref 3.9–12.7)

## 2025-02-13 PROCEDURE — 85610 PROTHROMBIN TIME: CPT | Performed by: STUDENT IN AN ORGANIZED HEALTH CARE EDUCATION/TRAINING PROGRAM

## 2025-02-13 PROCEDURE — 87389 HIV-1 AG W/HIV-1&-2 AB AG IA: CPT | Performed by: STUDENT IN AN ORGANIZED HEALTH CARE EDUCATION/TRAINING PROGRAM

## 2025-02-13 PROCEDURE — 80053 COMPREHEN METABOLIC PANEL: CPT | Performed by: STUDENT IN AN ORGANIZED HEALTH CARE EDUCATION/TRAINING PROGRAM

## 2025-02-13 PROCEDURE — 36415 COLL VENOUS BLD VENIPUNCTURE: CPT | Mod: PO | Performed by: STUDENT IN AN ORGANIZED HEALTH CARE EDUCATION/TRAINING PROGRAM

## 2025-02-13 PROCEDURE — 82550 ASSAY OF CK (CPK): CPT | Performed by: STUDENT IN AN ORGANIZED HEALTH CARE EDUCATION/TRAINING PROGRAM

## 2025-02-13 PROCEDURE — 83735 ASSAY OF MAGNESIUM: CPT | Performed by: STUDENT IN AN ORGANIZED HEALTH CARE EDUCATION/TRAINING PROGRAM

## 2025-02-13 PROCEDURE — 84100 ASSAY OF PHOSPHORUS: CPT | Performed by: STUDENT IN AN ORGANIZED HEALTH CARE EDUCATION/TRAINING PROGRAM

## 2025-02-13 PROCEDURE — 85025 COMPLETE CBC W/AUTO DIFF WBC: CPT | Performed by: STUDENT IN AN ORGANIZED HEALTH CARE EDUCATION/TRAINING PROGRAM

## 2025-02-13 PROCEDURE — 82668 ASSAY OF ERYTHROPOIETIN: CPT | Performed by: STUDENT IN AN ORGANIZED HEALTH CARE EDUCATION/TRAINING PROGRAM

## 2025-02-13 PROCEDURE — 85730 THROMBOPLASTIN TIME PARTIAL: CPT | Performed by: STUDENT IN AN ORGANIZED HEALTH CARE EDUCATION/TRAINING PROGRAM

## 2025-02-13 PROCEDURE — 81270 JAK2 GENE: CPT | Performed by: STUDENT IN AN ORGANIZED HEALTH CARE EDUCATION/TRAINING PROGRAM

## 2025-02-13 PROCEDURE — 84439 ASSAY OF FREE THYROXINE: CPT | Performed by: STUDENT IN AN ORGANIZED HEALTH CARE EDUCATION/TRAINING PROGRAM

## 2025-02-14 LAB — TREPONEMA PALLIDUM IGG+IGM AB [PRESENCE] IN SERUM OR PLASMA BY IMMUNOASSAY: NONREACTIVE

## 2025-02-17 ENCOUNTER — PATIENT MESSAGE (OUTPATIENT)
Dept: FAMILY MEDICINE | Facility: CLINIC | Age: 60
End: 2025-02-17
Payer: COMMERCIAL

## 2025-02-17 LAB
EPO SERPL-ACNC: 17 MIU/ML (ref 2.6–18.5)
OHS CV AF BURDEN PERCENT: < 1
OHS CV DC REMOTE DEVICE TYPE: NORMAL
OHS CV RV PACING PERCENT: 0.03 %

## 2025-02-19 ENCOUNTER — RESULTS FOLLOW-UP (OUTPATIENT)
Dept: FAMILY MEDICINE | Facility: CLINIC | Age: 60
End: 2025-02-19
Payer: COMMERCIAL

## 2025-02-19 NOTE — PROGRESS NOTES
Labs ok. Slightly reduced albumin (protein level). No concern. No evidence by labs for bruising.     Please do not hesitate to call or message with any questions or concerns    Emily Dumont MD

## 2025-02-26 NOTE — TELEPHONE ENCOUNTER
Patient states she had a 'histamine' reaction after an ankle surgery in 2016. Per patient, no post-op intervention needed. Patient's chart presented to anesthesia (Dr. Card) and she asked for anesthesia record. Patient states surgery done at [facility], but upon speaking with medical records, they do not have a record of her surgery at [facility], that it was possibly done at [facility] looking at past encounters in [facility]. Fax sent to [facility] asking for OR/anesthesia records from 2016 ankle surgery. HIM at [place] could not confirm with this RN if surgery was done at [place] without a fax request. Patient emailed with information above.    Pt called in due to NP  being out on 12/19 she was able  to reschedule on 12/7.

## 2025-03-04 ENCOUNTER — TELEPHONE (OUTPATIENT)
Dept: SMOKING CESSATION | Facility: CLINIC | Age: 60
End: 2025-03-04
Payer: COMMERCIAL

## 2025-03-04 NOTE — TELEPHONE ENCOUNTER
Attempted to call patient to reschedule their smoking cessation appointment. Left a message with my contact information to reschedule the appointment. Audrey Mejia [phone].

## 2025-03-13 ENCOUNTER — TELEPHONE (OUTPATIENT)
Dept: CARDIOLOGY | Facility: HOSPITAL | Age: 60
End: 2025-03-13
Payer: COMMERCIAL

## 2025-03-13 NOTE — TELEPHONE ENCOUNTER
Received a phone call from Yoselyn with Dr. Negron office in [place] asking us to release pt to them in [facility] as they will start monitoring pt remotes.  I informed Yoselyn that I will have to speak with the pt first and if the pt is okay with the patient we will move her over. I informed her if the pt chooses to continue to have her device monitored with us then I would call her back at [phone] and let her know. I called pt to see if she wanted us to continue her remote monitoring or if she wanted us to move her monitoring over. No answer. I left a voice message for the pt to return my call. I left the direct number to the device clinic for the pt to return the call.      Pt returned phone call and stated she no longer would like us to monitor her remotes. Her remotes have been transferred to [facility].

## 2025-03-14 ENCOUNTER — PATIENT MESSAGE (OUTPATIENT)
Dept: FAMILY MEDICINE | Facility: CLINIC | Age: 60
End: 2025-03-14
Payer: COMMERCIAL

## 2025-03-20 ENCOUNTER — RESULTS FOLLOW-UP (OUTPATIENT)
Dept: FAMILY MEDICINE | Facility: CLINIC | Age: 60
End: 2025-03-20

## 2025-03-20 ENCOUNTER — PATIENT MESSAGE (OUTPATIENT)
Dept: FAMILY MEDICINE | Facility: CLINIC | Age: 60
End: 2025-03-20

## 2025-03-20 ENCOUNTER — OFFICE VISIT (OUTPATIENT)
Dept: FAMILY MEDICINE | Facility: CLINIC | Age: 60
End: 2025-03-20
Payer: COMMERCIAL

## 2025-03-20 VITALS
SYSTOLIC BLOOD PRESSURE: 137 MMHG | DIASTOLIC BLOOD PRESSURE: 89 MMHG | OXYGEN SATURATION: 96 % | RESPIRATION RATE: 18 BRPM | BODY MASS INDEX: 33.25 KG/M2 | HEIGHT: 62 IN | WEIGHT: 180.69 LBS | HEART RATE: 82 BPM | TEMPERATURE: 98 F

## 2025-03-20 DIAGNOSIS — J40 BRONCHITIS: ICD-10-CM

## 2025-03-20 DIAGNOSIS — J30.1 SEASONAL ALLERGIC RHINITIS DUE TO POLLEN: ICD-10-CM

## 2025-03-20 DIAGNOSIS — R23.3 EASY BRUISABILITY: ICD-10-CM

## 2025-03-20 DIAGNOSIS — R05.9 COUGH, UNSPECIFIED TYPE: Primary | ICD-10-CM

## 2025-03-20 DIAGNOSIS — Z72.0 TOBACCO USE: ICD-10-CM

## 2025-03-20 PROBLEM — G40.919 INTRACTABLE EPILEPSY WITHOUT STATUS EPILEPTICUS: Status: RESOLVED | Noted: 2024-01-10 | Resolved: 2025-03-20

## 2025-03-20 LAB
CTP QC/QA: YES
POC MOLECULAR INFLUENZA A AGN: NEGATIVE
POC MOLECULAR INFLUENZA B AGN: NEGATIVE
S PYO RRNA THROAT QL PROBE: NEGATIVE
SARS-COV-2 RDRP RESP QL NAA+PROBE: NEGATIVE

## 2025-03-20 PROCEDURE — 99999 PR PBB SHADOW E&M-EST. PATIENT-LVL IV: CPT | Mod: PBBFAC,,, | Performed by: STUDENT IN AN ORGANIZED HEALTH CARE EDUCATION/TRAINING PROGRAM

## 2025-03-20 RX ORDER — DEXAMETHASONE SODIUM PHOSPHATE 4 MG/ML
8 INJECTION, SOLUTION INTRA-ARTICULAR; INTRALESIONAL; INTRAMUSCULAR; INTRAVENOUS; SOFT TISSUE
Status: COMPLETED | OUTPATIENT
Start: 2025-03-20 | End: 2025-03-20

## 2025-03-20 RX ORDER — AZITHROMYCIN 250 MG/1
TABLET, FILM COATED ORAL
Qty: 6 TABLET | Refills: 0 | Status: SHIPPED | OUTPATIENT
Start: 2025-03-20

## 2025-03-20 RX ADMIN — DEXAMETHASONE SODIUM PHOSPHATE 8 MG: 4 INJECTION, SOLUTION INTRA-ARTICULAR; INTRALESIONAL; INTRAMUSCULAR; INTRAVENOUS; SOFT TISSUE at 11:03

## 2025-03-20 NOTE — PROGRESS NOTES
1. Cough, unspecified type  -     POCT COVID-19 Rapid Screening  -     POCT Influenza A/B Molecular  -     POCT Rapid Strep A    2. Seasonal allergic rhinitis due to pollen  -     dexAMETHasone injection 8 mg    3. Easy bruisability  -     Ambulatory referral/consult to Hematology / Oncology; Future; Expected date: 03/27/2025    4. Tobacco use  Overview:  Assistance with smoking cessation was offered, including:  [x]  Medications  [x]  Counseling  []  Printed Information on Smoking Cessation  [x]  Referral to a Smoking Cessation Program    Patient was counseled regarding smoking for 3-10 minutes.  Smoking about 1ppd, about 44 pack year smoking hx (previously smoked 2-3 ppd)  Reports she is working to [employer] down      5. Bronchitis  -     azithromycin (Z-TINO) 250 MG tablet; Take 2 tablets by mouth on day 1; Take 1 tablet by mouth on days 2-5  Dispense: 6 tablet; Refill: 0         Assessment & Plan    ALLERGIC RHINITIS DUE TO POLLEN:  - Assessed the patient's allergy symptoms, particularly noting sensitivity to pollen affecting the sinuses.  - Confirmed the patient's condition is due to allergies, considering the current pollination season.  - Administered a steroid injection in the office for allergy management.  - Noted that the patient is taking hydroxyzine for allergies but reports it's not effective.      CHRONIC BRONCHITIS:  - Noted the patient's chronic dry cough, likely related to bronchitis from sinus problems.  - Prescribed an antibiotic (non-penicillin, azithromycin) for potential bronchitis  - declines albuterol    WHEEZING:  - Detected wheezing during physical exam.  - dexa 8mg  - Detected wheezing on exam, indicating the need for antibiotic prescription as a precautionary measure against bronchitis infection, given the patient's smoking history.    SPONTANEOUS ECCHYMOSES:  - Reviewed the patient's concern about easy bruising and spontaneous bleeding.  - Noted that previous workup for easy bruising  was negative.  - Referred the patient to a hematologist for further evaluation of easy bruising.             Emily Dumont MD  _________________________________________________________________________      Patient ID: Vesna Richards is a 59 y.o. female.    History of Present Illness    CHIEF COMPLAINT:  Patient presents today for allergies.    ALLERGIES:  She reports significant sinus symptoms from pollen. Currently taking hydroxyzine which is not providing relief.    BLEEDING AND BRUISING:  She reports easy bruising and spontaneous bleeding, particularly affecting the arms. She describes spontaneous bleeding while sitting at a table and bruising from minimal contact such as putting on a shirt or reading in bed. She denies similar issues on her legs. Previous workup for easy bruising was negative. She denies use of anticoagulants or aspirin.      SOCIAL HISTORY:  She currently smokes one pack per day, reduced from previous 2.5-3 packs daily. She rarely goes outside. She frequently drinks water mixed with tea and sugar.          Past medical histories reviewed, including past medical, surgical, family and social histories.      Medications Ordered Prior to Encounter[1]    Review of Systems   12 point review of systems negative except for listed in HPI.     Objective:    Nursing note and vitals reviewed.  Vitals:    03/20/25 1031   BP: 137/89   Pulse: 82   Resp: 18   Temp: 97.9 °F (36.6 °C)     Body mass index is 33.05 kg/m².     Physical Exam   Constitutional: oriented to person, place, and time. well-developed and well-nourished. No distress.   HENT: WNL  Head: Normocephalic and atraumatic.   Eyes: EOM are normal.   Neck: Normal range of motion. Neck supple.   Cardiovascular: Normal rate  Pulmonary/Chest: Effort normal. No respiratory distress. Wheezing throughout  GI: soft, non distended, no ttp, no rebound/guarding  Musculoskeletal: Normal range of motion. no edema.   Neurological: CN II-XII intact  Skin: warm  and dry. Ecchymosis throughout upper extremities and hands   Psychiatric: normal mood and affect. behavior is normal.             We Offer Telehealth & Same Day Appointments!   Book your Telehealth appointment with me through my nurse or   Clinic appointments on [employer]!  [phone]     To Schedule appointments online, go to [email or web address]: [email or web address]       Visit today included increased complexity associated with the care of the episodic problem addressed and managing the longitudinal care of the patient due to the serious and/or complex managed problem(s) as per problem list.     This note was generated with the assistance of ambient listening technology. Verbal consent was obtained by the patient and accompanying visitor(s) for the recording of patient appointment to facilitate this note. I attest to having reviewed and edited the generated note for accuracy, though some syntax or spelling errors may persist. Please contact the author of this note for any clarification.              [1]   Current Outpatient Medications on File Prior to Visit   Medication Sig Dispense Refill    atorvastatin (LIPITOR) 40 MG tablet TAKE 1 TABLET BY MOUTH EVERY DAY 90 tablet 3    hydrOXYzine pamoate (VISTARIL) 100 MG capsule Take 1 capsule (100 mg total) by mouth 2 (two) times a day. 180 capsule 3    metoprolol succinate (TOPROL-XL) 25 MG 24 hr tablet Take 1 tablet by mouth once daily.      multivitamin with minerals tablet Take 1 tablet by mouth once daily.      vitamin D (VITAMIN D3) 1000 units Tab Take 5,000 Units by mouth once daily.       No current facility-administered medications on file prior to visit.

## 2025-03-22 DIAGNOSIS — E78.2 MIXED HYPERLIPIDEMIA: ICD-10-CM

## 2025-03-23 NOTE — TELEPHONE ENCOUNTER
Refill Routing Note   Medication(s) are not appropriate for processing by [facility] for the following reason(s):        Required labs outdated    [facility] action(s):  Defer     Requires labs : Yes             Appointments  past 12m or future 3m with PCP    Date Provider   Last Visit   3/20/2025 Emily Dumont MD   Next Visit   3/24/2025 Emily Dumont MD   ED visits in past 90 days: 0        Note composed:2:42 PM 03/23/2025

## 2025-03-24 RX ORDER — ATORVASTATIN CALCIUM 40 MG/1
40 TABLET, FILM COATED ORAL
Qty: 90 TABLET | Refills: 3 | Status: SHIPPED | OUTPATIENT
Start: 2025-03-24

## 2025-03-25 ENCOUNTER — OFFICE VISIT (OUTPATIENT)
Dept: FAMILY MEDICINE | Facility: CLINIC | Age: 60
End: 2025-03-25
Payer: COMMERCIAL

## 2025-03-25 VITALS
WEIGHT: 179.31 LBS | BODY MASS INDEX: 33 KG/M2 | RESPIRATION RATE: 18 BRPM | TEMPERATURE: 97 F | HEART RATE: 75 BPM | SYSTOLIC BLOOD PRESSURE: 114 MMHG | DIASTOLIC BLOOD PRESSURE: 68 MMHG | HEIGHT: 62 IN | OXYGEN SATURATION: 96 %

## 2025-03-25 DIAGNOSIS — F33.1 MODERATE EPISODE OF RECURRENT MAJOR DEPRESSIVE DISORDER: ICD-10-CM

## 2025-03-25 DIAGNOSIS — I44.2 COMPLETE AV BLOCK: ICD-10-CM

## 2025-03-25 DIAGNOSIS — R06.2 WHEEZING: ICD-10-CM

## 2025-03-25 DIAGNOSIS — F17.200 TOBACCO DEPENDENCE SYNDROME: ICD-10-CM

## 2025-03-25 DIAGNOSIS — F40.00 AGORAPHOBIA: ICD-10-CM

## 2025-03-25 DIAGNOSIS — J30.1 SEASONAL ALLERGIC RHINITIS DUE TO POLLEN: ICD-10-CM

## 2025-03-25 DIAGNOSIS — E78.2 MIXED HYPERLIPIDEMIA: Primary | ICD-10-CM

## 2025-03-25 PROCEDURE — 99999 PR PBB SHADOW E&M-EST. PATIENT-LVL IV: CPT | Mod: PBBFAC,,, | Performed by: STUDENT IN AN ORGANIZED HEALTH CARE EDUCATION/TRAINING PROGRAM

## 2025-03-25 RX ORDER — ALBUTEROL SULFATE 90 UG/1
2 INHALANT RESPIRATORY (INHALATION) EVERY 6 HOURS PRN
Qty: 18 G | Refills: 1 | Status: SHIPPED | OUTPATIENT
Start: 2025-03-25

## 2025-03-25 RX ORDER — MONTELUKAST SODIUM 10 MG/1
10 TABLET ORAL NIGHTLY
Qty: 30 TABLET | Refills: 11 | Status: SHIPPED | OUTPATIENT
Start: 2025-03-25 | End: 2025-04-24

## 2025-03-25 NOTE — PATIENT INSTRUCTIONS
Michael Malagon,     If you are due for any health screening(s) below please notify me so we can arrange them to be ordered and scheduled. Most healthy patients at your age complete them, but you are free to accept or refuse.     If you can't do it, I'll definitely understand. If you can, I'd certainly appreciate it!    Tests to Keep You Healthy    Mammogram: Met on 5/6/2024  Colon Cancer Screening: Met on 5/5/2024  Cervical Cancer Screening: DUE  Tobacco Cessation: NO      Your cervical cancer screening is due     Our records indicate that you may be overdue for your screening Pap smear. A Pap smear is an important health screening that can detect abnormal cells that can become cervical cancer. Cervical cancer screenings allow for early diagnosis and increase the likelihood of successful treatment.     The current recommendation for Pap smear screening is every 3-5 years for women at average risk. We encourage you to schedule your appointment with your [facility] health provider. Many women see a gynecologist for this screening, but some primary care providers also provide Pap screening.     If you recently had your Pap smear screening performed outside of [facility], please let your health care team know so that they can update your health record.      Were here to help you quit smoking     Our records indicated that you are still smoking. One of the best things you can do for your health is to stop smoking and we are here to help.     Talk with your provider about our Smoking Cessation Program and how we can support you on your journey.

## 2025-03-25 NOTE — PROGRESS NOTES
1. Mixed hyperlipidemia  Overview:  -chronic condition. Currently stable.    - lipids ordered   Hyperlipidemia Medications       atorvastatin (LIPITOR) 40 MG tablet Take 1 tablet (40 mg total) by mouth once daily.          2. High grade AV block  Overview:  S/p pacemaker 2024  Cont bb      3. Moderate episode of recurrent major depressive disorder  Overview:  Chronic hx; active, anxiety worse  Follows with psych      4. Agoraphobia  Overview:  Chronic hx; avoids large crowds and large buildings      5. Tobacco dependence syndrome  Overview:  Assistance with smoking cessation was offered, including:  [x]  Medications  [x]  Counseling  []  Printed Information on Smoking Cessation  [x]  Referral to a Smoking Cessation Program    Patient was counseled regarding smoking for 3-10 minutes.   Smoking about 1/2 - 1ppd      6. Seasonal allergic rhinitis due to pollen  -     montelukast (SINGULAIR) 10 mg tablet; Take 1 tablet (10 mg total) by mouth every evening.  Dispense: 30 tablet; Refill: 11    7. Wheezing  -     albuterol (PROVENTIL/VENTOLIN HFA) 90 mcg/actuation inhaler; Inhale 2 puffs into the lungs every 6 (six) hours as needed for Wheezing.  Dispense: 18 g; Refill: 1         ASTHMA AND ALLERGIES:  - Assessed the patient's presentation of wheezing and cough, likely due to allergies and possibly residual effects from recent illness.  - Detected wheezing during physical exam.  - Initiated Montelukast (Singulair) for allergy symptoms.  - Recommend using an inhaler for breathing difficulties.  - Explained that Mucinex can help with sinus-related cough.            Credit card paperwork completed     Emily Dumont MD  _________________________________________________________________________      Patient ID: Vesna Richards is a 59 y.o. female.    History of Present Illness    CHIEF COMPLAINT:  Patient presents today for follow up of respiratory symptoms    CARDIAC HISTORY:  She has a history of syncopal episodes and  seizures beginning 2.5 years ago, initially thought to be epileptic but later determined to be cardiac arrest episodes. She denies feeling the cardiac arrests when they occurred. A pacemaker was placed last year to address these episodes.    MENTAL HEALTH:  She has a history of agoraphobia, high anxiety, and depression. She reports significant difficulty with daily activities, including showering and staying out of her bedroom for extended periods. She expresses feeling extremely uneasy around people, describing a strong urge to flee in social situations. She is currently under the care of a  who is supervised by a psychiatrist.      SOCIAL HISTORY:  She currently smokes 0.5-1 pack of cigarettes daily, varying with stress level, decreased from previous 3 packs per day.          Past medical histories reviewed, including past medical, surgical, family and social histories.      Medications Ordered Prior to Encounter[1]    Review of Systems   12 point review of systems negative except for listed in HPI.     Objective:    Nursing note and vitals reviewed.  Vitals:    03/25/25 1036   BP: 114/68   Pulse: 75   Resp: 18   Temp: 97.4 °F (36.3 °C)     Body mass index is 32.79 kg/m².     Physical Exam   Constitutional: oriented to person, place, and time. well-developed and well-nourished. No distress.   HENT: WNL  Head: Normocephalic and atraumatic.   Eyes: EOM are normal.   Neck: Normal range of motion. Neck supple.   Cardiovascular: Normal rate  Pulmonary/Chest: Effort normal. No respiratory distress. RLL wheezing on end expiration  GI: soft, non distended, no ttp, no rebound/guarding  Musculoskeletal: Normal range of motion. no edema.   Neurological: CN II-XII intact  Skin: warm and dry.   Psychiatric: normal mood and affect. behavior is normal.                 We Offer Telehealth & Same Day Appointments!   Book your Telehealth appointment with me through my nurse or   Clinic appointments on  [employer]!  [phone]     To Schedule appointments online, go to [facility]: [email or web address]       Visit today included increased complexity associated with the care of the episodic problem addressed and managing the longitudinal care of the patient due to the serious and/or complex managed problem(s) as per problem list.     This note was generated with the assistance of ambient listening technology. Verbal consent was obtained by the patient and accompanying visitor(s) for the recording of patient appointment to facilitate this note. I attest to having reviewed and edited the generated note for accuracy, though some syntax or spelling errors may persist. Please contact the author of this note for any clarification.              [1]   Current Outpatient Medications on File Prior to Visit   Medication Sig Dispense Refill    atorvastatin (LIPITOR) 40 MG tablet TAKE 1 TABLET BY MOUTH EVERY DAY 90 tablet 3    hydrOXYzine pamoate (VISTARIL) 100 MG capsule Take 1 capsule (100 mg total) by mouth 2 (two) times a day. 180 capsule 3    metoprolol succinate (TOPROL-XL) 25 MG 24 hr tablet Take 1 tablet by mouth once daily.      multivitamin with minerals tablet Take 1 tablet by mouth once daily.      vitamin D (VITAMIN D3) 1000 units Tab Take 5,000 Units by mouth once daily.      [DISCONTINUED] azithromycin (Z-TINO) 250 MG tablet Take 2 tablets by mouth on day 1; Take 1 tablet by mouth on days 2-5 (Patient not taking: Reported on 3/25/2025) 6 tablet 0     No current facility-administered medications on file prior to visit.

## 2025-03-26 ENCOUNTER — TELEPHONE (OUTPATIENT)
Dept: SMOKING CESSATION | Facility: CLINIC | Age: 60
End: 2025-03-26
Payer: COMMERCIAL

## 2025-03-26 NOTE — TELEPHONE ENCOUNTER
Attempted to call patient for their smoking cessation appointment. Left a message with my contact information to reschedule the appointment. Audrey Mejia [phone].

## 2025-04-25 ENCOUNTER — PATIENT MESSAGE (OUTPATIENT)
Dept: NEUROLOGY | Facility: CLINIC | Age: 60
End: 2025-04-25
Payer: COMMERCIAL

## 2025-04-30 ENCOUNTER — OFFICE VISIT (OUTPATIENT)
Dept: HEMATOLOGY/ONCOLOGY | Facility: CLINIC | Age: 60
End: 2025-04-30
Payer: COMMERCIAL

## 2025-04-30 ENCOUNTER — LAB VISIT (OUTPATIENT)
Dept: LAB | Facility: HOSPITAL | Age: 60
End: 2025-04-30
Attending: NURSE PRACTITIONER
Payer: COMMERCIAL

## 2025-04-30 VITALS
DIASTOLIC BLOOD PRESSURE: 68 MMHG | WEIGHT: 176.63 LBS | SYSTOLIC BLOOD PRESSURE: 128 MMHG | BODY MASS INDEX: 32.5 KG/M2 | HEIGHT: 62 IN | HEART RATE: 68 BPM

## 2025-04-30 DIAGNOSIS — R07.89 CHEST WALL TENDERNESS: ICD-10-CM

## 2025-04-30 DIAGNOSIS — R05.3 CHRONIC COUGH: Primary | ICD-10-CM

## 2025-04-30 DIAGNOSIS — R23.3 EASY BRUISABILITY: ICD-10-CM

## 2025-04-30 DIAGNOSIS — R74.01 TRANSAMINITIS: ICD-10-CM

## 2025-04-30 DIAGNOSIS — F17.210 HEAVY CIGARETTE SMOKER: ICD-10-CM

## 2025-04-30 LAB
ABSOLUTE EOSINOPHIL (OHS): 0.07 K/UL
ABSOLUTE MONOCYTE (OHS): 0.71 K/UL (ref 0.3–1)
ABSOLUTE NEUTROPHIL COUNT (OHS): 6.24 K/UL (ref 1.8–7.7)
ALBUMIN SERPL BCP-MCNC: 3.4 G/DL (ref 3.5–5.2)
ALP SERPL-CCNC: 117 UNIT/L (ref 40–150)
ALT SERPL W/O P-5'-P-CCNC: 31 UNIT/L (ref 10–44)
ANION GAP (OHS): 9 MMOL/L (ref 8–16)
AST SERPL-CCNC: 18 UNIT/L (ref 11–45)
BASOPHILS # BLD AUTO: 0.07 K/UL
BASOPHILS NFR BLD AUTO: 0.7 %
BILIRUB SERPL-MCNC: 0.5 MG/DL (ref 0.1–1)
BUN SERPL-MCNC: 12 MG/DL (ref 6–20)
CALCIUM SERPL-MCNC: 9.5 MG/DL (ref 8.7–10.5)
CHLORIDE SERPL-SCNC: 105 MMOL/L (ref 95–110)
CO2 SERPL-SCNC: 28 MMOL/L (ref 23–29)
CREAT SERPL-MCNC: 1 MG/DL (ref 0.5–1.4)
ERYTHROCYTE [DISTWIDTH] IN BLOOD BY AUTOMATED COUNT: 14 % (ref 11.5–14.5)
FERRITIN SERPL-MCNC: 107 NG/ML (ref 20–300)
GFR SERPLBLD CREATININE-BSD FMLA CKD-EPI: >60 ML/MIN/1.73/M2
GLUCOSE SERPL-MCNC: 113 MG/DL (ref 70–110)
HCT VFR BLD AUTO: 47.8 % (ref 37–48.5)
HGB BLD-MCNC: 15.5 GM/DL (ref 12–16)
IMM GRANULOCYTES # BLD AUTO: 0.03 K/UL (ref 0–0.04)
IMM GRANULOCYTES NFR BLD AUTO: 0.3 % (ref 0–0.5)
IRON SATN MFR SERPL: 54 % (ref 20–50)
IRON SERPL-MCNC: 193 UG/DL (ref 30–160)
LYMPHOCYTES # BLD AUTO: 3.64 K/UL (ref 1–4.8)
MCH RBC QN AUTO: 28.5 PG (ref 27–31)
MCHC RBC AUTO-ENTMCNC: 32.4 G/DL (ref 32–36)
MCV RBC AUTO: 88 FL (ref 82–98)
NUCLEATED RBC (/100WBC) (OHS): 0 /100 WBC
PLATELET # BLD AUTO: 261 K/UL (ref 150–450)
PMV BLD AUTO: 9.5 FL (ref 9.2–12.9)
POTASSIUM SERPL-SCNC: 5 MMOL/L (ref 3.5–5.1)
PROT SERPL-MCNC: 6.9 GM/DL (ref 6–8.4)
RBC # BLD AUTO: 5.43 M/UL (ref 4–5.4)
RELATIVE EOSINOPHIL (OHS): 0.7 %
RELATIVE LYMPHOCYTE (OHS): 33.8 % (ref 18–48)
RELATIVE MONOCYTE (OHS): 6.6 % (ref 4–15)
RELATIVE NEUTROPHIL (OHS): 57.9 % (ref 38–73)
SODIUM SERPL-SCNC: 142 MMOL/L (ref 136–145)
TIBC SERPL-MCNC: 357 UG/DL (ref 250–450)
TRANSFERRIN SERPL-MCNC: 241 MG/DL (ref 200–375)
WBC # BLD AUTO: 10.76 K/UL (ref 3.9–12.7)

## 2025-04-30 PROCEDURE — 83540 ASSAY OF IRON: CPT

## 2025-04-30 PROCEDURE — 85025 COMPLETE CBC W/AUTO DIFF WBC: CPT

## 2025-04-30 PROCEDURE — 36415 COLL VENOUS BLD VENIPUNCTURE: CPT | Mod: PO

## 2025-04-30 PROCEDURE — G2211 COMPLEX E/M VISIT ADD ON: HCPCS | Mod: S$GLB,,, | Performed by: NURSE PRACTITIONER

## 2025-04-30 PROCEDURE — 80053 COMPREHEN METABOLIC PANEL: CPT

## 2025-04-30 PROCEDURE — 82728 ASSAY OF FERRITIN: CPT

## 2025-04-30 PROCEDURE — 99999 PR PBB SHADOW E&M-EST. PATIENT-LVL IV: CPT | Mod: PBBFAC,,, | Performed by: NURSE PRACTITIONER

## 2025-04-30 PROCEDURE — 99205 OFFICE O/P NEW HI 60 MIN: CPT | Mod: S$GLB,,, | Performed by: NURSE PRACTITIONER

## 2025-04-30 NOTE — PROGRESS NOTES
Subjective:      Patient ID: Vesna Richards is a 59 y.o. female.    Chief Complaint: easy bruising    HPI:  59-year-old female presents to the clinic as a new patient for further evaluation and recommendations.  Has been referred to the clinic for easy bruisability.  Referral placed by Emily Dumont MD (her pcp).  States that she has been having easy bruisability since she has had her pacemaker placed on 2024.      Mammogram is due in May 2025.  5054 Cologuard - negative  States that Dr. Dumont will do her PAP.  Has had a pap about 3 years ago - states was good.    Family hx of cancer:  Mother: lung cancer - smoker  Father: prostate cancer  Other family history unknown    Is a cigarette smoker currently - has slowed down.  Has smoked cigarettes for 46 years about 2 pack/day.     States that she has been getting steroid injection in shoulder every 3 to 6 months - she has had 2 so far.        She denies taking nsaids or blood thinners. Denies alcohol use or history of alcohol use.  Has not had illicit since the s.    I have reviewed all of the patient's relevant lab work available in the medical record and have utilized this in my evaluation and management recommendations today.        Social History[1]    Family History   Problem Relation Name Age of Onset    Diabetes Mother Abby Groverchestacy     Heart disease Mother Abby Mckeonen     Diabetes Father David Brecheen     Heart disease Father David Garcia     Ovarian cancer Sister      Ovarian cancer Sister      Diabetes Sister Ruthie Rita     Heart disease Sister Ruthie Rita     Diabetes Sister Leti Vargas        Past Surgical History:   Procedure Laterality Date    A-V CARDIAC PACEMAKER INSERTION N/A 2024    Procedure: INSERTION, CARDIAC PACEMAKER, DUAL CHAMBER;  Surgeon: Jovanny King MD;  Location: [facility] EP LAB;  Service: Cardiology;  Laterality: N/A;  CHB, dPPM w/ LB, MDT, ANES, MB, CVICU [id number]    BREAST BIOPSY       SECTION          Past Medical History:   Diagnosis Date    Intractable epilepsy without status epilepticus 01/10/2024    Seizures        Review of Systems   Constitutional:  Negative for appetite change, chills, diaphoresis, fatigue, fever and unexpected weight change.   HENT:  Negative for mouth sores.    Eyes:  Negative for visual disturbance.   Respiratory:  Negative for cough and shortness of breath.    Cardiovascular:  Positive for chest pain.   Gastrointestinal:  Negative for abdominal pain and diarrhea.   Endocrine: Negative.    Genitourinary:  Negative for frequency.   Musculoskeletal:  Negative for back pain.   Skin:  Negative for rash.   Allergic/Immunologic: Negative.    Neurological:  Positive for headaches.   Hematological:  Negative for adenopathy. Does not bruise/bleed easily.   Psychiatric/Behavioral:  The patient is nervous/anxious.           Medication List with Changes/Refills   Current Medications    ALBUTEROL (PROVENTIL/VENTOLIN HFA) 90 MCG/ACTUATION INHALER    Inhale 2 puffs into the lungs every 6 (six) hours as needed for Wheezing.    ATORVASTATIN (LIPITOR) 40 MG TABLET    TAKE 1 TABLET BY MOUTH EVERY DAY    HYDROXYZINE PAMOATE (VISTARIL) 100 MG CAPSULE    Take 1 capsule (100 mg total) by mouth 2 (two) times a day.    METOPROLOL SUCCINATE (TOPROL-XL) 25 MG 24 HR TABLET    Take 1 tablet by mouth once daily.    MULTIVITAMIN WITH MINERALS TABLET    Take 1 tablet by mouth once daily.    VITAMIN D (VITAMIN D3) 1000 UNITS TAB    Take 5,000 Units by mouth once daily.        Objective:     Vitals:    04/30/25 1106   BP: 128/68   Pulse: 68       Physical Exam  Vitals reviewed.   Constitutional:       Appearance: Normal appearance.   HENT:      Head: Normocephalic and atraumatic.      Right Ear: External ear normal.      Left Ear: External ear normal.   Cardiovascular:      Rate and Rhythm: Normal rate and regular rhythm.      Heart sounds: Normal heart sounds, S1 normal and S2 normal.   Pulmonary:      Effort:  Pulmonary effort is normal.      Breath sounds: Normal breath sounds.   Abdominal:      General: There is no distension.   Musculoskeletal:         General: Normal range of motion.      Cervical back: Normal range of motion.   Skin:     General: Skin is warm and dry.             Comments: Pacemaker superior aspect tender   Neurological:      General: No focal deficit present.      Mental Status: She is alert and oriented to person, place, and time.   Psychiatric:         Attention and Perception: Attention and perception normal.         Mood and Affect: Mood and affect normal.         Speech: Speech normal.         Behavior: Behavior normal. Behavior is cooperative.         Thought Content: Thought content normal.         Cognition and Memory: Cognition and memory normal.         Judgment: Judgment normal.         Assessment:     Problem List Items Addressed This Visit       Easy bruisability    Relevant Orders    CBC Auto Differential    Comprehensive Metabolic Panel    Ferritin    Iron and TIBC    US Abdomen Complete     Other Visit Diagnoses         Chronic cough    -  Primary    Relevant Orders    CT Chest Lung Screening Low Dose      Heavy cigarette smoker        Relevant Orders    CT Chest Lung Screening Low Dose      Transaminitis        Relevant Orders    CBC Auto Differential    Comprehensive Metabolic Panel    US Abdomen Complete      Chest wall tenderness        Relevant Orders    US Soft Tissue Head Neck            Lab Results   Component Value Date    WBC 9.10 02/13/2025    RBC 5.08 02/13/2025    HGB 14.9 02/13/2025    HCT 46.7 02/13/2025    MCV 92 02/13/2025    MCH 29.3 02/13/2025    MCHC 31.9 (L) 02/13/2025    RDW 14.1 02/13/2025     02/13/2025    MPV 10.8 02/13/2025    GRAN 5.4 02/13/2025    GRAN 59.2 02/13/2025    LYMPH 3.0 02/13/2025    LYMPH 32.9 02/13/2025    MONO 0.6 02/13/2025    MONO 6.4 02/13/2025    EOS 0.1 02/13/2025    BASO 0.07 02/13/2025    EOSINOPHIL 0.5 02/13/2025    BASOPHIL  "0.8 02/13/2025      Lab Results   Component Value Date     02/13/2025     02/13/2025    K 3.9 02/13/2025    K 3.9 02/13/2025     02/13/2025     02/13/2025    CO2 27 02/13/2025    CO2 27 02/13/2025    BUN 10 02/13/2025    BUN 10 02/13/2025    CREATININE 0.9 02/13/2025    CREATININE 0.9 02/13/2025    CALCIUM 9.1 02/13/2025    CALCIUM 9.1 02/13/2025    ANIONGAP 9 02/13/2025    ANIONGAP 9 02/13/2025    ESTGFRAFRICA >60 07/10/2022    EGFRNONAA >60 07/10/2022     Lab Results   Component Value Date    ALT 40 02/13/2025    AST 31 02/13/2025    ALKPHOS 105 02/13/2025    BILITOT 0.6 02/13/2025     No results found for: "IRON", "TRANSFERRIN", "TIBC", "FESATURATED", "FERRITIN"     Plan:   Chronic cough  -     CT Chest Lung Screening Low Dose; Future; Expected date: 04/30/2025    Easy bruisability  -     Ambulatory referral/consult to Hematology / Oncology  -     CBC Auto Differential; Future; Expected date: 04/30/2025  -     Comprehensive Metabolic Panel; Future; Expected date: 04/30/2025  -     Ferritin; Future; Expected date: 04/30/2025  -     Iron and TIBC; Future; Expected date: 04/30/2025  -     US Abdomen Complete; Future; Expected date: 04/30/2025    Heavy cigarette smoker  -     CT Chest Lung Screening Low Dose; Future; Expected date: 04/30/2025    Transaminitis  -     CBC Auto Differential; Future; Expected date: 04/30/2025  -     Comprehensive Metabolic Panel; Future; Expected date: 04/30/2025  -     US Abdomen Complete; Future; Expected date: 04/30/2025    Chest wall tenderness  -     US Soft Tissue Head Neck; Future; Expected date: 04/30/2025      Med Onc Chart Routing      Follow up with physician    Follow up with TOY . First available VV to review results   Infusion scheduling note   n/a   Injection scheduling note n/a   Labs   Scheduling:  Preferred lab: [facility]  Lab interval:  cbc, cmp, iron studies   Imaging   LDCT chest in [place], abdominal US in [place], left chestwall " tenderness   Pharmacy appointment No pharmacy appointment needed      Other referrals       No additional referrals needed  n/a          Total time spent on encounter: 60 minutes    Collaborating Provider:  Dr. Carol Pierson MD    Thank You,  Janis Matute NP  Benign Hematology         [1]   Social History  Socioeconomic History    Marital status:    Tobacco Use    Smoking status: Every Day     Current packs/day: 0.00     Average packs/day: 2.2 packs/day for 82.6 years (179.5 ttl pk-yrs)     Types: Cigarettes     Start date:      Last attempt to quit: 2024     Years since quittin.6    Smokeless tobacco: Never   Substance and Sexual Activity    Alcohol use: Not Currently    Drug use: Not Currently    Sexual activity: Yes     Partners: Male     Comment: Menopause     Social Drivers of Health     Financial Resource Strain: Low Risk  (2024)    Overall Financial Resource Strain (CARDIA)     Difficulty of Paying Living Expenses: Not very hard   Recent Concern: Financial Resource Strain - Medium Risk (2024)    Overall Financial Resource Strain (CARDIA)     Difficulty of Paying Living Expenses: Somewhat hard   Food Insecurity: Food Insecurity Present (2024)    Hunger Vital Sign     Worried About Running Out of Food in the Last Year: Sometimes true     Ran Out of Food in the Last Year: Sometimes true   Transportation Needs: No Transportation Needs (2024)    TRANSPORTATION NEEDS     Transportation : No   Physical Activity: Insufficiently Active (2024)    Exercise Vital Sign     Days of Exercise per Week: 2 days     Minutes of Exercise per Session: 20 min   Stress: Stress Concern Present (2024)    [employer] - Occupational Stress Questionnaire     Feeling of Stress : To some extent   Housing Stability: Unknown (2024)    Housing Stability Vital Sign     Unable to Pay for Housing in the Last Year: No     Homeless in the Last Year: No

## 2025-05-13 ENCOUNTER — PATIENT MESSAGE (OUTPATIENT)
Dept: PSYCHIATRY | Facility: CLINIC | Age: 60
End: 2025-05-13
Payer: COMMERCIAL

## 2025-05-13 NOTE — TELEPHONE ENCOUNTER
I called the patient.  She was pleased and satisfied with regards to an appointment being scheduled May 20.  No current urgency or emergency.  Functioning is intact, no thoughts of harm to self or others, no jordan or psychosis.

## 2025-05-16 ENCOUNTER — HOSPITAL ENCOUNTER (OUTPATIENT)
Dept: RADIOLOGY | Facility: HOSPITAL | Age: 60
Discharge: HOME | End: 2025-05-16
Attending: STUDENT IN AN ORGANIZED HEALTH CARE EDUCATION/TRAINING PROGRAM
Payer: COMMERCIAL

## 2025-05-16 DIAGNOSIS — Z12.31 ENCOUNTER FOR SCREENING MAMMOGRAM FOR MALIGNANT NEOPLASM OF BREAST: ICD-10-CM

## 2025-05-16 PROCEDURE — 77067 SCR MAMMO BI INCL CAD: CPT | Mod: 26,,, | Performed by: RADIOLOGY

## 2025-05-16 PROCEDURE — 77063 BREAST TOMOSYNTHESIS BI: CPT | Mod: 26,,, | Performed by: RADIOLOGY

## 2025-05-16 PROCEDURE — 77067 SCR MAMMO BI INCL CAD: CPT | Mod: TC,PO

## 2025-05-20 ENCOUNTER — OFFICE VISIT (OUTPATIENT)
Dept: PSYCHIATRY | Facility: CLINIC | Age: 60
End: 2025-05-20
Payer: COMMERCIAL

## 2025-05-20 VITALS — SYSTOLIC BLOOD PRESSURE: 98 MMHG | HEART RATE: 68 BPM | DIASTOLIC BLOOD PRESSURE: 59 MMHG

## 2025-05-20 DIAGNOSIS — F19.11 HISTORY OF SUBSTANCE ABUSE: ICD-10-CM

## 2025-05-20 DIAGNOSIS — F40.01 PANIC DISORDER WITH AGORAPHOBIA: ICD-10-CM

## 2025-05-20 DIAGNOSIS — F32.9 MAJOR DEPRESSIVE EPISODE: ICD-10-CM

## 2025-05-20 DIAGNOSIS — F43.10 PTSD (POST-TRAUMATIC STRESS DISORDER): Primary | ICD-10-CM

## 2025-05-20 DIAGNOSIS — F41.1 GENERALIZED ANXIETY DISORDER: ICD-10-CM

## 2025-05-20 PROCEDURE — 99999 PR PBB SHADOW E&M-EST. PATIENT-LVL II: CPT | Mod: PBBFAC,,, | Performed by: PSYCHIATRY & NEUROLOGY

## 2025-05-20 PROCEDURE — G2211 COMPLEX E/M VISIT ADD ON: HCPCS | Mod: S$GLB,,, | Performed by: PSYCHIATRY & NEUROLOGY

## 2025-05-20 PROCEDURE — 99215 OFFICE O/P EST HI 40 MIN: CPT | Mod: S$GLB,,, | Performed by: PSYCHIATRY & NEUROLOGY

## 2025-05-20 RX ORDER — SERTRALINE HYDROCHLORIDE 25 MG/1
25 TABLET, FILM COATED ORAL DAILY
Qty: 90 TABLET | Refills: 0 | Status: SHIPPED | OUTPATIENT
Start: 2025-05-20
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
## 2025-06-09 RX ORDER — MUPIROCIN 20 MG/G
OINTMENT TOPICAL
Qty: 22 G | Refills: 0 | Status: SHIPPED | OUTPATIENT
Start: 2025-06-09

## 2025-06-09 NOTE — TELEPHONE ENCOUNTER
Refill Routing Note   Medication(s) are not appropriate for processing by [facility] for the following reason(s):        Outside of protocol  No active prescription written by provider    [facility] action(s):  Route               Appointments  past 12m or future 3m with PCP    Date Provider   Last Visit   3/25/2025 Emily Dumont MD   Next Visit   8/13/2025 Emily Dumont MD   ED visits in past 90 days: 0        Note composed:2:13 PM 06/09/2025           The patient has been examined and the H&P has been reviewed:    I concur with the findings and no changes have occurred since H&P was written.    Surgery risks, benefits and alternative options discussed and understood by patient/family.    OR today for L URS/ laser litho.        There are no hospital problems to display for this patient.

## 2025-06-10 NOTE — PLAN OF CARE
SICU PLAN OF CARE    Dx: Complete AV block    Goals of Care: SBP <180, MAP >65, continuous EEG    Vital Signs (last 12 hours):   Temp:  [98.1 °F (36.7 °C)-98.6 °F (37 °C)]   Pulse:  []   Resp:  [16-45]   BP: (125-150)/()   SpO2:  [89 %-96 %]      Neuro: AAOx4, Follows commands , and Moves all extremities spontaneously     Cardiac: normal sinus rhythm, RBBB, TVP backup paced @40    Respiratory: Room Air    Urine Output: External Catheter  450 mL/shift    Diet: Regular     Labs/Accuchecks: daily labs, labs reviewed with MD    Skin:  No skin breakdown noted this shift. All wounds/incisions per documentation.  Patient turned q2h, bony prominences protected, and mattress inflated/working correctly.   Bradford Score: 23. If Bradford Score is 16 or less, complete 4EYES note each shift.    Shift Events:  Pt complained of chest pain. Stat EKG ordered and completed, MD came to bedside and reviewed results.  SL Nitroglycerin given x3 with symptoms resolving. Stat Troponin ordered and collected, results reviewed with MD. See flowsheet for further assessment/details.  Family updated on current condition/plan of care, questions answered, and emotional support provided.  MD updated on current condition, vitals, labs, and gtts.        Nurses Note -- 4 Eyes  8/25/2024   6:00 AM      Skin assessed during: Q Shift  [x] No Altered Skin Integrity Present    []Prevention Measures Documented  [] Yes- Altered Skin Integrity Present or Discovered   [] LDA Added if Not in Epic (Describe Wound)   [] New Altered Skin Integrity was Present on Admit and Documented in LDA   [] Wound Image Taken  Wound Care Consulted? No  Attending Nurse:  Jennifer Bustillo RN/Staff Member:  José         [facility]  Gastroenterology  [address] [place] [address] [address]  [place] [address]  Phone: [phone]  Fax:

## 2025-06-23 ENCOUNTER — PATIENT MESSAGE (OUTPATIENT)
Dept: PSYCHIATRY | Facility: CLINIC | Age: 60
End: 2025-06-23
Payer: COMMERCIAL

## 2025-07-18 ENCOUNTER — OFFICE VISIT (OUTPATIENT)
Dept: FAMILY MEDICINE | Facility: CLINIC | Age: 60
End: 2025-07-18
Payer: COMMERCIAL

## 2025-07-18 VITALS
TEMPERATURE: 97 F | HEIGHT: 62 IN | WEIGHT: 183 LBS | OXYGEN SATURATION: 97 % | SYSTOLIC BLOOD PRESSURE: 102 MMHG | DIASTOLIC BLOOD PRESSURE: 54 MMHG | HEART RATE: 74 BPM | BODY MASS INDEX: 33.68 KG/M2

## 2025-07-18 DIAGNOSIS — B35.1 TOENAIL FUNGUS: Primary | ICD-10-CM

## 2025-07-18 PROCEDURE — 99999 PR PBB SHADOW E&M-EST. PATIENT-LVL V: CPT | Mod: PBBFAC,,, | Performed by: NURSE PRACTITIONER

## 2025-07-18 RX ORDER — NITROGLYCERIN 0.4 MG/1
TABLET SUBLINGUAL
COMMUNITY

## 2025-07-18 RX ORDER — CICLOPIROX 80 MG/ML
SOLUTION TOPICAL NIGHTLY
Qty: 6.6 ML | Refills: 3 | Status: SHIPPED | OUTPATIENT
Start: 2025-07-18

## 2025-07-18 NOTE — PROGRESS NOTES
Subjective     Patient ID: Vesna Richards is a 59 y.o. female.    Chief Complaint: Nail Problem    Nail Problem  This is a chronic (Toenail fungus) problem. The current episode started more than 1 year ago. The problem occurs daily. The problem has been unchanged. Pertinent negatives include no abdominal pain, anorexia, arthralgias, change in bowel habit, chest pain, chills, congestion, coughing, diaphoresis, fatigue, fever, headaches, joint swelling, myalgias, nausea, neck pain, numbness, rash, sore throat, swollen glands, urinary symptoms, vertigo, visual change, vomiting or weakness. Nothing aggravates the symptoms. She has tried nothing for the symptoms. The treatment provided no relief.     Past Medical History:   Diagnosis Date    Intractable epilepsy without status epilepticus 01/10/2024    Seizures      Social History[1]  Past Surgical History:   Procedure Laterality Date    A-V CARDIAC PACEMAKER INSERTION N/A 2024    Procedure: INSERTION, CARDIAC PACEMAKER, DUAL CHAMBER;  Surgeon: Jovanny King MD;  Location: [facility];  Service: Cardiology;  Laterality: N/A;  [facility], dPPM w/ LB, MDT, LILI, MB, CVICU [id number]    BREAST BIOPSY       SECTION         Review of Systems   Constitutional: Negative.  Negative for chills, diaphoresis, fatigue and fever.   HENT: Negative.  Negative for nasal congestion and sore throat.    Eyes: Negative.    Respiratory: Negative.  Negative for cough.    Cardiovascular: Negative.  Negative for chest pain.   Gastrointestinal: Negative.  Negative for abdominal pain, anorexia, change in bowel habit, nausea and vomiting.   Endocrine: Negative.    Genitourinary: Negative.    Musculoskeletal: Negative.  Negative for arthralgias, joint swelling, myalgias and neck pain.   Integumentary:  Negative for rash.        Toenail fungus   Allergic/Immunologic: Negative.    Neurological: Negative.  Negative for vertigo, weakness, numbness and headaches.   Psychiatric/Behavioral:  Negative.            Objective     Physical Exam  Vitals and nursing note reviewed.   Constitutional:       Appearance: Normal appearance.   HENT:      Head: Normocephalic.      Right Ear: Tympanic membrane, ear canal and external ear normal.      Left Ear: Tympanic membrane, ear canal and external ear normal.      Nose: Nose normal.      Mouth/Throat:      Mouth: Mucous membranes are moist.      Pharynx: Oropharynx is clear.   Eyes:      Conjunctiva/sclera: Conjunctivae normal.      Pupils: Pupils are equal, round, and reactive to light.   Cardiovascular:      Rate and Rhythm: Normal rate and regular rhythm.      Pulses: Normal pulses.      Heart sounds: Normal heart sounds.   Pulmonary:      Effort: Pulmonary effort is normal.      Breath sounds: Normal breath sounds.   Abdominal:      General: Bowel sounds are normal.      Palpations: Abdomen is soft.   Musculoskeletal:         General: Normal range of motion.      Cervical back: Normal range of motion and neck supple.      Comments: Thick brown brittle toenails (toes 1 and 2 of left foot; toes 1 and 5 of right foot)   Skin:     General: Skin is warm and dry.      Capillary Refill: Capillary refill takes 2 to 3 seconds.   Neurological:      Mental Status: She is alert and oriented to person, place, and time.   Psychiatric:         Mood and Affect: Mood normal.         Behavior: Behavior normal.         Thought Content: Thought content normal.         Judgment: Judgment normal.            Assessment and Plan     1. Toenail fungus  -     Ambulatory referral/consult to Podiatry; Future; Expected date: 07/25/2025  -     ciclopirox (PENLAC) 8 % Soln; Apply topically nightly. X 8w  Dispense: 6.6 mL; Refill: 3  Hydrate well  Rest  Report to ER immediately if symptoms worsen or persist               No follow-ups on file.         [1]   Social History  Socioeconomic History    Marital status:    Tobacco Use    Smoking status: Every Day     Current packs/day: 0.00      Average packs/day: 2.2 packs/day for 82.6 years (179.5 ttl pk-yrs)     Types: Cigarettes     Start date:      Last attempt to quit: 2024     Years since quittin.9    Smokeless tobacco: Never   Substance and Sexual Activity    Alcohol use: Not Currently    Drug use: Not Currently    Sexual activity: Yes     Partners: Male     Comment: Menopause     Social Drivers of Health     Financial Resource Strain: Low Risk  (2024)    Overall Financial Resource Strain (CARDIA)     Difficulty of Paying Living Expenses: Not very hard   Recent Concern: Financial Resource Strain - Medium Risk (2024)    Overall Financial Resource Strain (CARDIA)     Difficulty of Paying Living Expenses: Somewhat hard   Food Insecurity: Food Insecurity Present (2024)    Hunger Vital Sign     Worried About Running Out of Food in the Last Year: Sometimes true     Ran Out of Food in the Last Year: Sometimes true   Transportation Needs: No Transportation Needs (2024)    TRANSPORTATION NEEDS     Transportation : No   Physical Activity: Insufficiently Active (2024)    Exercise Vital Sign     Days of Exercise per Week: 2 days     Minutes of Exercise per Session: 20 min   Stress: Stress Concern Present (2024)    [employer] [place] [employer] - Occupational Stress Questionnaire     Feeling of Stress : To some extent   Housing Stability: Unknown (2024)    Housing Stability Vital Sign     Unable to Pay for Housing in the Last Year: No     Homeless in the Last Year: No

## 2025-07-18 NOTE — PATIENT INSTRUCTIONS
Hydrate well  Rest  Report to ER immediately if symptoms worsen or persist    Hi Vesna,     If you are due for any health screening(s) below please notify me so we can arrange them to be ordered and scheduled. Most healthy patients at your age complete them, but you are free to accept or refuse.     If you can't do it, I'll definitely understand. If you can, I'd certainly appreciate it!    Tests to Keep You Healthy    Mammogram: Met on 5/16/2025  Colon Cancer Screening: Met on 5/5/2024  Cervical Cancer Screening: DUE  Tobacco Cessation: NO      Your cervical cancer screening is due     Our records indicate that you may be overdue for your screening Pap smear. A Pap smear is an important health screening that can detect abnormal cells that can become cervical cancer. Cervical cancer screenings allow for early diagnosis and increase the likelihood of successful treatment.     The current recommendation for Pap smear screening is every 3-5 years for women at average risk. We encourage you to schedule your appointment with your [facility] health provider. Many women see a gynecologist for this screening, but some primary care providers also provide Pap screening.     If you recently had your Pap smear screening performed outside of [facility], please let your health care team know so that they can update your health record.      Were here to help you quit smoking     Our records indicated that you are still smoking. One of the best things you can do for your health is to stop smoking and we are here to help.     Talk with your provider about our Smoking Cessation Program and how we can support you on your journey.                   Dorsal Nasal Flap Text: The defect edges were debeveled with a #15 scalpel blade.  Given the location of the defect and the proximity to free margins a dorsal nasal flap was deemed most appropriate.  Using a sterile surgical marker, an appropriate dorsal nasal flap was drawn around the defect.    The area thus outlined was incised deep to adipose tissue with a #15 scalpel blade.  The skin margins were undermined to an appropriate distance in all directions utilizing iris scissors.
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
## 2025-07-22 ENCOUNTER — OFFICE VISIT (OUTPATIENT)
Dept: PSYCHIATRY | Facility: CLINIC | Age: 60
End: 2025-07-22
Payer: COMMERCIAL

## 2025-07-22 VITALS — SYSTOLIC BLOOD PRESSURE: 124 MMHG | DIASTOLIC BLOOD PRESSURE: 77 MMHG | HEART RATE: 76 BPM

## 2025-07-22 DIAGNOSIS — F19.11 HISTORY OF SUBSTANCE ABUSE: ICD-10-CM

## 2025-07-22 DIAGNOSIS — F32.9 MAJOR DEPRESSIVE EPISODE: ICD-10-CM

## 2025-07-22 DIAGNOSIS — F43.10 PTSD (POST-TRAUMATIC STRESS DISORDER): ICD-10-CM

## 2025-07-22 DIAGNOSIS — F40.01 PANIC DISORDER WITH AGORAPHOBIA: Primary | ICD-10-CM

## 2025-07-22 DIAGNOSIS — F41.1 GENERALIZED ANXIETY DISORDER: ICD-10-CM

## 2025-07-22 PROCEDURE — G2211 COMPLEX E/M VISIT ADD ON: HCPCS | Mod: S$GLB,,, | Performed by: PSYCHIATRY & NEUROLOGY

## 2025-07-22 PROCEDURE — 99214 OFFICE O/P EST MOD 30 MIN: CPT | Mod: S$GLB,,, | Performed by: PSYCHIATRY & NEUROLOGY

## 2025-07-22 PROCEDURE — 99999 PR PBB SHADOW E&M-EST. PATIENT-LVL II: CPT | Mod: PBBFAC,,, | Performed by: PSYCHIATRY & NEUROLOGY

## 2025-07-22 PROCEDURE — 90833 PSYTX W PT W E/M 30 MIN: CPT | Mod: S$GLB,,, | Performed by: PSYCHIATRY & NEUROLOGY

## 2025-07-22 RX ORDER — SERTRALINE HYDROCHLORIDE 25 MG/1
25 TABLET, FILM COATED ORAL DAILY
Qty: 90 TABLET | Refills: 2 | Status: SHIPPED | OUTPATIENT
Start: 2025-07-22

## 2025-08-18 ENCOUNTER — OFFICE VISIT (OUTPATIENT)
Dept: FAMILY MEDICINE | Facility: CLINIC | Age: 60
End: 2025-08-18
Payer: COMMERCIAL

## 2025-08-18 VITALS
HEART RATE: 73 BPM | HEIGHT: 62 IN | DIASTOLIC BLOOD PRESSURE: 62 MMHG | BODY MASS INDEX: 33.29 KG/M2 | SYSTOLIC BLOOD PRESSURE: 110 MMHG | WEIGHT: 180.88 LBS | OXYGEN SATURATION: 95 %

## 2025-08-18 DIAGNOSIS — J30.9 ALLERGIC RHINITIS, UNSPECIFIED SEASONALITY, UNSPECIFIED TRIGGER: ICD-10-CM

## 2025-08-18 DIAGNOSIS — N39.0 URINARY TRACT INFECTION WITHOUT HEMATURIA, SITE UNSPECIFIED: Primary | ICD-10-CM

## 2025-08-18 LAB
BACTERIA #/AREA URNS HPF: ABNORMAL /HPF
BILIRUB UR QL STRIP.AUTO: NEGATIVE
CLARITY UR: ABNORMAL
COLOR UR AUTO: YELLOW
GLUCOSE UR QL STRIP: NEGATIVE
HGB UR QL STRIP: ABNORMAL
KETONES UR QL STRIP: NEGATIVE
LEUKOCYTE ESTERASE UR QL STRIP: ABNORMAL
MICROSCOPIC COMMENT: ABNORMAL
NITRITE UR QL STRIP: POSITIVE
PH UR STRIP: 6 [PH]
PROT UR QL STRIP: ABNORMAL
RBC #/AREA URNS HPF: 20 /HPF (ref 0–4)
SP GR UR STRIP: 1.01
WBC #/AREA URNS HPF: >100 /HPF (ref 0–5)

## 2025-08-18 PROCEDURE — 87186 SC STD MICRODIL/AGAR DIL: CPT | Performed by: NURSE PRACTITIONER

## 2025-08-18 PROCEDURE — G2211 COMPLEX E/M VISIT ADD ON: HCPCS | Mod: S$GLB,,, | Performed by: NURSE PRACTITIONER

## 2025-08-18 PROCEDURE — 99213 OFFICE O/P EST LOW 20 MIN: CPT | Mod: S$GLB,,, | Performed by: NURSE PRACTITIONER

## 2025-08-18 PROCEDURE — 99999 PR PBB SHADOW E&M-EST. PATIENT-LVL V: CPT | Mod: PBBFAC,,, | Performed by: NURSE PRACTITIONER

## 2025-08-18 PROCEDURE — 81000 URINALYSIS NONAUTO W/SCOPE: CPT | Mod: PO | Performed by: NURSE PRACTITIONER

## 2025-08-18 RX ORDER — MONTELUKAST SODIUM 10 MG/1
10 TABLET ORAL NIGHTLY
COMMUNITY
Start: 2025-06-26

## 2025-08-18 RX ORDER — NITROFURANTOIN 25; 75 MG/1; MG/1
100 CAPSULE ORAL 2 TIMES DAILY
Qty: 20 CAPSULE | Refills: 0 | Status: SHIPPED | OUTPATIENT
Start: 2025-08-18 | End: 2025-08-28

## 2025-08-19 LAB — HOLD SPECIMEN: NORMAL

## 2025-08-20 LAB — BACTERIA UR CULT: ABNORMAL

## (undated) DEVICE — SLITTER UNIVERSAL

## (undated) DEVICE — PAD DEFIB CADENCE ADULT R2

## (undated) DEVICE — COVER INSTR ELASTIC BAND 40X20

## (undated) DEVICE — INTRODUCER PRELUDESNAP 7F 13CM

## (undated) DEVICE — DRAPE INCISE IOBAN 2 23X17IN

## (undated) DEVICE — CATH ANGIO RIGHTSITE HIS 7X43

## (undated) DEVICE — ADHESIVE DERMABOND ADVANCED

## (undated) DEVICE — DRESSING AQUACEL FOAM 5 X 5

## (undated) DEVICE — ELECTRODE REM PLYHSV RETURN 9

## (undated) DEVICE — PACK PACER PERMANENT OMC

## (undated) DEVICE — SLING SWATHE UNIVERSAL FOAM

## (undated) DEVICE — TOWEL OR DISP STRL BLUE 4/PK

## (undated) DEVICE — GUIDEWIRE EMERALD 150CM PTFE